# Patient Record
Sex: FEMALE | Race: WHITE | Employment: OTHER | URBAN - METROPOLITAN AREA
[De-identification: names, ages, dates, MRNs, and addresses within clinical notes are randomized per-mention and may not be internally consistent; named-entity substitution may affect disease eponyms.]

---

## 2017-01-23 ENCOUNTER — TELEPHONE (OUTPATIENT)
Dept: INTERNAL MEDICINE CLINIC | Age: 77
End: 2017-01-23

## 2017-01-23 NOTE — TELEPHONE ENCOUNTER
Patient would like to discuss her blood sugars with . For the past 3-4 weeks she has noticed that her fasting blood sugar has been ranging from 165-212. She is currently taking glipizide and metformin. She doesn't feel that they are working. She wants to know if the dosage needs to change or does she need to try a new medication. Also she has tendonitis in both of her wrists and had cortisone shots in both wrists. She isn't sure if this could have anything to do with her elevated blood sugar. Also she is taking cymbalta and noticed that one of the side effects of taking it was problems with blood sugar. She would like to stop the cymbalta.  She would like a call back at [phone]

## 2017-01-24 NOTE — TELEPHONE ENCOUNTER
Spoke to pt - she states she would like to speak with Dr. Isaías Aguilera regarding her recent concerns with her glucose, her cortisone injections, and stopping her Cymbalta. Advised pt that it would be best if she come in for an office visit to further address her concerns. Pt is scheduled for Jan 26th @ 11am. Requested that she bring her recent blood sugar readings with her. Ms. La Nena Ornelas acknowledged understanding and all questions were answered to patients satisfaction. No further questions or concerns at this time.

## 2017-01-26 ENCOUNTER — OFFICE VISIT (OUTPATIENT)
Dept: INTERNAL MEDICINE CLINIC | Age: 77
End: 2017-01-26

## 2017-01-26 ENCOUNTER — HOSPITAL ENCOUNTER (OUTPATIENT)
Dept: LAB | Age: 77
Discharge: HOME | End: 2017-01-26
Payer: MEDICARE

## 2017-01-26 VITALS
WEIGHT: 172.9 LBS | HEIGHT: 59 IN | SYSTOLIC BLOOD PRESSURE: 130 MMHG | HEART RATE: 86 BPM | OXYGEN SATURATION: 97 % | BODY MASS INDEX: 34.86 KG/M2 | TEMPERATURE: 99.3 F | DIASTOLIC BLOOD PRESSURE: 80 MMHG | RESPIRATION RATE: 18 BRPM

## 2017-01-26 DIAGNOSIS — M15.9 OSTEOARTHRITIS INVOLVING MULTIPLE JOINTS ON BOTH SIDES OF BODY: ICD-10-CM

## 2017-01-26 DIAGNOSIS — E11.9 CONTROLLED TYPE 2 DIABETES MELLITUS WITHOUT COMPLICATION, WITHOUT LONG-TERM CURRENT USE OF INSULIN (HCC): Primary | ICD-10-CM

## 2017-01-26 DIAGNOSIS — Z23 NEED FOR PNEUMOCOCCAL VACCINATION: ICD-10-CM

## 2017-01-26 LAB
ALBUMIN UR QL STRIP: 30 MG/L
CREATININE, URINE POC: 200 MG/DL
MICROALBUMIN/CREAT RATIO POC: <30 MG/G

## 2017-01-26 PROCEDURE — 36415 COLL VENOUS BLD VENIPUNCTURE: CPT

## 2017-01-26 PROCEDURE — 80053 COMPREHEN METABOLIC PANEL: CPT

## 2017-01-26 PROCEDURE — 83036 HEMOGLOBIN GLYCOSYLATED A1C: CPT

## 2017-01-26 PROCEDURE — 85027 COMPLETE CBC AUTOMATED: CPT

## 2017-01-26 RX ORDER — AMLODIPINE BESYLATE 2.5 MG/1
2.5 TABLET ORAL DAILY
COMMUNITY
End: 2018-09-12 | Stop reason: DRUGHIGH

## 2017-01-26 RX ORDER — GLIPIZIDE 5 MG/1
5 TABLET ORAL 2 TIMES DAILY
Qty: 180 TAB | Refills: 1 | Status: SHIPPED | OUTPATIENT
Start: 2017-01-26 | End: 2017-02-15 | Stop reason: SDUPTHER

## 2017-01-26 RX ORDER — METFORMIN HYDROCHLORIDE 500 MG/1
500 TABLET ORAL 2 TIMES DAILY WITH MEALS
Qty: 180 TAB | Refills: 1 | Status: SHIPPED | OUTPATIENT
Start: 2017-01-26 | End: 2017-07-28 | Stop reason: SDUPTHER

## 2017-01-26 NOTE — PROGRESS NOTES
HPI:  Narayan Gonzalez is a 64y.o. year old female who returns to clinic today for an acute visit to discuss the problem(s) below:    She is here to discuss her diabetes. Moved her appt up early in order due to elevated blood sugar readings. She is having a lot of pain in her forearms due to tendonitis and had cortisone injections bilaterally about 6 weeks ago. Otherwise she reports no change in diet. She does [employer] for exercise. AM fasting:  Past week 165-212. Has not had hypoglycemia. Last A1c was 6.9 % on 10/25    Can't tolerate a higher metformin dose due to GI side effects. Sotalol was stopped by her cardiologist, there has been some improvement in fatigue. On 11/16 I started her on Cymbalta due to chronic msk pain and anxiety (she denies feeling anxious but was on lorazpem by Dr. Kei Alexander). She reports no improvement in pain level and feels her mood is ok aside from being worried about her health. She would overall like to be on less medication and requests to stop this. Prior to Admission medications    Medication Sig Start Date End Date Taking? Authorizing Provider   amLODIPine (NORVASC) 2.5 mg tablet Take  by mouth daily. Pt unsure of dosage   Yes Historical Provider   DULoxetine (CYMBALTA) 20 mg capsule Take 1 Cap by mouth daily. Indications: CHRONIC MUSCULOSKELETAL PAIN 11/16/16  Yes Olga Lidia Faust MD   glucose blood VI test strips (ONETOUCH ULTRA TEST) strip TEST BLOOD SUGAR ONCE DAILY DX Code: E11.9 11/16/16  Yes Olga Lidia Faust MD   metFORMIN (GLUCOPHAGE) 500 mg tablet Take 1 Tab by mouth two (2) times daily (with meals). 7/20/16  Yes Olga Lidia Faust MD   glipiZIDE (GLUCOTROL) 5 mg tablet TAKE ONE-HALF TABLET BY MOUTH TWICE A DAY WITH FOOD 5/25/16  Yes Olga Lidia Faust MD   cyanocobalamin (VITAMIN B-12) 500 mcg tablet Take 500 mcg by mouth daily. Yes Historical Provider   finasteride (PROSCAR) 5 mg tablet Take 5 mg by mouth daily.    Yes Historical Provider   biotin 2,500 mcg tab Take 5,000 Units by mouth. Yes Historical Provider   Cholecalciferol, Vitamin D3, (VITAMIN D3) 1,000 unit cap Take  by mouth. Yes Historical Provider   MULTIVITAMIN PO Take  by mouth. Yes Historical Provider   NAPROXEN SODIUM (ALEVE PO) Take  by mouth daily. Yes Historical Provider   aspirin delayed-release 81 mg tablet Take  by mouth daily. Every other day   Yes Historical Provider   LORazepam (ATIVAN) 0.5 mg tablet Take 0.5 mg by mouth daily as needed. Yes Historical Provider   diclofenac (VOLTAREN) 1 % gel APPLY 2GM TO AFFECTED AREA FOUR TIMES A DAY 8/5/16   Historical Provider   traMADol (ULTRAM) 50 mg tablet TAKE ONE TABLET BY MOUTH EVERY 6 HOURS AS NEEDED FOR PAIN 10/5/16   Historical Provider          Allergies   Allergen Reactions    Januvia [Sitagliptin] Rash    B12 [Cyanocobalamin-Cobamamide] Hives     w/injection    Decongestant [Brompheniramine Maleate] Other (comments)     heart           Review of Systems   Constitutional: Positive for malaise/fatigue. Negative for weight loss. Eyes: Negative for blurred vision. Respiratory: Negative for shortness of breath. Cardiovascular: Negative for chest pain and palpitations. She has pain in her anterior neck with activity   Gastrointestinal: Negative for abdominal pain, constipation and diarrhea. Genitourinary: Negative for frequency. Neurological: Negative for tingling. Endo/Heme/Allergies: Negative for polydipsia. Physical Exam   Constitutional: She appears well-nourished. Obese   Neck: Carotid bruit is not present. Cardiovascular: Normal rate, regular rhythm and normal heart sounds. No murmur heard. Pulses:       Carotid pulses are 2+ on the right side, and 2+ on the left side. Pulmonary/Chest: Effort normal and breath sounds normal.   Abdominal: Soft. Bowel sounds are normal. There is no hepatosplenomegaly. There is no tenderness. Musculoskeletal: She exhibits no edema.          Visit Vitals  /80    Pulse 86    Temp 99.3 °F (37.4 °C) (Oral)    Resp 18    Ht 4' 11\" (1.499 m)    Wt 172 lb 14.4 oz (78.4 kg)    SpO2 97%    BMI 34.92 kg/m2         Assessment & Plan:  Lesly Escalante was seen today for medication evaluation and diabetes. Diagnoses and all orders for this visit:    Controlled type 2 diabetes mellitus without complication, without long-term current use of insulin ([address]  Most recent blood sugars elevated without clear etiology - likely some worsening due to her inflammatory condition in her wrists and the need for steroid injections. I'm not sure how well she will do with introduction of new medicines, so prefer to work with what she is on. Will continue Metformin at present dose (can't tolerate higher dose)  Start with increase in morning glipizide to 5 mg, keep evening glipizide at 2.5 mg for now  If am glucose remains > 180 after 5 days increase evening glipizide to 5  We discussed the potential for hypoglycemia with glipizide so this will need to be carefully monitored. Check diabetic labs today    -     metFORMIN (GLUCOPHAGE) 500 mg tablet; Take 1 Tab by mouth two (2) times daily (with meals). -     glipiZIDE (GLUCOTROL) 5 mg tablet; Take 1 Tab by mouth two (2) times a day. 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 Need for pneumococcal vaccination  -     ADMIN PNEUMOCOCCAL VACCINE  -     PNEUMOCOCCAL POLYSACCHARIDE VACCINE, 23-VALENT, ADULT OR IMMUNOSUPPRESSED PT DOSE,         Follow-up Disposition:  Return for Keep Feb appt. Advised her to call back or return to office if symptoms worsen/change/persist.  Discussed expected course/resolution/complications of diagnosis in detail with patient.     Medication risks/benefits/costs/interactions/alternatives discussed with patient. She was given an after visit summary which includes diagnoses, current medications, & vitals. She expressed understanding with the diagnosis and plan.

## 2017-01-26 NOTE — MR AVS SNAPSHOT
Visit Information Date & Time Provider Department Dept. Phone Encounter #  
 1/26/2017 11:00 AM MD [facility] [phone] [id number] Follow-up Instructions Return for Keep Feb appt. Your Appointments 2/15/2017  1:40 PM  
ROUTINE CARE with MD [facility] ([facility]) Appt Note: 3m diabetic fu  
 [address] , [place] [address] [facility] [address]  
[address] [facility] [address]  
  
    
 9/25/2017 11:40 AM  
Any with Sydnee Montoya MD  
[address] [place] [address] ([facility]) Appt Note: YRLY CPAP FOLLOW UP  
 [address] [facility] [address] [facility] [address]  
  
   
 [address] [facility] [address] [facility] [id number] Upcoming Health Maintenance Date Due  
 EYE EXAM RETINAL OR DILATED Q1 4/21/2016 MEDICARE YEARLY EXAM 6/5/2016 Pneumococcal 65+ Low/Medium Risk (2 of 2 - PPSV23) 10/31/2016 MICROALBUMIN Q1 1/28/2017 GLAUCOMA SCREENING Q2Y 4/21/2017 HEMOGLOBIN A1C Q6M 4/25/2017 LIPID PANEL Q1 7/21/2017 FOOT EXAM Q1 11/16/2017 DTaP/Tdap/Td series (2 - Td) 9/14/2025 Allergies as of 1/26/2017  Review Complete On: 1/26/2017 By: Iman Barnett, LPN Severity Noted Reaction Type Reactions Januvia [Sitagliptin] Medium 07/23/2014   Side Effect Rash  
 B12 [Cyanocobalamin-cobamamide]  12/19/2011    Hives  
 w/injection Decongestant [Brompheniramine Maleate]  12/19/2011    Other (comments)  
 heart Current Immunizations  Reviewed on 1/26/2017 Name Date Influenza High Dose Vaccine PF 10/15/2016, 10/31/2015 Influenza Vaccine 10/15/2014, 10/16/2013 Influenza Vaccine Whole 10/8/2010 Pneumococcal Conjugate (PCV-13) 10/31/2015 Pneumococcal Polysaccharide (PPSV-23)  Incomplete Tdap 9/14/2015 Zoster 12/1/2009 Reviewed by [id number], MD on 1/26/2017 at 12:15 PM  
 Reviewed by [id number], MD on 1/26/2017 at 12:15 PM  
 Reviewed by [id number], MD on 1/26/2017 at 12:16 PM  
You Were Diagnosed With   
  
 Codes Comments Controlled type 2 diabetes mellitus without complication, without long-term current use of insulin ([facility]    -  Primary ICD-10-CM: E11.9 ICD-9-CM: 250.00 Need for pneumococcal vaccination     ICD-10-CM: N40 ICD-9-CM: V03.82 Vitals BP Pulse Temp Resp Height(growth percentile) Weight(growth percentile) 130/80 86 99.3 °F (37.4 °C) (Oral) 18 4' 11\" (1.499 m) 172 lb 14.4 oz (78.4 kg) SpO2 BMI OB Status Smoking Status 97% 34.92 kg/m2 Postmenopausal Never Smoker Vitals History BMI and BSA Data Body Mass Index Body Surface Area 34.92 kg/m 2 1.81 m 2 Preferred Pharmacy Pharmacy Name Phone [facility] [address] [phone] Your Updated Medication List  
  
   
This list is accurate as of: 1/26/17 12:25 PM.  Always use your most recent med list.  
  
  
  
  
 Ottis Sharath Take  by mouth daily. amLODIPine 2.5 mg tablet Commonly known as:  Tad Kaiden Take  by mouth daily. Pt unsure of dosage  
  
 aspirin delayed-release 81 mg tablet Take  by mouth daily. Every other day  
  
 biotin 2,500 mcg Tab Take 5,000 Units by mouth. diclofenac 1 % Gel Commonly known as:  VOLTAREN  
APPLY 2GM TO AFFECTED AREA FOUR TIMES A DAY DULoxetine 20 mg capsule Commonly known as:  CYMBALTA Take 1 Cap by mouth daily. Indications: CHRONIC MUSCULOSKELETAL PAIN  
  
 finasteride 5 mg tablet Commonly known as:  PROSCAR Take 5 mg by mouth daily. glipiZIDE 5 mg tablet Commonly known as:  Riri Joanna Take 1 Tab by mouth two (2) times a day. glucose blood VI test strips strip Commonly known as:  ONETOUCH ULTRA TEST  
TEST BLOOD SUGAR ONCE DAILY DX Code: E11.9 LORazepam 0.5 mg tablet Commonly known as:  ATIVAN Take 0.5 mg by mouth daily as needed. metFORMIN 500 mg tablet Commonly known as:  GLUCOPHAGE Take 1 Tab by mouth two (2) times daily (with meals). MULTIVITAMIN PO Take  by mouth. traMADol 50 mg tablet Commonly known as:  ULTRAM  
TAKE ONE TABLET BY MOUTH EVERY 6 HOURS AS NEEDED FOR PAIN  
  
 VITAMIN B-12 500 mcg tablet Generic drug:  cyanocobalamin Take 500 mcg by mouth daily. VITAMIN D3 1,000 unit Cap Generic drug:  cholecalciferol Take  by mouth. Prescriptions Sent to Pharmacy Refills  
 metFORMIN (GLUCOPHAGE) 500 mg tablet 1 Sig: Take 1 Tab by mouth two (2) times daily (with meals). Class: Normal  
 Pharmacy: [facility] [address] [facility] Ph #: [phone] Route: Oral  
 glipiZIDE (GLUCOTROL) 5 mg tablet 1 Sig: Take 1 Tab by mouth two (2) times a day. Class: Normal  
 Pharmacy: [facility]. [address] [facility] Ph #: [phone] Route: Oral  
  
We Performed the Following ADMIN PNEUMOCOCCAL VACCINE [ HCPCS] AMB POC URINE, MICROALBUMIN, SEMIQUANT (3 RESULTS) [10377 CPT(R)] CBC W/O DIFF [17805 CPT(R)] HEMOGLOBIN A1C WITH EAG [94521 CPT(R)] METABOLIC PANEL, COMPREHENSIVE [91103 CPT(R)] PNEUMOCOCCAL POLYSACCHARIDE VACCINE, 23-VALENT, ADULT OR IMMUNOSUPPRESSED PT DOSE, [79113 CPT(R)] Follow-up Instructions Return for Keep Feb appt. Patient Instructions Pneumococcal Polysaccharide Vaccine: What You Need to Know Pneumococcal disease Pneumococcal disease is caused by Streptococcus pneumoniae bacteria. It is a leading cause of vaccine-preventable illness and death in the [place]. Anyone can get pneumococcal disease, but some people are at greater risk than others: 
· People 65 years and older · The very young · People with certain health problems · People with a weakened immune system · Smokers Pneumococcal disease can lead to serious infections of the: 
· Lungs (pneumonia). · Blood (bacteremia). · Covering of the brain (meningitis). Pneumococcal pneumonia kills about 1 out of 20 people who get it. Bacteremia kills about 1 person in 5, and meningitis about 3 people in 10. People with the health problems described in Section 3 of this statement may be more likely to die from the disease. Pneumococcal polysaccharide vaccine (PPSV) Treatment of pneumococcal infections with penicillin and other drugs used to be more effective. But some strains of the disease have become resistant to these drugs. This makes prevention of the disease, through vaccination, even more important. Pneumococcal polysaccharide vaccine (PPSV) protects against 23 types of pneumococcal bacteria, including those most likely to cause serious disease. Most healthy adults who get the vaccine develop protection to most or all of these types within 2 to 3 weeks of getting the shot. Very old people, children under 3years of age, and people with some long-term illnesses might not respond as well, or at all. Another type of pneumococcal vaccine (pneumococcal conjugate vaccine, or PCV) is routinely recommended for children younger than 11years of age. PCV is described in a separate Vaccine Information Statement. Who should get PPSV? · All adults 72years of age and older. · Anyone 2 through 59years of age who has a long-term health problem such as: 
¨ Heart disease. ¨ Lung disease. ¨ Sickle cell disease. ¨ Diabetes. ¨ Alcoholism. ¨ Cirrhosis. ¨ Leaks of cerebrospinal fluid or cochlear implant. · Anyone 2 through 59years of age who has a disease or condition that lowers the body's resistance to infection, such as: 
¨ Hodgkin's disease. ¨ Lymphoma or leukemia. ¨ Kidney failure. ¨ Multiple myeloma. ¨ Nephrotic syndrome. ¨ HIV infection or AIDS. ¨ Damaged spleen, or no spleen. ¨ Organ transplant. · Anyone 2 through 59years of age who is taking a drug or treatment that lowers the body's resistance to infection, such as: 
¨ Long-term steroids. ¨ Certain cancer drugs. ¨ Radiation therapy. · Any adult 23 through 59years of age who: ¨ Is a smoker. ¨ Has asthma. PPSV may be less effective for some people, especially those with lower resistance to infection. But these people should still be vaccinated, because they are more likely to have serious complications if they get pneumococcal disease. Children who often get ear infections, sinus infections, or other upper respiratory diseases, but who are otherwise healthy, do not need to get PPSV because it is not effective against those conditions. How many doses of PPSV are needed, and when? Usually only one dose of PPSV is needed, but under some circumstances a second dose may be given. · A second dose is recommended for people 65 years and older who got their first dose when they were younger than 72 and it has been 5 or more years since the first dose. · A second dose is recommended for people 2 through 59years of age who: 
¨ Have a damaged spleen or no spleen. ¨ Have sickle-cell disease. ¨ Have HIV infection or AIDS. ¨ Have cancer, leukemia, lymphoma, multiple myeloma. ¨ Have nephrotic syndrome. ¨ Have had an organ or bone marrow transplant. ¨ Are taking medication that lowers immunity (such as chemotherapy or long-term steroids). When a second dose is given, it should be given 5 years after the first dose. Some people should not get PPSV or should wait · Anyone who has had a life-threatening allergic reaction to PPSV should not get another dose. · Anyone who has a severe allergy to any component of a vaccine should not get that vaccine. Tell your doctor if you have any severe allergies.  
· Anyone who is moderately or severely ill when the shot is scheduled may be asked to wait until they recover before getting the vaccine. Someone with a mild illness can usually be vaccinated. · While there is no evidence that PPSV is harmful to either a pregnant woman or to her fetus, as a precaution, women with conditions that put them at risk for pneumococcal disease should be vaccinated before becoming pregnant, if possible. What are the risks from PPSV? About half of people who get PPSV have mild side effects, such as redness or pain where the shot is given. Less than 1% develop a fever, muscle aches, or more severe local reactions. A vaccine, like any medicine, could cause a serious reaction. But the risk of a vaccine causing serious harm, or death, is extremely small. What if there is a serious reaction? What should I look for? · Look for anything that concerns you, such as signs of a severe allergic reaction, very high fever, or behavior changes. Signs of a severe allergic reaction can include hives, swelling of the face and throat, difficulty breathing, a fast heartbeat, dizziness, and weakness. These would start a few minutes to a few hours after the vaccination. What should I do? · If you think it is a severe allergic reaction or other emergency that can't wait, call 9-1-1 or get the person to the nearest hospital. Otherwise, call your doctor. · Afterward, the reaction should be reported to the Vaccine Adverse Event Reporting System (VAERS). Your doctor might file this report, or you can do it yourself through the VAERS web site at www.vaers. hhs.gov, or by calling 1-969.251.4608. VAERS is only for reporting reactions. They do not give medical advice. How can I learn more? · Ask your doctor. · Call your local or state health department. · Contact the Centers for Disease Control and Prevention (CDC): 
¨ Call 9-681.772.4170 (5-402-EFX-INFO) or ¨ Visit CDC's website at www.cdc.gov/vaccines Vaccine Information Statement PPSV Vaccine 
(10/06/2009) Department of Health and [employer] Centers for Disease Control and Prevention Many Vaccine Information Statements are available in [place] and other languages. See www.immunize.org/vis. Muchas hojas de información sobre vacunas están disponibles en español y en otros idiomas. Visite www.immunize.org/vis. Care instructions adapted under license by your healthcare professional. If you have questions about a medical condition or this instruction, always ask your healthcare professional. [address] any warranty or liability for your use of this information. Introducing [facility]! Dear Lacey De Leon: Thank you for requesting a [employer] account. Our records indicate that you already have an active [employer] account. You can access your account anytime at [email or web address] Did you know that you can access your hospital and ER discharge instructions at any time in [employer]? You can also review all of your test results from your hospital stay or ER visit. Additional Information If you have questions, please visit the Frequently Asked Questions section of the [employer] website at [email or web address] Remember, [employer] is NOT to be used for urgent needs. For medical emergencies, dial 911. Now available from your iPhone and Android! Please provide this summary of care documentation to your next provider. Your primary care clinician is listed as [address] If you have any questions after today's visit, please call [phone].

## 2017-01-26 NOTE — PATIENT INSTRUCTIONS
Pneumococcal Polysaccharide Vaccine: What You Need to Know  Pneumococcal disease  Pneumococcal disease is caused by Streptococcus pneumoniae bacteria. It is a leading cause of vaccine-preventable illness and death in the [place]. Anyone can get pneumococcal disease, but some people are at greater risk than others:  · People 65 years and older  · The very young  · People with certain health problems  · People with a weakened immune system  · Smokers  Pneumococcal disease can lead to serious infections of the:  · Lungs (pneumonia). · Blood (bacteremia). · Covering of the brain (meningitis). Pneumococcal pneumonia kills about 1 out of 20 people who get it. Bacteremia kills about 1 person in 5, and meningitis about 3 people in 10. People with the health problems described in Section 3 of this statement may be more likely to die from the disease. Pneumococcal polysaccharide vaccine (PPSV)  Treatment of pneumococcal infections with penicillin and other drugs used to be more effective. But some strains of the disease have become resistant to these drugs. This makes prevention of the disease, through vaccination, even more important. Pneumococcal polysaccharide vaccine (PPSV) protects against 23 types of pneumococcal bacteria, including those most likely to cause serious disease. Most healthy adults who get the vaccine develop protection to most or all of these types within 2 to 3 weeks of getting the shot. Very old people, children under 3years of age, and people with some long-term illnesses might not respond as well, or at all. Another type of pneumococcal vaccine (pneumococcal conjugate vaccine, or PCV) is routinely recommended for children younger than 11years of age. PCV is described in a separate Vaccine Information Statement. Who should get PPSV? · All adults 72years of age and older. · Anyone 2 through 59years of age who has a long-term health problem such as:  ¨ Heart disease.   ¨ Lung disease. ¨ Sickle cell disease. ¨ Diabetes. ¨ Alcoholism. ¨ Cirrhosis. ¨ Leaks of cerebrospinal fluid or cochlear implant. · Anyone 2 through 59years of age who has a disease or condition that lowers the body's resistance to infection, such as:  ¨ Hodgkin's disease. ¨ Lymphoma or leukemia. ¨ Kidney failure. ¨ Multiple myeloma. ¨ Nephrotic syndrome. ¨ HIV infection or AIDS. ¨ Damaged spleen, or no spleen. ¨ Organ transplant. · Anyone 2 through 59years of age who is taking a drug or treatment that lowers the body's resistance to infection, such as:  ¨ Long-term steroids. ¨ Certain cancer drugs. ¨ Radiation therapy. · Any adult 23 through 59years of age who:  ¨ Is a smoker. ¨ Has asthma. PPSV may be less effective for some people, especially those with lower resistance to infection. But these people should still be vaccinated, because they are more likely to have serious complications if they get pneumococcal disease. Children who often get ear infections, sinus infections, or other upper respiratory diseases, but who are otherwise healthy, do not need to get PPSV because it is not effective against those conditions. How many doses of PPSV are needed, and when? Usually only one dose of PPSV is needed, but under some circumstances a second dose may be given. · A second dose is recommended for people 65 years and older who got their first dose when they were younger than 72 and it has been 5 or more years since the first dose. · A second dose is recommended for people 2 through 59years of age who:  ¨ Have a damaged spleen or no spleen. ¨ Have sickle-cell disease. ¨ Have HIV infection or AIDS. ¨ Have cancer, leukemia, lymphoma, multiple myeloma. ¨ Have nephrotic syndrome. ¨ Have had an organ or bone marrow transplant. ¨ Are taking medication that lowers immunity (such as chemotherapy or long-term steroids).   When a second dose is given, it should be given 5 years after the first dose.  Some people should not get PPSV or should wait  · Anyone who has had a life-threatening allergic reaction to PPSV should not get another dose. · Anyone who has a severe allergy to any component of a vaccine should not get that vaccine. Tell your doctor if you have any severe allergies. · Anyone who is moderately or severely ill when the shot is scheduled may be asked to wait until they recover before getting the vaccine. Someone with a mild illness can usually be vaccinated. · While there is no evidence that PPSV is harmful to either a pregnant woman or to her fetus, as a precaution, women with conditions that put them at risk for pneumococcal disease should be vaccinated before becoming pregnant, if possible. What are the risks from PPSV? About half of people who get PPSV have mild side effects, such as redness or pain where the shot is given. Less than 1% develop a fever, muscle aches, or more severe local reactions. A vaccine, like any medicine, could cause a serious reaction. But the risk of a vaccine causing serious harm, or death, is extremely small. What if there is a serious reaction? What should I look for? · Look for anything that concerns you, such as signs of a severe allergic reaction, very high fever, or behavior changes. Signs of a severe allergic reaction can include hives, swelling of the face and throat, difficulty breathing, a fast heartbeat, dizziness, and weakness. These would start a few minutes to a few hours after the vaccination. What should I do? · If you think it is a severe allergic reaction or other emergency that can't wait, call 9-1-1 or get the person to the nearest hospital. Otherwise, call your doctor. · Afterward, the reaction should be reported to the Vaccine Adverse Event Reporting System (VAERS). Your doctor might file this report, or you can do it yourself through the VAERS web site at www.vaers. hhs.gov, or by calling 1-202.206.3150.   [employer] is only for reporting reactions. They do not give medical advice. How can I learn more? · Ask your doctor. · Call your local or state health department. · Contact the Centers for Disease Control and Prevention (CDC):  ¨ Call 7-974.944.9851 (1-800-CDC-INFO) or  ¨ Visit CDC's website at www.cdc.gov/vaccines  Vaccine Information Statement  PPSV Vaccine  (10/06/2009)  Department of Health and Human Services  Centers for Disease Control and Prevention  Many Vaccine Information Statements are available in [place] and other languages. See www.immunize.org/vis. Muchas hojas de información sobre vacunas están disponibles en español y en otros idiomas. Visite www.immunize.org/vis. Care instructions adapted under license by your healthcare professional. If you have questions about a medical condition or this instruction, always ask your healthcare professional. [address] any warranty or liability for your use of this information.

## 2017-01-26 NOTE — PROGRESS NOTES
Tamara Mcintyre is a 64 y.o. female  Chief Complaint   Patient presents with    Medication Evaluation    Diabetes     1. Have you been to the ER, urgent care clinic since your last visit? Hospitalized since your last visit? No    2. Have you seen or consulted any other health care providers outside of the [address] [place] [address] since your last visit? Include any pap smears or colon screening.   No

## 2017-01-27 LAB
ALBUMIN SERPL-MCNC: 4.3 G/DL (ref 3.5–4.8)
ALBUMIN/GLOB SERPL: 1.9 {RATIO} (ref 1.1–2.5)
ALP SERPL-CCNC: 87 IU/L (ref 39–117)
ALT SERPL-CCNC: 29 IU/L (ref 0–32)
AST SERPL-CCNC: 30 IU/L (ref 0–40)
BILIRUB SERPL-MCNC: 0.3 MG/DL (ref 0–1.2)
BUN SERPL-MCNC: 12 MG/DL (ref 8–27)
BUN/CREAT SERPL: 24 (ref 11–26)
CALCIUM SERPL-MCNC: 9.5 MG/DL (ref 8.7–10.3)
CHLORIDE SERPL-SCNC: 100 MMOL/L (ref 96–106)
CO2 SERPL-SCNC: 23 MMOL/L (ref 18–29)
CREAT SERPL-MCNC: 0.5 MG/DL (ref 0.57–1)
ERYTHROCYTE [DISTWIDTH] IN BLOOD BY AUTOMATED COUNT: 14.7 % (ref 12.3–15.4)
EST. AVERAGE GLUCOSE BLD GHB EST-MCNC: 163 MG/DL
GLOBULIN SER CALC-MCNC: 2.3 G/DL (ref 1.5–4.5)
GLUCOSE SERPL-MCNC: 171 MG/DL (ref 65–99)
HBA1C MFR BLD: 7.3 % (ref 4.8–5.6)
HCT VFR BLD AUTO: 37.1 % (ref 34–46.6)
HGB BLD-MCNC: 12.5 G/DL (ref 11.1–15.9)
MCH RBC QN AUTO: 29.8 PG (ref 26.6–33)
MCHC RBC AUTO-ENTMCNC: 33.7 G/DL (ref 31.5–35.7)
MCV RBC AUTO: 88 FL (ref 79–97)
PLATELET # BLD AUTO: 209 X10E3/UL (ref 150–379)
POTASSIUM SERPL-SCNC: 4.1 MMOL/L (ref 3.5–5.2)
PROT SERPL-MCNC: 6.6 G/DL (ref 6–8.5)
RBC # BLD AUTO: 4.2 X10E6/UL (ref 3.77–5.28)
SODIUM SERPL-SCNC: 141 MMOL/L (ref 134–144)
WBC # BLD AUTO: 8.1 X10E3/UL (ref 3.4–10.8)

## 2017-01-30 NOTE — PROGRESS NOTES
The following [employer] message was sent to patient:    As suspected your HgA1c has risen. Let's see how you do with the higher dose of glipizide before considering a third medicine. All of your other labs look good. I'll see you in April.

## 2017-02-15 ENCOUNTER — OFFICE VISIT (OUTPATIENT)
Dept: INTERNAL MEDICINE CLINIC | Age: 77
End: 2017-02-15

## 2017-02-15 VITALS
SYSTOLIC BLOOD PRESSURE: 130 MMHG | OXYGEN SATURATION: 99 % | BODY MASS INDEX: 34.47 KG/M2 | HEIGHT: 59 IN | TEMPERATURE: 98 F | WEIGHT: 171 LBS | DIASTOLIC BLOOD PRESSURE: 70 MMHG | HEART RATE: 65 BPM | RESPIRATION RATE: 16 BRPM

## 2017-02-15 DIAGNOSIS — M25.473 ANKLE SWELLING, UNSPECIFIED LATERALITY: ICD-10-CM

## 2017-02-15 RX ORDER — GLIPIZIDE 5 MG/1
TABLET ORAL
Qty: 225 TAB | Refills: 1 | Status: SHIPPED | OUTPATIENT
Start: 2017-02-15 | End: 2017-12-15 | Stop reason: SDUPTHER

## 2017-02-15 NOTE — PROGRESS NOTES
HPI:  Mery Benitez is a 64y.o. year old female who returns to clinic today for routine follow up appointment to discuss the issues below:    She is here early for diabetic follow up. Last visit was on  with A1c on  7.3 %. Since last visit glipizide increased to 5 mg bid, adherent also to Metformin  Blood glucose log reviewed:  Fastin-214 fasting  Evenin and 290 on recent checks. The higher blood sugar levels come after years of stability, no weight gain and no dietary changes. She does [employer] for exercise. She notes increase in ankle swelling recently. Dr. Khanh Cunningham started her on amlodipine in Dec.        Prior to Admission medications    Medication Sig Start Date End Date Taking? Authorizing Provider   amLODIPine (NORVASC) 2.5 mg tablet Take  by mouth daily. Pt unsure of dosage   Yes Historical Provider   metFORMIN (GLUCOPHAGE) 500 mg tablet Take 1 Tab by mouth two (2) times daily (with meals). 17  Yes [address], [place]   glipiZIDE (GLUCOTROL) 5 mg tablet Take 1 Tab by mouth two (2) times a day. 17  Yes [address], [place]   diclofenac (VOLTAREN) 1 % gel APPLY 2GM TO AFFECTED AREA FOUR TIMES A DAY 16  Yes Historical Provider   cyanocobalamin (VITAMIN B-12) 500 mcg tablet Take 500 mcg by mouth daily. Yes Historical Provider   finasteride (PROSCAR) 5 mg tablet Take 5 mg by mouth daily. Yes Historical Provider   biotin 2,500 mcg tab Take 5,000 Units by mouth. Yes Historical Provider   Cholecalciferol, Vitamin D3, (VITAMIN D3) 1,000 unit cap Take  by mouth. Yes Historical Provider   MULTIVITAMIN PO Take  by mouth. Yes Historical Provider   NAPROXEN SODIUM (ALEVE PO) Take  by mouth daily. Yes Historical Provider   aspirin delayed-release 81 mg tablet Take  by mouth daily. Every other day   Yes Historical Provider   LORazepam (ATIVAN) 0.5 mg tablet Take 0.5 mg by mouth daily as needed.    Yes Historical Provider   glucose blood VI test strips (ONETOUCH ULTRA TEST) strip TEST BLOOD SUGAR ONCE DAILY DX Code: E11.9 11/16/16   Daphne Trujillo MD          Allergies   Allergen Reactions    Januvia [Sitagliptin] Rash    B12 [Cyanocobalamin-Cobamamide] Hives     w/injection    Decongestant [Brompheniramine Maleate] Other (comments)     heart           Review of Systems   Constitutional: Negative for malaise/fatigue and weight loss. Eyes: Negative for blurred vision. Respiratory: Negative for shortness of breath. Cardiovascular: Negative for chest pain and palpitations. Gastrointestinal: Negative for abdominal pain, constipation and diarrhea. Genitourinary: Negative for frequency. Neurological: Negative for tingling. Endo/Heme/Allergies: Negative for polydipsia. Physical Exam   Constitutional: She appears well-nourished. Obese   Neck: Carotid bruit is not present. Cardiovascular: Normal rate, regular rhythm and normal heart sounds. No murmur heard. Pulses:       Carotid pulses are 2+ on the right side, and 2+ on the left side. Pulmonary/Chest: Effort normal and breath sounds normal.   Abdominal: Soft. Bowel sounds are normal. There is no hepatosplenomegaly. There is no tenderness. Musculoskeletal: She exhibits edema (trace non pitting ankle edema). Visit Vitals    /70 (BP 1 Location: Left arm, BP Patient Position: Sitting)    Pulse 65    Temp 98 °F (36.7 °C) (Oral)    Resp 16    Ht 4' 11\" (1.499 m)    Wt 171 lb (77.6 kg)    SpO2 99%    BMI 34.54 kg/m2         Assessment & Plan:  Nasrin Claros was seen today for diabetes. Diagnoses and all orders for this visit:    Uncontrolled type 2 diabetes mellitus without complication, without long-term current use of insulin (HCC)  Poor glucose control. She was intolerant to a higher dose of Metformin, a DPP4 inhibitor and had a poor response to an SGLT2 inhibitor (with high cost)  Increase glipizide to 7.5 mg in the morning and continue 5 mg with dinner.     Continue Metformin at present dose  Discussed potential for hypoglycemia with a glipizide so I recommend twice daily blood glucose monitoring and prn for symptoms  F/u in April as previously scheduled   -     glipiZIDE (GLUCOTROL) 5 mg tablet; Take 1 and half tabs with breakfast and 1 tab with dinner  -     glucose blood VI test strips (ONETOUCH ULTRA TEST) strip; TEST BLOOD SUGAR Twice DAILY DX Code: E11.9    Ankle swelling, unspecified laterality  Possibly worsened by amlodipine initiation. She is on a low dose and I advised her to discuss this at her f/u with Dr. Mitra Smith who started it for concern of small vessel angina. Follow-up Disposition:  Return in about 4 months (around 6/15/2017) for due for visit in April - ok to see Corey De La Cruz. Advised her to call back or return to office if symptoms worsen/change/persist.  Discussed expected course/resolution/complications of diagnosis in detail with patient. Medication risks/benefits/costs/interactions/alternatives discussed with patient. She was given an after visit summary which includes diagnoses, current medications, & vitals. She expressed understanding with the diagnosis and plan.

## 2017-02-15 NOTE — MR AVS SNAPSHOT
Visit Information Date & Time Provider Department Dept. Phone Encounter #  
 2/15/2017  1:40 PM Luis Enrique Marrero MD [place] [id number] [phone] Follow-up Instructions Return in about 4 months (around 6/15/2017) for due for visit in April - ok to see Amelia Frost. Your Appointments 9/25/2017 11:40 AM  
Any with Prakash Josue MD  
[address] [facility] ([facility]) Appt Note: YRLY CPAP FOLLOW UP  
 [address] [facility] [address] [place] [address]  
  
   
 [address] [facility] [address] [facility] [address] Upcoming Health Maintenance Date Due  
 EYE EXAM RETINAL OR DILATED Q1 4/21/2016 MEDICARE YEARLY EXAM 6/5/2016 GLAUCOMA SCREENING Q2Y 4/21/2017 LIPID PANEL Q1 7/21/2017 HEMOGLOBIN A1C Q6M 7/26/2017 FOOT EXAM Q1 11/16/2017 MICROALBUMIN Q1 1/26/2018 DTaP/Tdap/Td series (2 - Td) 9/14/2025 Allergies as of 2/15/2017  Review Complete On: 2/15/2017 By: Laura Wang Severity Noted Reaction Type Reactions Januvia [Sitagliptin] Medium 07/23/2014   Side Effect Rash  
 B12 [Cyanocobalamin-cobamamide]  12/19/2011    Hives  
 w/injection Decongestant [Brompheniramine Maleate]  12/19/2011    Other (comments)  
 heart Current Immunizations  Reviewed on 1/26/2017 Name Date Influenza High Dose Vaccine PF 10/15/2016, 10/31/2015 Influenza Vaccine 10/15/2014, 10/16/2013 Influenza Vaccine Whole 10/8/2010 Pneumococcal Conjugate (PCV-13) 10/31/2015 Pneumococcal Polysaccharide (PPSV-23) 1/26/2017 Tdap 9/14/2015 Zoster 12/1/2009 Not reviewed this visit You Were Diagnosed With   
  
 Codes Comments Uncontrolled type 2 diabetes mellitus without complication, without long-term current use of insulin ([facility]    -  Primary ICD-10-CM: E11.65 ICD-9-CM: 250.02 Vitals BP Pulse Temp Resp Height(growth percentile) Weight(growth percentile) 130/70 (BP 1 Location: Left arm, BP Patient Position: Sitting) 65 98 °F (36.7 °C) (Oral) 16 4' 11\" (1.499 m) 171 lb (77.6 kg) SpO2 BMI OB Status Smoking Status 99% 34.54 kg/m2 Postmenopausal Never Smoker Vitals History BMI and BSA Data Body Mass Index Body Surface Area 34.54 kg/m 2 1.8 m 2 Preferred Pharmacy Pharmacy Name Phone [facility] [address] [place] [address] [phone] Your Updated Medication List  
  
   
This list is accurate as of: 2/15/17  2:38 PM.  Always use your most recent med list.  
  
  
  
  
 Karimi Leavens Take  by mouth daily. amLODIPine 2.5 mg tablet Commonly known as:  Chris Colón Take  by mouth daily. Pt unsure of dosage  
  
 aspirin delayed-release 81 mg tablet Take  by mouth daily. Every other day  
  
 biotin 2,500 mcg Tab Take 5,000 Units by mouth. diclofenac 1 % Gel Commonly known as:  VOLTAREN  
APPLY 2GM TO AFFECTED AREA FOUR TIMES A DAY  
  
 finasteride 5 mg tablet Commonly known as:  PROSCAR Take 5 mg by mouth daily. glipiZIDE 5 mg tablet Commonly known as:  Sharonla Nordmann Take 1 and half tabs with breakfast and 1 tab with dinner  
  
 glucose blood VI test strips strip Commonly known as:  ONETOUCH ULTRA TEST  
TEST BLOOD SUGAR ONCE DAILY DX Code: E11.9 LORazepam 0.5 mg tablet Commonly known as:  ATIVAN Take 0.5 mg by mouth daily as needed. metFORMIN 500 mg tablet Commonly known as:  GLUCOPHAGE Take 1 Tab by mouth two (2) times daily (with meals). MULTIVITAMIN PO Take  by mouth. VITAMIN B-12 500 mcg tablet Generic drug:  cyanocobalamin Take 500 mcg by mouth daily. VITAMIN D3 1,000 unit Cap Generic drug:  cholecalciferol Take  by mouth. Prescriptions Sent to Pharmacy Refills  
 glipiZIDE (GLUCOTROL) 5 mg tablet 1 Sig: Take 1 and half tabs with breakfast and 1 tab with dinner  Class: Normal  
 Pharmacy: [facility] [place], [facility]  #: [phone] Follow-up Instructions Return in about 4 months (around 6/15/2017) for due for visit in April - ok to see Claritza Baltazar. [facility] SERVICES! Dear Sal Gerardo: Thank you for requesting a [employer] account. Our records indicate that you already have an active [employer] account. You can access your account anytime at [email or web address] Did you know that you can access your hospital and ER discharge instructions at any time in [employer]? You can also review all of your test results from your hospital stay or ER visit. Additional Information If you have questions, please visit the Frequently Asked Questions section of the [employer] website at [email or web address] Remember, [employer] is NOT to be used for urgent needs. For medical emergencies, dial 911. Now available from your iPhone and Android! Please provide this summary of care documentation to your next provider. Your primary care clinician is listed as Carrol Heck. If you have any questions after today's visit, please call [phone].

## 2017-02-15 NOTE — PROGRESS NOTES
1. Have you been to the ER, urgent care clinic since your last visit? Hospitalized since your last visit? No    2. Have you seen or consulted any other health care providers outside of the [address] [place] [address] since your last visit? Include any pap smears or colon screening.  No  Chief Complaint   Patient presents with    Diabetes

## 2017-02-16 DIAGNOSIS — E11.9 CONTROLLED TYPE 2 DIABETES MELLITUS WITHOUT COMPLICATION, WITHOUT LONG-TERM CURRENT USE OF INSULIN (HCC): ICD-10-CM

## 2017-02-23 ENCOUNTER — OFFICE VISIT (OUTPATIENT)
Dept: INTERNAL MEDICINE CLINIC | Age: 77
End: 2017-02-23

## 2017-02-23 VITALS
DIASTOLIC BLOOD PRESSURE: 82 MMHG | RESPIRATION RATE: 16 BRPM | HEIGHT: 59 IN | WEIGHT: 167.9 LBS | SYSTOLIC BLOOD PRESSURE: 148 MMHG | OXYGEN SATURATION: 97 % | HEART RATE: 117 BPM | TEMPERATURE: 102.9 F | BODY MASS INDEX: 33.85 KG/M2

## 2017-02-23 DIAGNOSIS — R05.9 COUGH: ICD-10-CM

## 2017-02-23 DIAGNOSIS — R50.9 FEVER, UNSPECIFIED FEVER CAUSE: ICD-10-CM

## 2017-02-23 DIAGNOSIS — J11.1 INFLUENZA: Primary | ICD-10-CM

## 2017-02-23 LAB
QUICKVUE INFLUENZA TEST: POSITIVE
VALID INTERNAL CONTROL?: YES

## 2017-02-23 RX ORDER — FLUTICASONE PROPIONATE 50 MCG
2 SPRAY, SUSPENSION (ML) NASAL DAILY
Qty: 1 BOTTLE | Refills: 0 | Status: SHIPPED | OUTPATIENT
Start: 2017-02-23 | End: 2020-10-14

## 2017-02-23 NOTE — PROGRESS NOTES
HISTORY OF PRESENT ILLNESS  Miguelito Elizabeth is a 64 y.o. female. Visit Vitals    /82 (BP 1 Location: Right arm, BP Patient Position: Sitting)    Pulse (!) 117    Temp (!) 102.9 °F (39.4 °C) (Oral)    Resp 16    Ht 4' 11\" (1.499 m)    Wt 167 lb 14.4 oz (76.2 kg)    SpO2 97%    BMI 33.91 kg/m2       Fever    The history is provided by the patient. This is a new problem. The current episode started 2 days ago. The problem occurs constantly. The problem has been gradually worsening. The maximum temperature noted was 102 - 102.9 F. Associated symptoms include congestion, sore throat and cough (dry). She has tried ibuprofen for the symptoms. The treatment provided mild relief. Cough     Nasal Congestion    Associated symptoms include chills, congestion, sore throat and cough (dry). Review of Systems   Constitutional: Positive for chills, fever and malaise/fatigue. HENT: Positive for congestion and sore throat. Respiratory: Positive for cough (dry). Musculoskeletal: Positive for myalgias. Physical Exam   Constitutional: She is oriented to person, place, and time. She appears well-developed and well-nourished. HENT:   Head: Normocephalic. Right Ear: Hearing, tympanic membrane, external ear and ear canal normal.   Left Ear: Hearing, tympanic membrane, external ear and ear canal normal.   Nose: Mucosal edema and rhinorrhea present. Mouth/Throat: Uvula is midline and mucous membranes are normal. Posterior oropharyngeal erythema present. Neck: Normal range of motion. Neck supple. Cardiovascular: Normal rate, regular rhythm and normal heart sounds. Pulmonary/Chest: Effort normal and breath sounds normal.   Lymphadenopathy:     She has no cervical adenopathy. Neurological: She is alert and oriented to person, place, and time. Skin: Skin is warm and dry. Psychiatric: She has a normal mood and affect.      Results for orders placed or performed in visit on 02/23/17   AMB POC RAPID INFLUENZA TEST   Result Value Ref Range    VALID INTERNAL CONTROL POC Yes     QuickVue Influenza test Positive Negative       ASSESSMENT and PLAN    ICD-10-CM ICD-9-CM    1. Influenza J11.1 487.1    2. Fever, unspecified fever cause R50.9 780.60 AMB POC RAPID INFLUENZA TEST   3.  Cough R05 786.2 AMB POC RAPID INFLUENZA TEST     Orders Placed This Encounter    AMB POC RAPID INFLUENZA TEST    fluticasone (FLONASE) 50 mcg/actuation nasal spray   ibuprofen for fever and body aches per otc directions  Follow-up if symptoms do not improve or worsen in the next week to ten days  Talked about tamiflu-patient declined as she has approached 48 hours of illness

## 2017-02-23 NOTE — PATIENT INSTRUCTIONS
Influenza (Flu): Care Instructions  Your Care Instructions  Influenza (flu) is an infection in the lungs and breathing passages. It is caused by the influenza virus. There are different strains, or types, of the flu virus from year to year. Unlike the common cold, the flu comes on suddenly and the symptoms, such as a cough, congestion, fever, chills, fatigue, aches, and pains, are more severe. These symptoms may last up to 10 days. Although the flu can make you feel very sick, it usually doesn't cause serious health problems. Home treatment is usually all you need for flu symptoms. But your doctor may prescribe antiviral medicine to prevent other health problems, such as pneumonia, from developing. Older people and those who have a long-term health condition, such as lung disease, are most at risk for having pneumonia or other health problems. Follow-up care is a key part of your treatment and safety. Be sure to make and go to all appointments, and call your doctor if you are having problems. Its also a good idea to know your test results and keep a list of the medicines you take. How can you care for yourself at home? · Get plenty of rest.  · Drink plenty of fluids, enough so that your urine is light yellow or clear like water. If you have kidney, heart, or liver disease and have to limit fluids, talk with your doctor before you increase the amount of fluids you drink. · Take an over-the-counter pain medicine if needed, such as acetaminophen (Tylenol), ibuprofen (Advil, Motrin), or naproxen (Aleve), to relieve fever, headache, and muscle aches. Read and follow all instructions on the label. No one younger than 20 should take aspirin. It has been linked to Reye syndrome, a serious illness. · Do not smoke. Smoking can make the flu worse. If you need help quitting, talk to your doctor about stop-smoking programs and medicines. These can increase your chances of quitting for good.   · Breathe moist air from a hot shower or from a sink filled with hot water to help clear a stuffy nose. · Before you use cough and cold medicines, check the label. These medicines may not be safe for young children or for people with certain health problems. · If the skin around your nose and lips becomes sore, put some petroleum jelly on the area. · To ease coughing:  ¨ Drink fluids to soothe a scratchy throat. ¨ Suck on cough drops or plain hard candy. ¨ Take an over-the-counter cough medicine that contains dextromethorphan to help you get some sleep. Read and follow all instructions on the label. ¨ Raise your head at night with an extra pillow. This may help you rest if coughing keeps you awake. · Take any prescribed medicine exactly as directed. Call your doctor if you think you are having a problem with your medicine. To avoid spreading the flu  · Wash your hands regularly, and keep your hands away from your face. · Stay home from school, work, and other public places until you are feeling better and your fever has been gone for at least 24 hours. The fever needs to have gone away on its own without the help of medicine. · Ask people living with you to talk to their doctors about preventing the flu. They may get antiviral medicine to keep from getting the flu from you. · To prevent the flu in the future, get a flu vaccine every fall. Encourage people living with you to get the vaccine. · Cover your mouth when you cough or sneeze. When should you call for help? Call 911 anytime you think you may need emergency care. For example, call if:  · You have severe trouble breathing. Call your doctor now or seek immediate medical care if:  · You have new or worse trouble breathing. · You seem to be getting much sicker. · You feel very sleepy or confused. · You have a new or higher fever. · You get a new rash.   Watch closely for changes in your health, and be sure to contact your doctor if:  · You begin to get better and then get worse. · You are not getting better after 1 week. Where can you learn more? Go to http://idania-skip.info/. Enter C642 in the search box to learn more about \"Influenza (Flu): Care Instructions. \"  Current as of: May 23, 2016  Content Version: 11.1  © 8275-8103 [employer]. Care instructions adapted under license by [employer] (which disclaims liability or warranty for this information). If you have questions about a medical condition or this instruction, always ask your healthcare professional. [address] any warranty or liability for your use of this information.

## 2017-02-23 NOTE — MR AVS SNAPSHOT
Visit Information Date & Time Provider Department Dept. Phone Encounter #  
 2/23/2017  9:45 AM [facility] [phone] Your Appointments 4/26/2017  2:00 PM  
ROUTINE CARE with SH[facility]L Melton [facility] ([facility]) Appt Note: DM follow up  
 [address], [address] [facility] [address]  
[address], [address] [facility]  
  
    
 9/25/2017 11:40 AM  
Any with Travis Velásquez MD  
[address] [place] [address] ([facility]) Appt Note: YRLY CPAP FOLLOW UP  
 [address] [facility] [address]  
  
   
 [address] [facility] [address] Upcoming Health Maintenance Date Due  
 EYE EXAM RETINAL OR DILATED Q1 4/21/2016 MEDICARE YEARLY EXAM 6/5/2016 GLAUCOMA SCREENING Q2Y 4/21/2017 LIPID PANEL Q1 7/21/2017 HEMOGLOBIN A1C Q6M 7/26/2017 FOOT EXAM Q1 11/16/2017 MICROALBUMIN Q1 1/26/2018 DTaP/Tdap/Td series (2 - Td) 9/14/2025 Allergies as of 2/23/2017  Review Complete On: 2/23/2017 By: Juventino Landaverde NP Severity Noted Reaction Type Reactions Januvia [Sitagliptin] Medium 07/23/2014   Side Effect Rash  
 B12 [Cyanocobalamin-cobamamide]  12/19/2011    Hives  
 w/injection Decongestant [Brompheniramine Maleate]  12/19/2011    Other (comments)  
 heart Current Immunizations  Reviewed on 1/26/2017 Name Date Influenza High Dose Vaccine PF 10/15/2016, 10/31/2015 Influenza Vaccine 10/15/2014, 10/16/2013 Influenza Vaccine Whole 10/8/2010 Pneumococcal Conjugate (PCV-13) 10/31/2015 Pneumococcal Polysaccharide (PPSV-23) 1/26/2017 Tdap 9/14/2015 Zoster 12/1/2009 Not reviewed this visit You Were Diagnosed With   
  
 Codes Comments Influenza    -  Primary ICD-10-CM: J11.1 ICD-9-CM: 352.4 Fever, unspecified fever cause     ICD-10-CM: R50.9 ICD-9-CM: 780.60 Cough     ICD-10-CM: R05 ICD-9-CM: 829. 2 Vitals BP  
  
  
  
  
  
 148/82 (BP 1 Location: Right arm, BP Patient Position: Sitting) BMI and BSA Data Body Mass Index Body Surface Area  
 33.91 kg/m 2 1.78 m 2 [facility], [address] [place] [address] [phone] Your Updated Medication List  
  
   
This list is accurate as of: 2/23/17 10:20 AM.  Always use your most recent med list.  
  
  
  
  
 Talisha Bibles Take  by mouth daily. amLODIPine 2.5 mg tablet Commonly known as:  Zaid Alstrom Take  by mouth daily. Pt unsure of dosage  
  
 aspirin delayed-release 81 mg tablet Take  by mouth daily. Every other day  
  
 biotin 2,500 mcg Tab Take 5,000 Units by mouth. diclofenac 1 % Gel Commonly known as:  VOLTAREN  
APPLY 2GM TO AFFECTED AREA FOUR TIMES A DAY  
  
 finasteride 5 mg tablet Commonly known as:  PROSCAR Take 5 mg by mouth daily. fluticasone 50 mcg/actuation nasal spray Commonly known as:  Lindalee Pal[place] 2 Sprays by Both Nostrils route daily. glipiZIDE 5 mg tablet Commonly known as:  Seldon Jackie Take 1 and half tabs with breakfast and 1 tab with dinner  
  
 glucose blood VI test strips strip Commonly known as:  ONETOUCH ULTRA TEST  
TEST BLOOD SUGAR Twice DAILY DX Code: E11.9 LORazepam 0.5 mg tablet Commonly known as:  ATIVAN Take 0.5 mg by mouth daily as needed. metFORMIN 500 mg tablet Commonly known as:  GLUCOPHAGE Take 1 Tab by mouth two (2) times daily (with meals). MULTIVITAMIN PO Take  by mouth. VITAMIN B-12 500 mcg tablet Generic drug:  cyanocobalamin Take 500 mcg by mouth daily. VITAMIN D3 1,000 unit Cap Generic drug:  cholecalciferol Take  by mouth. Prescriptions Sent to Pharmacy Refills fluticasone (FLONASE) 50 mcg/actuation nasal spray 0 Si Sprays by Both Nostrils route daily. Class: Normal  
 Pharmacy: [facility] [place], [address] [facility] [address]. [address]  #: [phone] Route: Both Nostrils We Performed the Following AMB POC RAPID INFLUENZA TEST [69609 CPT(R)] Patient Instructions Influenza (Flu): Care Instructions Your Care Instructions Influenza (flu) is an infection in the lungs and breathing passages. It is caused by the influenza virus. There are different strains, or types, of the flu virus from year to year. Unlike the common cold, the flu comes on suddenly and the symptoms, such as a cough, congestion, fever, chills, fatigue, aches, and pains, are more severe. These symptoms may last up to 10 days. Although the flu can make you feel very sick, it usually doesn't cause serious health problems. Home treatment is usually all you need for flu symptoms. But your doctor may prescribe antiviral medicine to prevent other health problems, such as pneumonia, from developing. Older people and those who have a long-term health condition, such as lung disease, are most at risk for having pneumonia or other health problems. Follow-up care is a key part of your treatment and safety. Be sure to make and go to all appointments, and call your doctor if you are having problems. Its also a good idea to know your test results and keep a list of the medicines you take. How can you care for yourself at home? · Get plenty of rest. 
· Drink plenty of fluids, enough so that your urine is light yellow or clear like water. If you have kidney, heart, or liver disease and have to limit fluids, talk with your doctor before you increase the amount of fluids you drink.  
· Take an over-the-counter pain medicine if needed, such as acetaminophen (Tylenol), ibuprofen (Advil, Motrin), or naproxen (Aleve), to relieve fever, headache, and muscle aches. Read and follow all instructions on the label. No one younger than 20 should take aspirin. It has been linked to Reye syndrome, a serious illness. · Do not smoke. Smoking can make the flu worse. If you need help quitting, talk to your doctor about stop-smoking programs and medicines. These can increase your chances of quitting for good. · Breathe moist air from a hot shower or from a sink filled with hot water to help clear a stuffy nose. · Before you use cough and cold medicines, check the label. These medicines may not be safe for young children or for people with certain health problems. · If the skin around your nose and lips becomes sore, put some petroleum jelly on the area. · To ease coughing: ¨ Drink fluids to soothe a scratchy throat. ¨ Suck on cough drops or plain hard candy. ¨ Take an over-the-counter cough medicine that contains dextromethorphan to help you get some sleep. Read and follow all instructions on the label. ¨ Raise your head at night with an extra pillow. This may help you rest if coughing keeps you awake. · Take any prescribed medicine exactly as directed. Call your doctor if you think you are having a problem with your medicine. To avoid spreading the flu · Wash your hands regularly, and keep your hands away from your face. · Stay home from school, work, and other public places until you are feeling better and your fever has been gone for at least 24 hours. The fever needs to have gone away on its own without the help of medicine. · Ask people living with you to talk to their doctors about preventing the flu. They may get antiviral medicine to keep from getting the flu from you. · To prevent the flu in the future, get a flu vaccine every fall. Encourage people living with you to get the vaccine. · Cover your mouth when you cough or sneeze. When should you call for help? Call 911 anytime you think you may need emergency care. For example, call if: 
· You have severe trouble breathing. Call your doctor now or seek immediate medical care if: 
· You have new or worse trouble breathing. · You seem to be getting much sicker. · You feel very sleepy or confused. · You have a new or higher fever. · You get a new rash. Watch closely for changes in your health, and be sure to contact your doctor if: 
· You begin to get better and then get worse. · You are not getting better after 1 week. Where can you learn more? Go to http://idania-skip.info/. Enter I094 in the search box to learn more about \"Influenza (Flu): Care Instructions. \" Current as of: May 23, 2016 Content Version: 11.1 © 3956-6336 [employer]. Care instructions adapted under license by [employer] (which disclaims liability or warranty for this information). If you have questions about a medical condition or this instruction, always ask your healthcare professional. [place] [address] any warranty or liability for your use of this information. Introducing [facility] & HEALTH SERVICES! Dear Graham Koyanagi: Thank you for requesting a ScaleIO account. Our records indicate that you already have an active ScaleIO account. You can access your account anytime at [email or web address] Did you know that you can access your hospital and ER discharge instructions at any time in ScaleIO? You can also review all of your test results from your hospital stay or ER visit. Additional Information If you have questions, please visit the Frequently Asked Questions section of the ScaleIO website at [email or web address] Remember, ScaleIO is NOT to be used for urgent needs. For medical emergencies, dial 911. Now available from your iPhone and Android! Please provide this summary of care documentation to your next provider. Your primary care clinician is listed as Ana Lin. If you have any questions after today's visit, please call [phone].

## 2017-04-07 ENCOUNTER — TELEPHONE (OUTPATIENT)
Dept: INTERNAL MEDICINE CLINIC | Age: 77
End: 2017-04-07

## 2017-04-26 ENCOUNTER — OFFICE VISIT (OUTPATIENT)
Dept: INTERNAL MEDICINE CLINIC | Age: 77
End: 2017-04-26

## 2017-04-26 VITALS
DIASTOLIC BLOOD PRESSURE: 70 MMHG | HEIGHT: 59 IN | TEMPERATURE: 98 F | OXYGEN SATURATION: 98 % | BODY MASS INDEX: 33.91 KG/M2 | HEART RATE: 81 BPM | SYSTOLIC BLOOD PRESSURE: 130 MMHG | WEIGHT: 168.2 LBS | RESPIRATION RATE: 16 BRPM

## 2017-04-26 DIAGNOSIS — E11.9 CONTROLLED TYPE 2 DIABETES MELLITUS WITHOUT COMPLICATION, WITHOUT LONG-TERM CURRENT USE OF INSULIN (HCC): Primary | ICD-10-CM

## 2017-04-26 LAB — HBA1C MFR BLD HPLC: 7.2 %

## 2017-04-26 RX ORDER — MOMETASONE FUROATE 1 MG/G
CREAM TOPICAL
Refills: 3 | COMMUNITY
Start: 2017-03-20 | End: 2017-08-15

## 2017-04-26 NOTE — PROGRESS NOTES
63 yo female in early for her 4 mo DM f/u. She is still not finding good glucose checks. She went to a diabetic nutrition session, and she feels like her eating schedule rather than her food choices are where her problem is. Her family is interested in her seeing an Endocrinologist.    She eats breakfast around 10:30 am, lunch is light around 3 pm, then dinner 8-8:30 pm.  Bedtime is around midnight. Bk is fruit, cereal w/1/2 c milk, decaf milk, and 6 oz light OJ. (sometimes pancakes w/ sugar-free syrup, sometimes light yogurt and toast, sometimes chipped beef on toast). Lunch typically is cup of soup and grilled cheese or peanut butter on crackers or she goes out ([employer] sandwich or [employer] nuggets). Dinner can be stuffed peppers, mashed potatoes or pork chop w/ w canned vegies - jello salad; on choir night - a bowl of soup and crackers w/ cheese spread). She drinks a glass of wine about every 3 weeks. Ice cream 1/2 cup for dessert is not often. 1 cookie after dinner most days. She may eat a handful of almonds. She admittedly does not exercise. PE: WNWD WF   Weight - stable   BP - 130/70   Glucose record is reviewed. She is checking her fasting sugars, but the PM ones sound like they are rather random and not 2-2 & 1/2 hrs pp)   A1cs have been steadily increasing since Jan 2016   Amb POC A1c =  7.2 (was 7.3 in Jan)    Imp: DM - Type 2 - inadequately controlled    Plan: Refer to Endocrinology   Will not change meds for now   4 mo f/u w/ PCP is advised - she is considering moving to a practice where she can have a female MD as PCP  ________________________  Expected course of current diagnosed problem(s) as well as expected progression and possible complications, and desired follow up with provider are discussed with patient. Patient is encouraged to be back in touch with any questions or concerns. Patient expresses understanding of plan of care.     Patient is given AVS which includes diagnoses, current medications, vitals.

## 2017-04-26 NOTE — MR AVS SNAPSHOT
Visit Information Date & Time Provider Department Dept. Phone Encounter #  
 4/26/2017  2:00 PM [place] [address] [facility] [phone] [id number] Your Appointments 9/25/2017 11:40 AM  
Any with Ash Walsh MD  
[address] [facility] ([address] [facility]) Appt Note: YRLY CPAP FOLLOW UP  
 [address] [facility] [address] [facility] [address]  
  
   
 [address] [facility] [address] [place] [id number] Upcoming Health Maintenance Date Due  
 EYE EXAM RETINAL OR DILATED Q1 4/21/2016 MEDICARE YEARLY EXAM 6/5/2016 GLAUCOMA SCREENING Q2Y 4/21/2017 LIPID PANEL Q1 7/21/2017 HEMOGLOBIN A1C Q6M 7/26/2017 FOOT EXAM Q1 11/16/2017 MICROALBUMIN Q1 1/26/2018 DTaP/Tdap/Td series (2 - Td) 9/14/2025 Allergies as of 4/26/2017  Review Complete On: 4/26/2017 By: Daja Sanchez NP Severity Noted Reaction Type Reactions Januvia [Sitagliptin] Medium 07/23/2014   Side Effect Rash  
 B12 [Cyanocobalamin-cobamamide]  12/19/2011    Hives  
 w/injection Decongestant [Brompheniramine Maleate]  12/19/2011    Other (comments)  
 heart Current Immunizations  Reviewed on 1/26/2017 Name Date Influenza High Dose Vaccine PF 10/15/2016, 10/31/2015 Influenza Vaccine 10/15/2014, 10/16/2013 Influenza Vaccine Whole 10/8/2010 Pneumococcal Conjugate (PCV-13) 10/31/2015 Pneumococcal Polysaccharide (PPSV-23) 1/26/2017 Tdap 9/14/2015 Zoster 12/1/2009 Not reviewed this visit You Were Diagnosed With   
  
 Codes Comments Controlled type 2 diabetes mellitus without complication, without long-term current use of insulin ([facility]    -  Primary ICD-10-CM: E11.9 ICD-9-CM: 250.00 Vitals BP Pulse Temp Resp Height(growth percentile) Weight(growth percentile) 130/70 (BP 1 Location: Left arm, BP Patient Position: Sitting) 81 98 °F (36.7 °C) (Oral) 16 4' 10.66\" (1.49 m) 168 lb 3.2 oz (76.3 kg) SpO2 BMI OB Status Smoking Status 98% 34.37 kg/m2 Postmenopausal Never Smoker BMI and BSA Data Body Mass Index Body Surface Area  
 34.37 kg/m 2 1.78 m 2 Preferred Pharmacy Pharmacy Name Phone [facility] [address] [phone] Your Updated Medication List  
  
   
This list is accurate as of: 4/26/17  3:04 PM.  Always use your most recent med list.  
  
  
  
  
 Anibal Lucio Take  by mouth daily. amLODIPine 2.5 mg tablet Commonly known as:  Claudell Hesselbach Take  by mouth daily. Pt unsure of dosage  
  
 aspirin delayed-release 81 mg tablet Take  by mouth daily. Every other day  
  
 biotin 2,500 mcg Tab Take 5,000 Units by mouth. diclofenac 1 % Gel Commonly known as:  VOLTAREN  
APPLY 2GM TO AFFECTED AREA FOUR TIMES A DAY  
  
 finasteride 5 mg tablet Commonly known as:  PROSCAR Take 5 mg by mouth daily. fluticasone 50 mcg/actuation nasal spray Commonly known as:  Namrata Alexis 2 Sprays by Both Nostrils route daily. glipiZIDE 5 mg tablet Commonly known as:  Jagdish Sauger Take 1 and half tabs with breakfast and 1 tab with dinner  
  
 glucose blood VI test strips strip Commonly known as:  ONETOUCH ULTRA TEST  
TEST BLOOD SUGAR Twice DAILY DX Code: E11.9 LORazepam 0.5 mg tablet Commonly known as:  ATIVAN Take 0.5 mg by mouth daily as needed. metFORMIN 500 mg tablet Commonly known as:  GLUCOPHAGE Take 1 Tab by mouth two (2) times daily (with meals). mometasone 0.1 % topical cream  
Commonly known as:  ELOCON  
USE ON RASH (EXCEPT FACE) TWICE DAILY AS NEEDED MULTIVITAMIN PO Take  by mouth. VITAMIN B-12 500 mcg tablet Generic drug:  cyanocobalamin Take 500 mcg by mouth daily. VITAMIN D3 1,000 unit Cap Generic drug:  cholecalciferol Take  by mouth. We Performed the Following AMB POC HEMOGLOBIN A1C [87522 CPT(R)] REFERRAL TO ENDOCRINOLOGY [HWI37 Custom] Comments:  
 Please evaluate patient for Diabetes management. Referral Information Referral ID Referred By Referred To  
  
 [id number], [address] [place] [address] Drive MD [place] [address] [address] Phone: [phone] Fax: [phone] Visits Status Start Date End Date 1 New Request 4/26/17 4/26/18 If your referral has a status of pending review or denied, additional information will be sent to support the outcome of this decision. Introducing [facility] & HEALTH SERVICES! Dear Talat Villeda: Thank you for requesting a zerobound account. Our records indicate that you already have an active zerobound account. You can access your account anytime at [email or web address] Did you know that you can access your hospital and ER discharge instructions at any time in zerobound? You can also review all of your test results from your hospital stay or ER visit. Additional Information If you have questions, please visit the Frequently Asked Questions section of the zerobound website at [email or web address] Remember, zerobound is NOT to be used for urgent needs. For medical emergencies, dial 911. Now available from your iPhone and Android! Please provide this summary of care documentation to your next provider. Your primary care clinician is listed as [address]. If you have any questions after today's visit, please call [phone].

## 2017-06-05 ENCOUNTER — OFFICE VISIT (OUTPATIENT)
Dept: ENDOCRINOLOGY | Age: 77
End: 2017-06-05

## 2017-06-05 VITALS
HEART RATE: 102 BPM | DIASTOLIC BLOOD PRESSURE: 79 MMHG | HEIGHT: 59 IN | BODY MASS INDEX: 32.46 KG/M2 | SYSTOLIC BLOOD PRESSURE: 129 MMHG | WEIGHT: 161 LBS

## 2017-06-05 DIAGNOSIS — E11.9 CONTROLLED TYPE 2 DIABETES MELLITUS WITHOUT COMPLICATION, WITHOUT LONG-TERM CURRENT USE OF INSULIN (HCC): Primary | ICD-10-CM

## 2017-06-05 NOTE — MR AVS SNAPSHOT
Visit Information Date & Time Provider Department Dept. Phone Encounter #  
 6/5/2017  1:30 PM Geneva Mckenzie, [facility] [phone] [id number] Follow-up Instructions Return in about 6 months (around 12/5/2017). Your Appointments 8/15/2017  2:00 PM  
New Patient with Eric Grove MD  
[address] [facility]) Appt Note: est new patient [address] [facility] [address]  
[address] [address] [address] [facility]  
  
    
 9/25/2017 11:40 AM  
Any with Osiris Bedolla MD  
[address] [facility] ([facility]) Appt Note: YRLY CPAP FOLLOW UP  
 [address] [facility] [address]  
  
   
 [address] [address] Upcoming Health Maintenance Date Due  
 EYE EXAM RETINAL OR DILATED Q1 4/21/2016 MEDICARE YEARLY EXAM 6/5/2016 GLAUCOMA SCREENING Q2Y 4/21/2017 LIPID PANEL Q1 7/21/2017 INFLUENZA AGE 9 TO ADULT 8/1/2017 HEMOGLOBIN A1C Q6M 10/26/2017 FOOT EXAM Q1 11/16/2017 MICROALBUMIN Q1 1/26/2018 DTaP/Tdap/Td series (2 - Td) 9/14/2025 Allergies as of 6/5/2017  Review Complete On: 6/5/2017 By: Geneva Mckenzie MD  
  
 Severity Noted Reaction Type Reactions Januvia [Sitagliptin] Medium 07/23/2014   Side Effect Rash  
 B12 [Cyanocobalamin-cobamamide]  12/19/2011    Hives  
 w/injection Decongestant [Brompheniramine Maleate]  12/19/2011    Other (comments)  
 heart Current Immunizations  Reviewed on 1/26/2017 Name Date Influenza High Dose Vaccine PF 10/15/2016, 10/31/2015 Influenza Vaccine 10/15/2014, 10/16/2013 Influenza Vaccine Whole 10/8/2010 Pneumococcal Conjugate (PCV-13) 10/31/2015 Pneumococcal Polysaccharide (PPSV-23) 1/26/2017 Tdap 9/14/2015 Zoster 12/1/2009 Not reviewed this visit You Were Diagnosed With   
  
 Codes Comments Controlled type 2 diabetes mellitus without complication, without long-term current use of insulin ([facility]    -  Primary ICD-10-CM: E11.9 ICD-9-CM: 250.00 Vitals BP Pulse Height(growth percentile) Weight(growth percentile) BMI OB Status 129/79 (!) 102 4' 10.66\" (1.49 m) 161 lb (73 kg) 32.9 kg/m2 Postmenopausal  
 Smoking Status Never Smoker Vitals History BMI and BSA Data Body Mass Index Body Surface Area 32.9 kg/m 2 1.74 m 2 Preferred Pharmacy Pharmacy Name Phone [facility] [address], [address] [place] [address] [phone] Your Updated Medication List  
  
   
This list is accurate as of: 6/5/17  2:22 PM.  Always use your most recent med list.  
  
  
  
  
 Giovanni Shine Take  by mouth daily. amLODIPine 2.5 mg tablet Commonly known as:  Karyle Rohrer Take  by mouth daily. Pt unsure of dosage  
  
 aspirin delayed-release 81 mg tablet Take  by mouth daily. Every other day  
  
 biotin 2,500 mcg Tab Take 5,000 Units by mouth. diclofenac 1 % Gel Commonly known as:  VOLTAREN  
APPLY 2GM TO AFFECTED AREA FOUR TIMES A DAY  
  
 finasteride 5 mg tablet Commonly known as:  PROSCAR Take 5 mg by mouth daily. fluticasone 50 mcg/actuation nasal spray Commonly known as:  Skipper Or 2 Sprays by Both Nostrils route daily. glipiZIDE 5 mg tablet Commonly known as:  Dimple Coral Take 1 and half tabs with breakfast and 1 tab with dinner  
  
 glucose blood VI test strips strip Commonly known as:  ONETOUCH ULTRA TEST  
TEST BLOOD SUGAR Twice DAILY DX Code: E11.9 LORazepam 0.5 mg tablet Commonly known as:  ATIVAN Take 0.5 mg by mouth daily as needed. metFORMIN 500 mg tablet Commonly known as:  GLUCOPHAGE Take 1 Tab by mouth two (2) times daily (with meals).   
  
 mometasone 0.1 % topical cream  
Commonly known as:  ELOCON  
USE ON RASH (EXCEPT FACE) TWICE DAILY AS NEEDED  
  
 MULTIVITAMIN PO Take  by mouth. VITAMIN B-12 500 mcg tablet Generic drug:  cyanocobalamin Take 500 mcg by mouth daily. VITAMIN D3 1,000 unit Cap Generic drug:  cholecalciferol Take  by mouth. We Performed the Following HEMOGLOBIN A1C WITH EAG [33460 CPT(R)] MICROALBUMIN, UR, RAND W/ MICROALBUMIN/CREA RATIO [id number] CPT(R)] TSH AND FREE T4 [09858 CPT(R)] Follow-up Instructions Return in about 6 months (around 12/5/2017). Patient Instructions Diabetes Instructions: 
- continue taking glipizide 7.5mg every morning before breakfast; 5mg every evening before dinner (aim for 30 minutes before meals) 
- continue metformin 500mg twice a day Check blood sugar at least TWICE a day: every morning when you wake up and at bedtime. Keep a record of your values and bring this or your glucometer with you to your next visit. Goal blood sugar: . Notify me for blood sugars less than 70. Start exercising: aim for at least 20 minutes/day 5 days/week Diet instructions: 
Reduce the amount of carbohydrates in your diet. This means not just avoiding sweets, sodas, or desserts but also reducing the amount of bread, pasta, rice, potatoes, corn, and crackers that you eat. Do not have more than one serving of carbs per meal (for example: do not eat a sandwich and potato chips in the same meal). Focus on eating mostly protein (meat, poultry, fish, shellfish, eggs), healthy fats (avocados, nuts, cheese, olive or coconut oil) and non-starchy vegetables (greens, carrots, tomatoes, bell peppers, broccoli, brussels sprouts, green beans, etc). If you fill yourself up with these foods, you won't even want the carbs. Introducing [facility]! Dear Johnny Godwin: Thank you for requesting a [employer] account. Our records indicate that you already have an active [employer] account. You can access your account anytime at [email or web address] Did you know that you can access your hospital and ER discharge instructions at any time in [employer]? You can also review all of your test results from your hospital stay or ER visit. Additional Information If you have questions, please visit the Frequently Asked Questions section of the [employer] website at [email or web address] Remember, [employer] is NOT to be used for urgent needs. For medical emergencies, dial 911. Now available from your iPhone and Android! Please provide this summary of care documentation to your next provider. Your primary care clinician is listed as Mira Morgan. If you have any questions after today's visit, please call [phone].

## 2017-06-05 NOTE — PROGRESS NOTES
Endocrinology New Patient Visit    Chief Complaint: Type 2 diabetes    Referring provider: Geovany Bedoya MD    History of Present Illness: Juliana James is a 64 y.o. female who was referred for evaluation of controlled type 2 diabetes mellitus with last A1c 7.3% in January. This had increased from her previous values of 6.6 - 6.7% without obvious precipitant so she requested to see an endocrinologist. PMH is also notable for FELIX - wears a CPAP nightly. She was diagnosed with diabetes in December 2000 at a health fair screening. She was initially treated with metformin. She has had GI side effects when on the max dose of metformin. When she took the XR form, she noticed pills were coming out un-dissolved in her stool. Current glycemic medication regimen is metformin 500mg BID and glipizide 7.5mg Qam before breakfast and 5mg with dinner. Home blood glucose monitoring frequency: twice a day  Glucose range at home: ; 47d avg 135; appears to trend up overnight  The patient has not had hypoglycemia recently. She does not have symptoms unless blood sugar is less than ~60. She has no known complications from diabetes. Has cataracts but no diabetic retinopathy. Weight is decreased about 14 lbs since November. BMI is 32.9. She gained about 10 lbs from March to Nov last year, then lost weight after the holidays. She has had to do more for her  due to his recent MI and feels that increased physical activity has helped her lose weight. She does not participate in dedicated exercise but plans to start going with her  to water aerobics and cardiac rehab. She does try to restrict dietary carbohydrate intake somewhat; also watching her sodium intake because her  has been advised to follow a low-sodium diet. Eats 3 meals/day and does snack.  Typical meals are as follows:  Breakfast: cold cereal, yogurt, instant oatmeal, wheat toast, juice - Trop50 (4-6 oz) or V8  Lunch: salads, fruit, sandwich  Dinner: chicken, fish, pork, vegetables    Review of Systems   General ROS: negative  Ophthalmic ROS: negative  ENT ROS: negative  Endocrine ROS: negative  Respiratory ROS: no cough, shortness of breath, or wheezing  Cardiovascular ROS: no chest pain or dyspnea on exertion  Gastrointestinal ROS: no abdominal pain, change in bowel habits, or black or bloody stools  Genito-Urinary ROS: no dysuria, trouble voiding, or hematuria  Musculoskeletal ROS: negative  Neurological ROS: negative for - numbness/tingling  Dermatological ROS: negative    Problem List:  Patient Active Problem List   Diagnosis Code    Diabetes mellitus type 2, controlled ([facility] E11.9    Urge incontinence of urine N39.41    Atrial premature depolarization I49.1    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 9) E66.9    Chronic prescription benzodiazepine use Z79.899    Mitral valve prolapse, nonrheumatic I34.1    Osteoarthritis involving multiple joints on both sides of body M15.9       Past Medical History:    Past Medical History:   Diagnosis Date    Diabetes ([facility]     adult-onset    IBS (irritable bowel syndrome)     Irregular heart beat     (PVCs)    MVP (mitral valve prolapse)     h/o (neg ECHO-3/06)       Past Surgical History:  Past Surgical History:   Procedure Laterality Date    HX HEENT      tonsilectomy    HX HYSTERECTOMY      partial Hysterectomy    HX ORTHOPAEDIC      left knee-arthroscopic    HX ORTHOPAEDIC      carpal tunnel       Social History:  Social History     Social History    Marital status:      Spouse name: N/A    Number of children: N/A    Years of education: N/A     Occupational History    Not on file.      Social History Main Topics    Smoking status: Never Smoker    Smokeless tobacco: Never Used  Alcohol use 0.0 oz/week     0 Standard drinks or equivalent per week      Comment: occassionally    Drug use: No    Sexual activity: No     Other Topics Concern    Not on file     Social History Narrative       Family History:  Family History   Problem Relation Age of Onset    Heart Failure Father      CHF    Emphysema Father      (smoker)    COPD Father     Arthritis-osteo Mother     Arrhythmia Mother     Heart Attack Mother     Heart Attack Maternal Grandmother        Medications:     Current Outpatient Prescriptions:     mometasone (ELOCON) 0.1 % topical cream, USE ON RASH (EXCEPT FACE) TWICE DAILY AS NEEDED, Disp: , Rfl: 3    glipiZIDE (GLUCOTROL) 5 mg tablet, Take 1 and half tabs with breakfast and 1 tab with dinner, Disp: 225 Tab, Rfl: 1    glucose blood VI test strips (ONETOUCH ULTRA TEST) strip, TEST BLOOD SUGAR Twice DAILY DX Code: E11.9, Disp: 200 Strip, Rfl: 3    amLODIPine (NORVASC) 2.5 mg tablet, Take  by mouth daily. Pt unsure of dosage, Disp: , Rfl:     metFORMIN (GLUCOPHAGE) 500 mg tablet, Take 1 Tab by mouth two (2) times daily (with meals). , Disp: 180 Tab, Rfl: 1    cyanocobalamin (VITAMIN B-12) 500 mcg tablet, Take 500 mcg by mouth daily. , Disp: , Rfl:     finasteride (PROSCAR) 5 mg tablet, Take 5 mg by mouth daily. , Disp: , Rfl:     biotin 2,500 mcg tab, Take 5,000 Units by mouth., Disp: , Rfl:     Cholecalciferol, Vitamin D3, (VITAMIN D3) 1,000 unit cap, Take  by mouth., Disp: , Rfl:     MULTIVITAMIN PO, Take  by mouth., Disp: , Rfl:     NAPROXEN SODIUM (ALEVE PO), Take  by mouth daily. , Disp: , Rfl:     aspirin delayed-release 81 mg tablet, Take  by mouth daily. Every other day, Disp: , Rfl:     LORazepam (ATIVAN) 0.5 mg tablet, Take 0.5 mg by mouth daily as needed. , Disp: , Rfl:     fluticasone (FLONASE) 50 mcg/actuation nasal spray, 2 Sprays by Both Nostrils route daily. , Disp: 1 Bottle, Rfl: 0    diclofenac (VOLTAREN) 1 % gel, APPLY 2GM TO AFFECTED AREA FOUR TIMES A DAY, Disp: , Rfl: 2    Allergies: Allergies   Allergen Reactions    Januvia [Sitagliptin] Rash    B12 [Cyanocobalamin-Cobamamide] Hives     w/injection    Decongestant [Brompheniramine Maleate] Other (comments)     heart       Physical Examination:  Blood pressure 129/79, pulse (!) 102, height 4' 10.66\" (1.49 m), weight 161 lb (73 kg). Gen: no acute distress  HEENT: mucous membranes moist  Thyroid: no enlargement or nodules noted  CAD: normal rate, regular rhythm. No murmur rubs or gallops  PULM: clear to ausculation, no wheezes, rhonchis or rales.   EXT: no clubbing, cyanosis or edema; PT pulses 2+  Neuro: grossly non focal, normal DTRs  Psych: pleasant, good insight into medical hx  Skin: warm, dry      Clinical Data Review:  Lab Results   Component Value Date/Time    Hemoglobin A1c 7.3 01/26/2017 12:52 PM    Hemoglobin A1c (POC) 7.2 04/26/2017 02:57 PM     Lab Results   Component Value Date/Time    Sodium 141 01/26/2017 12:52 PM    Potassium 4.1 01/26/2017 12:52 PM    Chloride 100 01/26/2017 12:52 PM    CO2 23 01/26/2017 12:52 PM    Anion gap 11 02/02/2010 09:45 AM    Glucose 171 01/26/2017 12:52 PM    BUN 12 01/26/2017 12:52 PM    Creatinine 0.50 01/26/2017 12:52 PM    BUN/Creatinine ratio 24 01/26/2017 12:52 PM    GFR est  01/26/2017 12:52 PM    GFR est non-AA 93 01/26/2017 12:52 PM    Calcium 9.5 01/26/2017 12:52 PM     Lab Results   Component Value Date/Time    Microalb/Creat ratio (ug/mg creat.) 13.1 03/16/2015 10:09 AM    Microalbumin, urine 11.5 03/16/2015 10:09 AM     Lab Results   Component Value Date/Time    Cholesterol, total 169 07/21/2016 10:10 AM    HDL Cholesterol 55 07/21/2016 10:10 AM    LDL, calculated 89 07/21/2016 10:10 AM    VLDL, calculated 25 07/21/2016 10:10 AM    Triglyceride 124 07/21/2016 10:10 AM    CHOL/HDL Ratio 2.9 02/02/2010 09:45 AM       Assessment and Plan:  Patient is a 64 y.o. female here for type 2 diabetes, control of which appears to be at goal based on fingerstick glucose data (avg of 135 corresponds to an A1c <7%). We discussed that goal A1c for her is less than ~7.5%. I also pointed out that her blood sugars worsened when she gained weight last year and have improved since she lost 14 lbs. We discussed strategies for keeping off that weight and I encouraged her to start an exercise program. Continue current medication regimen for now and advised her to start taking her evening glipizide 30 minutes before dinner. If this fails to improve her fasting morning blood sugars, may have her increase her evening metformin dose to 1000mg. Glycemic Medication Changes:  - continue metformin 500mg BID  - continue glipizide 7.5mg Qam, 5mg Qpm (30 min AC meals)  - notify me for BG<70    DM Health Maintenance: pertinent items updated in HM tab  A1c: update today  Cv/Lipids: not on statin, discussed recommendation for this and will revisit at RTC  BP/Renal: BP at goal, not on ACEi, microalbumin: update today to see if initiation of low-dose lisinopril is warranted  Vaccines: per PCP  Podiatry: no active issues, encouraged well-fitting footwear and daily inspection  Neuro: no evidence of neuropathy  Ophtho: yearly eye exam recommended  Diet and exercise: discussed healthy eating and exercise recommendations, particularly reduction in dietary carbohydrates    We have discussed the importance of the need for good blood pressure and glycemic control to further reduce the risks of microvascular and macrovascular complications. We have discussed how to recognize and treat hypoglycemia. She will notify me in between appointments for hypoglycemia or persistent hyperglycemia and has my contact information. I spent 45 minutes with the patient today and > 50% of the time was spent counseling the patient about diabetes management including medication options and dietary modification. Patient verbalized an understanding and will return to clinic in 6 months.  Thank you for the opportunity to participate in this patient's care.     Magaly Vergara MD  [facility]  [address] [place] [address]

## 2017-06-05 NOTE — PATIENT INSTRUCTIONS
Diabetes Instructions:  - continue taking glipizide 7.5mg every morning before breakfast; 5mg every evening before dinner (aim for 30 minutes before meals)  - continue metformin 500mg twice a day    Check blood sugar at least TWICE a day: every morning when you wake up and at bedtime. Keep a record of your values and bring this or your glucometer with you to your next visit. Goal blood sugar: . Notify me for blood sugars less than 70. Start exercising: aim for at least 20 minutes/day 5 days/week      Diet instructions:  Reduce the amount of carbohydrates in your diet. This means not just avoiding sweets, sodas, or desserts but also reducing the amount of bread, pasta, rice, potatoes, corn, and crackers that you eat. Do not have more than one serving of carbs per meal (for example: do not eat a sandwich and potato chips in the same meal). Focus on eating mostly protein (meat, poultry, fish, shellfish, eggs), healthy fats (avocados, nuts, cheese, olive or coconut oil) and non-starchy vegetables (greens, carrots, tomatoes, bell peppers, broccoli, brussels sprouts, green beans, etc). If you fill yourself up with these foods, you won't even want the carbs.     Look up the [address] Diet - proven to be heart healthy and to help maintain a healthy weight

## 2017-06-06 LAB
ALBUMIN/CREAT UR: 11.2 MG/G CREAT (ref 0–30)
CREAT UR-MCNC: 66.7 MG/DL
EST. AVERAGE GLUCOSE BLD GHB EST-MCNC: 140 MG/DL
HBA1C MFR BLD: 6.5 % (ref 4.8–5.6)
MICROALBUMIN UR-MCNC: 7.5 UG/ML
T4 FREE SERPL-MCNC: 1.27 NG/DL (ref 0.82–1.77)
TSH SERPL DL<=0.005 MIU/L-ACNC: 1.87 UIU/ML (ref 0.45–4.5)

## 2017-07-07 NOTE — TELEPHONE ENCOUNTER
7/7/2017  2:28 PM      Ms. Hanley  would like a new prescription for test strips testing 2x a day need 2 boxes with 100 strips in each box    -glucose blood VI test strips (ONETOUCH ULTRA TEST) strip        [facility] [address]      Thanks

## 2017-07-07 NOTE — TELEPHONE ENCOUNTER
Requested Prescriptions     Pending Prescriptions Disp Refills    glucose blood VI test strips (ONETOUCH ULTRA TEST) strip 200 Strip 3     Sig: TEST BLOOD SUGAR Twice DAILY DX Code: E11.9     Patient has an appointment with  in august.  Last visit with us was 04/26/2017  Please send script to chris on file

## 2017-07-07 NOTE — TELEPHONE ENCOUNTER
Due to pt's refill request is pending in pt's [facility] pcp's box, we are unable to honor therefill request. Pt was made aware via voicemail.

## 2017-07-10 NOTE — TELEPHONE ENCOUNTER
Pt stated that she is scheduled to see Dr Jacobo Samson for her initial visit in August and was told that since she has not seen her yet to have Dr Luis Felipe Quinn to renew this request.

## 2017-07-27 DIAGNOSIS — E11.9 CONTROLLED TYPE 2 DIABETES MELLITUS WITHOUT COMPLICATION, WITHOUT LONG-TERM CURRENT USE OF INSULIN (HCC): ICD-10-CM

## 2017-07-27 NOTE — TELEPHONE ENCOUNTER
7/27/2017  3:51 PM      Ms. Rosie Nelson would like a refill for    -metFORMIN (GLUCOPHAGE) 500 mg tablet         Please send to [facility] [address] [place] [address], [place] [address] [address]    Thanks

## 2017-07-28 RX ORDER — METFORMIN HYDROCHLORIDE 500 MG/1
500 TABLET ORAL 2 TIMES DAILY WITH MEALS
Qty: 180 TAB | Refills: 1 | Status: SHIPPED | OUTPATIENT
Start: 2017-07-28 | End: 2018-01-26 | Stop reason: SDUPTHER

## 2017-08-15 ENCOUNTER — OFFICE VISIT (OUTPATIENT)
Dept: INTERNAL MEDICINE CLINIC | Age: 77
End: 2017-08-15

## 2017-08-15 VITALS
SYSTOLIC BLOOD PRESSURE: 124 MMHG | WEIGHT: 161.4 LBS | BODY MASS INDEX: 32.54 KG/M2 | OXYGEN SATURATION: 95 % | TEMPERATURE: 98.2 F | HEART RATE: 82 BPM | HEIGHT: 59 IN | DIASTOLIC BLOOD PRESSURE: 72 MMHG | RESPIRATION RATE: 18 BRPM

## 2017-08-15 DIAGNOSIS — I34.1 MITRAL VALVE PROLAPSE, NONRHEUMATIC: ICD-10-CM

## 2017-08-15 DIAGNOSIS — E11.9 CONTROLLED TYPE 2 DIABETES MELLITUS WITHOUT COMPLICATION, WITHOUT LONG-TERM CURRENT USE OF INSULIN (HCC): ICD-10-CM

## 2017-08-15 DIAGNOSIS — M15.9 OSTEOARTHRITIS INVOLVING MULTIPLE JOINTS ON BOTH SIDES OF BODY: ICD-10-CM

## 2017-08-15 DIAGNOSIS — E66.9 OBESITY (BMI 30-39.9): ICD-10-CM

## 2017-08-15 DIAGNOSIS — K58.8 OTHER IRRITABLE BOWEL SYNDROME: ICD-10-CM

## 2017-08-15 DIAGNOSIS — Z00.00 MEDICARE ANNUAL WELLNESS VISIT, SUBSEQUENT: Primary | ICD-10-CM

## 2017-08-15 NOTE — ACP (ADVANCE CARE PLANNING)
End of life planning discussed with patient. Patient states that they do have an advance directive and will provide a copy for our office at next appointment.

## 2017-08-15 NOTE — MR AVS SNAPSHOT
Visit Information Date & Time Provider Department Dept. Phone Encounter #  
 8/15/2017  2:00 PM Aziza George MD [facility] Internal Medicine [phone] [id number] Follow-up Instructions Return in about 3 months (around 11/15/2017) for Follow-up,Anxiety. Your Appointments 9/25/2017 11:40 AM  
Any with Rosibel Tran MD  
[id number] [facility] ([address]) Appt Note: YRLY CPAP FOLLOW UP  
 [address] [facility] [address] [address]  
[phone]  
  
   
 [address] [facility] [address] [address]  
  
    
 1/3/2018 10:50 AM  
ROUTINE CARE with Shahzad Diana MD  
[facility] [address] [place] [address]) Appt Note: f/u diabetes cp0.00  
 [address] [facility] [address] [facility] [facility] 7 [address] Upcoming Health Maintenance Date Due  
 EYE EXAM RETINAL OR DILATED Q1 4/21/2016 GLAUCOMA SCREENING Q2Y 4/21/2017 LIPID PANEL Q1 7/21/2017 INFLUENZA AGE 9 TO ADULT 8/1/2017 FOOT EXAM Q1 11/16/2017 HEMOGLOBIN A1C Q6M 12/5/2017 MICROALBUMIN Q1 6/5/2018 MEDICARE YEARLY EXAM 8/16/2018 DTaP/Tdap/Td series (2 - Td) 9/14/2025 Allergies as of 8/15/2017  Review Complete On: 8/15/2017 By: Aziza George MD  
  
 Severity Noted Reaction Type Reactions Januvia [Sitagliptin] Medium 07/23/2014   Side Effect Rash  
 B12 [Cyanocobalamin-cobamamide]  12/19/2011    Hives  
 w/injection Decongestant [Brompheniramine Maleate]  12/19/2011    Other (comments)  
 heart Current Immunizations  Reviewed on 1/26/2017 Name Date Influenza High Dose Vaccine PF 10/15/2016, 10/31/2015 Influenza Vaccine 10/15/2014, 10/16/2013 Influenza Vaccine Whole 10/8/2010 Pneumococcal Conjugate (PCV-13) 10/31/2015 Pneumococcal Polysaccharide (PPSV-23) 1/26/2017 Tdap 9/14/2015 Zoster 12/1/2009 Not reviewed this visit You Were Diagnosed With   
  
 Codes Comments Medicare annual wellness visit, subsequent    -  Primary ICD-10-CM: Z00.00 ICD-9-CM: V70.0 Controlled type 2 diabetes mellitus without complication, without long-term current use of insulin ([address]     ICD-10-CM: E11.9 ICD-9-CM: 250.00 Obesity (BMI 30-39. 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  
 32.45 kg/m 2 1.75 m 2 Preferred Pharmacy Pharmacy Name Phone Octavia Skinner [address], [address] [place] [address] Avenue [phone] Your Updated Medication List  
  
   
This list is accurate as of: 8/15/17  3:31 PM.  Always use your most recent med list.  
  
  
  
  
 Kofi Harris Take  by mouth daily. amLODIPine 2.5 mg tablet Commonly known as:  Shelbie Matar Take  by mouth daily. Pt unsure of dosage  
  
 aspirin delayed-release 81 mg tablet Take  by mouth daily. Every other day  
  
 biotin 2,500 mcg Tab Take 5,000 Units by mouth. finasteride 5 mg tablet Commonly known as:  PROSCAR Take 5 mg by mouth daily. fluticasone 50 mcg/actuation nasal spray Commonly known as:  Mariza Courser 2 Sprays by Both Nostrils route daily. glipiZIDE 5 mg tablet Commonly known as:  Liliya Marx Take 1 and half tabs with breakfast and 1 tab with dinner * glucose blood VI test strips strip Commonly known as:  ONETOUCH ULTRA TEST  
TEST BLOOD SUGAR Twice DAILY DX Code: E11.9  
  
 * glucose blood VI test strips strip Commonly known as:  ONETOUCH ULTRA TEST  
TEST BLOOD SUGAR Twice DAILY DX Code: E11.9 LORazepam 0.5 mg tablet Commonly known as:  ATIVAN Take 0.5 mg by mouth daily as needed. metFORMIN 500 mg tablet Commonly known as:  GLUCOPHAGE Take 1 Tab by mouth two (2) times daily (with meals). MULTIVITAMIN PO Take  by mouth. VITAMIN B-12 500 mcg tablet Generic drug:  cyanocobalamin Take 500 mcg by mouth daily. VITAMIN D3 1,000 unit Cap Generic drug:  cholecalciferol Take  by mouth. * Notice: This list has 2 medication(s) that are the same as other medications prescribed for you. Read the directions carefully, and ask your doctor or other care provider to review them with you. We Performed the Following HEMOGLOBIN A1C WITH EAG [26423 CPT(R)] LIPID PANEL [15019 CPT(R)] METABOLIC PANEL, COMPREHENSIVE [44146 CPT(R)] REFERRAL TO OPHTHALMOLOGY [REF57 Custom] Comments:  
 Reason for request: Diabetic eye Exam  
  
Follow-up Instructions Return in about 3 months (around 11/15/2017) for Follow-up,Anxiety. Referral Information Referral ID Referred By Referred To  
  
 [id number] [facility], [address], [address] Visits Status Start Date End Date 1 New Request 8/15/17 8/15/18 If your referral has a status of pending review or denied, additional information will be sent to support the outcome of this decision. Patient Instructions It was a pleasure to meet you! As discussed: 
 
I have ordered your age appropriate labs please complete them. You will need to fast 10-12hrs before your lab appointment. Complete labs two weeks before your next appointment. Anxiety As we discussed long term use of lorazepam has many side effectsThis medication has many short and long term side of effects including addiction and memory loss so should only be used for severe symptoms and as directed. Switch your dosage to as needed for now given your family circumstances. At your next visit we will discuss a taper for the medication. Well Visit, Over 72: Care Instructions Your Care Instructions Physical exams can help you stay healthy. Your doctor has checked your overall health and may have suggested ways to take good care of yourself. He or she also may have recommended tests. At home, you can help prevent illness with healthy eating, regular exercise, and other steps. Follow-up care is a key part of your treatment and safety. Be sure to make and go to all appointments, and call your doctor if you are having problems. It's also a good idea to know your test results and keep a list of the medicines you take. How can you care for yourself at home? · Reach and stay at a healthy weight. This will lower your risk for many problems, such as obesity, diabetes, heart disease, and high blood pressure. · Get at least 30 minutes of exercise on most days of the week. Walking is a good choice. You also may want to do other activities, such as running, swimming, cycling, or playing tennis or team sports. · Do not smoke. Smoking can make health problems worse. If you need help quitting, talk to your doctor about stop-smoking programs and medicines. These can increase your chances of quitting for good. · Protect your skin from too much sun. When you're outdoors from 10 a.m. to 4 p.m., stay in the shade or cover up with clothing and a hat with a wide brim. Wear sunglasses that block UV rays. Even when it's cloudy, put broad-spectrum sunscreen (SPF 30 or higher) on any exposed skin. · See a dentist one or two times a year for checkups and to have your teeth cleaned. · Wear a seat belt in the car. · Limit alcohol to 2 drinks a day for men and 1 drink a day for women. Too much alcohol can cause health problems. Follow your doctor's advice about when to have certain tests. These tests can spot problems early. For men and women · Cholesterol. Your doctor will tell you how often to have this done based on your overall health and other things that can increase your risk for heart attack and stroke. · Blood pressure. Have your blood pressure checked during a routine doctor visit. Your doctor will tell you how often to check your blood pressure based on your age, your blood pressure results, and other factors. · Diabetes. Ask your doctor whether you should have tests for diabetes. · Vision. Experts recommend that you have yearly exams for glaucoma and other age-related eye problems. · Hearing. Tell your doctor if you notice any change in your hearing. You can have tests to find out how well you hear. · Colon cancer tests. Keep having colon cancer tests as your doctor recommends. You can have one of several types of tests. · Heart attack and stroke risk. At least every 4 to 6 years, you should have your risk for heart attack and stroke assessed. Your doctor uses factors such as your age, blood pressure, cholesterol, and whether you smoke or have diabetes to show what your risk for a heart attack or stroke is over the next 10 years. · Osteoporosis. Talk to your doctor about whether you should have a bone density test to find out whether you have thinning bones. Also ask your doctor about whether you should take calcium and vitamin D supplements. For women · Pap test and pelvic exam. You may no longer need a Pap test. Talk with your doctor about whether to stop or continue to have Pap tests. · Breast exam and mammogram. Ask how often you should have a mammogram, which is an X-ray of your breasts.  A mammogram can spot breast cancer before it can be felt and when it is easiest to treat. · Thyroid disease. Talk to your doctor about whether to have your thyroid checked as part of a regular physical exam. Women have an increased chance of a thyroid problem. For men · Prostate exam. Talk to your doctor about whether you should have a blood test (called a PSA test) for prostate cancer. Experts disagree on whether men should have this test. Some experts recommend that you discuss the benefits and risks of the test with your doctor. · Abdominal aortic aneurysm. Ask your doctor whether you should have a test to check for an aneurysm. You may need a test if you ever smoked or if your parent, brother, sister, or child has had an aneurysm. When should you call for help? Watch closely for changes in your health, and be sure to contact your doctor if you have any problems or symptoms that concern you. Where can you learn more? Go to http://idania-skip.info/. Enter C908 in the search box to learn more about \"Well Visit, Over 65: Care Instructions. \" Current as of: July 19, 2016 Content Version: 11.3 © 0856-4984 [employer]. Care instructions adapted under license by [employer] (which disclaims liability or warranty for this information). If you have questions about a medical condition or this instruction, always ask your healthcare professional. [address] any warranty or liability for your use of this information. Introducing [facility]! Dear Ulysses Serra: Thank you for requesting a [employer] account. Our records indicate that you already have an active [employer] account. You can access your account anytime at [email or web address] Did you know that you can access your hospital and ER discharge instructions at any time in [employer] You can also review all of your test results from your hospital stay or ER visit. Additional Information If you have questions, please visit the Frequently Asked Questions section of the [email or web address] website at [email or web address] Remember, [employer] is NOT to be used for urgent needs. For medical emergencies, dial 911. Now available from your iPhone and Android! Please provide this summary of care documentation to your next provider. Your primary care clinician is listed as Beckie Looney. If you have any questions after today's visit, please call [phone].

## 2017-08-15 NOTE — ACP (ADVANCE CARE PLANNING)
Advance Care Planning (ACP) Provider Note - Comprehensive     Date of ACP Conversation: 08/15/17  Persons included in Conversation:  patient  Length of ACP Conversation in minutes:  16 minutes    Authorized Decision Maker (if patient is incapable of making informed decisions): This person is:  Berny Lauren (1st), Deborah Rivera (2nd daughter)           General ACP for ALL Patients with Decision Making Capacity:   Importance of advance care planning, including choosing a healthcare agent to communicate patient's healthcare decisions if patient lost the ability to make decisions, such as after a sudden illness or accident    Review of Existing Advance Directive:   Will provide     For Serious or Chronic Illness:  Understanding of medical condition      Interventions Provided:  Full Code

## 2017-08-15 NOTE — PROGRESS NOTES
HISTORY OF PRESENT ILLNESS  Sherry Chiu is a 76 y.o. female. HPI  Sherry Chiu is new to my practice. Prior care: her prior care was with Dr. Donna Ellis. Last seen there 2017. Records have been reviewed. Health Maintenance  Health Maintenance   Topic Date Due    EYE EXAM RETINAL OR DILATED Q1  04/21/2016    MEDICARE YEARLY EXAM  06/05/2016    GLAUCOMA SCREENING Q2Y  04/21/2017    LIPID PANEL Q1  07/21/2017    INFLUENZA AGE 9 TO ADULT  08/01/2017    FOOT EXAM Q1  11/16/2017    HEMOGLOBIN A1C Q6M  12/05/2017    MICROALBUMIN Q1  06/05/2018    DTaP/Tdap/Td series (2 - Td) 09/14/2025    OSTEOPOROSIS SCREENING (DEXA)  Completed    ZOSTER VACCINE AGE 60>  Completed    Pneumococcal 65+ Low/Medium Risk  Completed     Cardiovascular Review:  The patient has Mitral valve Prolapse and diabetes  Followed by Dr. Ellen Horton records reviewed   Diet and Lifestyle: follows a diabetic diet regularly, nonsmoker  Home BP Monitoring: is not measured at home. Pertinent ROS: taking medications as instructed, no medication side effects noted, no TIA's, no chest pain on exertion, no dyspnea on exertion, no swelling of ankles. Anxiety  Patient is seen for anxiety disorder. Current treatment includes Ativan and no other therapies. Ongoing symptoms include: none. Patient denies: suicidal ideation, homocidal ideation. Reported side effects from the treatment: none. SHx:  x 56yrs-  78 had  Heart attack. 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 This is a Subsequent Medicare Annual Wellness Visit providing Personalized Prevention Plan Services (PPPS) (Performed 12 months after initial AWV and PPPS )    I have reviewed the patient's medical history in detail and updated the computerized patient record.      History     Past Medical History:   Diagnosis Date    Diabetes ([facility] [address]     adult-onset    IBS (irritable bowel syndrome)     Irregular heart beat     (PVCs)    MVP (mitral valve prolapse)     h/o (neg ECHO-3/06)      Past Surgical History:   Procedure Laterality Date    HX HEENT      tonsilectomy    HX HYSTERECTOMY      partial Hysterectomy    HX ORTHOPAEDIC      left knee-arthroscopic    HX ORTHOPAEDIC      carpal tunnel     Current Outpatient Prescriptions   Medication Sig Dispense Refill    metFORMIN (GLUCOPHAGE) 500 mg tablet Take 1 Tab by mouth two (2) times daily (with meals). 180 Tab 1    glucose blood VI test strips (ONETOUCH ULTRA TEST) strip TEST BLOOD SUGAR Twice DAILY DX Code: E11.9 100 Strip 0    glucose blood VI test strips (ONETOUCH ULTRA TEST) strip TEST BLOOD SUGAR Twice DAILY DX Code: E11.9 200 Strip 3    fluticasone (FLONASE) 50 mcg/actuation nasal spray 2 Sprays by Both Nostrils route daily. 1 Bottle 0    glipiZIDE (GLUCOTROL) 5 mg tablet Take 1 and half tabs with breakfast and 1 tab with dinner 225 Tab 1    amLODIPine (NORVASC) 2.5 mg tablet Take  by mouth daily. Pt unsure of dosage      cyanocobalamin (VITAMIN B-12) 500 mcg tablet Take 500 mcg by mouth daily.  finasteride (PROSCAR) 5 mg tablet Take 5 mg by mouth daily.  biotin 2,500 mcg tab Take 5,000 Units by mouth.  Cholecalciferol, Vitamin D3, (VITAMIN D3) 1,000 unit cap Take  by mouth.  MULTIVITAMIN PO Take  by mouth.  NAPROXEN SODIUM (ALEVE PO) Take  by mouth daily.  aspirin delayed-release 81 mg tablet Take  by mouth daily. Every other day      LORazepam (ATIVAN) 0.5 mg tablet Take 0.5 mg by mouth daily as needed.        Allergies   Allergen Reactions    Januvia [Sitagliptin] Rash    B12 [Cyanocobalamin-Cobamamide] Hives     w/injection    Decongestant [Brompheniramine Maleate] Other (comments)     heart     Family History   Problem Relation Age of Onset    Heart Failure Father      CHF    Emphysema Father      (smoker)    COPD Father     Arthritis-osteo Mother     Arrhythmia Mother     Heart Attack Mother     Heart Attack Maternal Grandmother Social History   Substance Use Topics    Smoking status: Never Smoker    Smokeless tobacco: Never Used    Alcohol use 0.0 oz/week     0 Standard drinks or equivalent per week      Comment: occassionally     Patient Active Problem List   Diagnosis Code    Diabetes mellitus type 2, controlled ([facility] [address] E11.9    Urge incontinence of urine N39.41    Atrial premature depolarization I49.1    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 9) E66.9    Chronic prescription benzodiazepine use Z79.899    Mitral valve prolapse, nonrheumatic I34.1    Osteoarthritis involving multiple joints on both sides of body M15.9       Depression Risk Factor Screening:     PHQ over the last two weeks 8/15/2017   PHQ Not Done -   Little interest or pleasure in doing things Not at all   Feeling down, depressed or hopeless Not at all   Total Score PHQ 2 0     Alcohol Risk Factor Screening: On any occasion during the past 3 months, have you had more than 3 drinks containing alcohol? Yes    Do you average more than 7 drinks per week? No        Functional Ability and Level of Safety:     Hearing Loss   moderate    Activities of Daily Living   Self-care. Requires assistance with: no ADLs    Fall Risk   Fall Risk Assessment, last 12 mths 8/15/2017   Able to walk? Yes   Fall in past 12 months? No   Fall with injury? -   Number of falls in past 12 months -   Fall Risk Score -     Abuse Screen   Patient is not abused        Review of Systems   Musculoskeletal: Positive for joint pain (followed by Halley Calles ). Physical Exam   Constitutional: She is oriented to person, place, and time. She appears well-developed and well-nourished.    HENT:   Right Ear: External ear normal.   Left Ear: External ear normal.   Mouth/Throat: Oropharynx is clear and moist. No oropharyngeal exudate. Eyes: Conjunctivae are normal. No scleral icterus. Neck: Normal range of motion. Neck supple. No thyromegaly present. Cardiovascular: Normal rate, regular rhythm and normal heart sounds. Exam reveals no gallop and no friction rub. No murmur heard. Pulmonary/Chest: Effort normal and breath sounds normal. No respiratory distress. She has no wheezes. She has no rales. She exhibits no tenderness. Abdominal: Soft. Bowel sounds are normal. She exhibits no distension. There is no tenderness. There is no rebound and no guarding. Genitourinary: Rectal exam shows guaiac negative stool. Genitourinary Comments: Deferred for gyn per pt request   Musculoskeletal: Normal range of motion. She exhibits no edema or tenderness. Neurological: She is alert and oriented to person, place, and time. Evaluation of Cognitive Function:  Mood/affect:  happy  Appearance: age appropriate and well dressed  Family member/caregiver input: n/a       Patient Care Team:  Barbra Goetz MD as PCP - General (Internal Medicine)  Kasia Ly (Cardiology)  Milan Gr MD (Ophthalmology)  Merlinda Felty, MD (Obstetrics & Gynecology)  Bosie Harada, MD (Orthopedic Surgery)  Tracee Hernandez MD (Cardiology)  Grace Martniez MD as Consulting Provider (Endocrinology)  Sherry Slater NP (Family Practice)  ASSESSMENT and PLAN      Advice/Referrals/Counseling   Education and counseling provided:  Are appropriate based on today's review and evaluation  End-of-Life planning (with patient's consent)      Assessment/Plan   Diagnoses and all orders for this visit:    1. Medicare annual wellness visit, subsequent- Health Maintenance reviewed . 2. Controlled type 2 diabetes mellitus without complication, without long-term current use of insulin (HCC)  -     LIPID PANEL  -     REFERRAL TO OPHTHALMOLOGY  -     HEMOGLOBIN A1C WITH EAG  -     METABOLIC PANEL, COMPREHENSIVE    3. Obesity (BMI 30-39. 9)- I have reviewed/discussed the above normal BMI with the patient. I have recommended the following interventions: dietary management education, guidance, and counseling . .         4. Other irritable bowel syndrome    5. Mitral valve prolapse, nonrheumatic- per cardiology    6. Osteoarthritis involving multiple joints on both sides of body    7. Anxiety- Anxiety  As we discussed long term use of lorazepam has many side effectsThis medication has many short and long term side of effects including addiction and memory loss so should only be used for severe symptoms and as directed. Switch your dosage to as needed for now given your family circumstances. At your next visit we will discuss a taper for the medication. Will fill 30 day rx with 2 refills when next rx is due. Currently managed by Dr. Gretchen Arnett. Follow-up Disposition:  Return in about 3 months (around 11/15/2017) for Follow-up,Anxiety. Medication risks/benefits/costs/interactions/alternatives discussed with patient. Suad Dunlap  was given an after visit summary which includes diagnoses, current medications, & vitals. she expressed understanding with the diagnosis and plan.

## 2017-08-15 NOTE — PROGRESS NOTES
Chief Complaint   Patient presents with   [address] [place] [address] Care     1. Have you been to the ER, urgent care clinic since your last visit? Hospitalized since your last visit? No    2. Have you seen or consulted any other health care providers outside of the [address] [place] [address] since your last visit? Include any pap smears or colon screening. No      Care giver for , 5/1/2017-heart attack    Dr. Alejandra Barnes-Rheumatologist, was scheduled to have wrist surgery.  Had to cancel

## 2017-08-15 NOTE — PATIENT INSTRUCTIONS
It was a pleasure to meet you! As discussed:    I have ordered your age appropriate labs please complete them. You will need to fast 10-12hrs before your lab appointment. Complete labs two weeks before your next appointment. Anxiety  As we discussed long term use of lorazepam has many side effectsThis medication has many short and long term side of effects including addiction and memory loss so should only be used for severe symptoms and as directed. Switch your dosage to as needed for now given your family circumstances. At your next visit we will discuss a taper for the medication. Well Visit, Over 72: Care Instructions  Your Care Instructions  Physical exams can help you stay healthy. Your doctor has checked your overall health and may have suggested ways to take good care of yourself. He or she also may have recommended tests. At home, you can help prevent illness with healthy eating, regular exercise, and other steps. Follow-up care is a key part of your treatment and safety. Be sure to make and go to all appointments, and call your doctor if you are having problems. It's also a good idea to know your test results and keep a list of the medicines you take. How can you care for yourself at home? · Reach and stay at a healthy weight. This will lower your risk for many problems, such as obesity, diabetes, heart disease, and high blood pressure. · Get at least 30 minutes of exercise on most days of the week. Walking is a good choice. You also may want to do other activities, such as running, swimming, cycling, or playing tennis or team sports. · Do not smoke. Smoking can make health problems worse. If you need help quitting, talk to your doctor about stop-smoking programs and medicines. These can increase your chances of quitting for good. · Protect your skin from too much sun.  When you're outdoors from 10 a.m. to 4 p.m., stay in the shade or cover up with clothing and a hat with a wide brim. Wear sunglasses that block UV rays. Even when it's cloudy, put broad-spectrum sunscreen (SPF 30 or higher) on any exposed skin. · See a dentist one or two times a year for checkups and to have your teeth cleaned. · Wear a seat belt in the car. · Limit alcohol to 2 drinks a day for men and 1 drink a day for women. Too much alcohol can cause health problems. Follow your doctor's advice about when to have certain tests. These tests can spot problems early. For men and women  · Cholesterol. Your doctor will tell you how often to have this done based on your overall health and other things that can increase your risk for heart attack and stroke. · Blood pressure. Have your blood pressure checked during a routine doctor visit. Your doctor will tell you how often to check your blood pressure based on your age, your blood pressure results, and other factors. · Diabetes. Ask your doctor whether you should have tests for diabetes. · Vision. Experts recommend that you have yearly exams for glaucoma and other age-related eye problems. · Hearing. Tell your doctor if you notice any change in your hearing. You can have tests to find out how well you hear. · Colon cancer tests. Keep having colon cancer tests as your doctor recommends. You can have one of several types of tests. · Heart attack and stroke risk. At least every 4 to 6 years, you should have your risk for heart attack and stroke assessed. Your doctor uses factors such as your age, blood pressure, cholesterol, and whether you smoke or have diabetes to show what your risk for a heart attack or stroke is over the next 10 years. · Osteoporosis. Talk to your doctor about whether you should have a bone density test to find out whether you have thinning bones. Also ask your doctor about whether you should take calcium and vitamin D supplements.   For women  · Pap test and pelvic exam. You may no longer need a Pap test. Talk with your doctor about whether to stop or continue to have Pap tests. · Breast exam and mammogram. Ask how often you should have a mammogram, which is an X-ray of your breasts. A mammogram can spot breast cancer before it can be felt and when it is easiest to treat. · Thyroid disease. Talk to your doctor about whether to have your thyroid checked as part of a regular physical exam. Women have an increased chance of a thyroid problem. For men  · Prostate exam. Talk to your doctor about whether you should have a blood test (called a PSA test) for prostate cancer. Experts disagree on whether men should have this test. Some experts recommend that you discuss the benefits and risks of the test with your doctor. · Abdominal aortic aneurysm. Ask your doctor whether you should have a test to check for an aneurysm. You may need a test if you ever smoked or if your parent, brother, sister, or child has had an aneurysm. When should you call for help? Watch closely for changes in your health, and be sure to contact your doctor if you have any problems or symptoms that concern you. Where can you learn more? Go to http://idania-skip.info/. Enter Y113 in the search box to learn more about \"Well Visit, Over 65: Care Instructions. \"  Current as of: July 19, 2016  Content Version: 11.3  © 8941-7867 [employer]. Care instructions adapted under license by [employer] (which disclaims liability or warranty for this information). If you have questions about a medical condition or this instruction, always ask your healthcare professional. [place] [address] any warranty or liability for your use of this information.

## 2017-09-25 ENCOUNTER — OFFICE VISIT (OUTPATIENT)
Dept: SLEEP MEDICINE | Age: 77
End: 2017-09-25

## 2017-09-25 VITALS
DIASTOLIC BLOOD PRESSURE: 76 MMHG | BODY MASS INDEX: 32.84 KG/M2 | HEART RATE: 79 BPM | SYSTOLIC BLOOD PRESSURE: 137 MMHG | WEIGHT: 162.9 LBS | HEIGHT: 59 IN | OXYGEN SATURATION: 97 %

## 2017-09-25 DIAGNOSIS — E11.9 CONTROLLED TYPE 2 DIABETES MELLITUS WITHOUT COMPLICATION, WITHOUT LONG-TERM CURRENT USE OF INSULIN (HCC): ICD-10-CM

## 2017-09-25 DIAGNOSIS — G47.33 OBSTRUCTIVE SLEEP APNEA (ADULT) (PEDIATRIC): Primary | ICD-10-CM

## 2017-09-25 NOTE — PATIENT INSTRUCTIONS
[facility] [place]. [place], [address]  Tel.  [phone]  Fax. 100 [facility] 60  [place], [address]  Tel.  [phone]  Fax. [phone] [address]  Tel.  [phone]  Fax. [phone]     PROPER SLEEP HYGIENE    What to avoid  · Do not have drinks with caffeine, such as coffee or black tea, for 8 hours before bed. · Do not smoke or use other types of tobacco near bedtime. Nicotine is a stimulant and can keep you awake. · Avoid drinking alcohol late in the evening, because it can cause you to wake in the middle of the night. · Do not eat a big meal close to bedtime. If you are hungry, eat a light snack. · Do not drink a lot of water close to bedtime, because the need to urinate may wake you up during the night. · Do not read or watch TV in bed. Use the bed only for sleeping and sexual activity. What to try  · Go to bed at the same time every night, and wake up at the same time every morning. Do not take naps during the day. · Keep your bedroom quiet, dark, and cool. · Get regular exercise, but not within 3 to 4 hours of your bedtime. .  · Sleep on a comfortable pillow and mattress. · If watching the clock makes you anxious, turn it facing away from you so you cannot see the time. · If you worry when you lie down, start a worry book. Well before bedtime, write down your worries, and then set the book and your concerns aside. · Try meditation or other relaxation techniques before you go to bed. · If you cannot fall asleep, get up and go to another room until you feel sleepy. Do something relaxing. Repeat your bedtime routine before you go to bed again. · Make your house quiet and calm about an hour before bedtime. Turn down the lights, turn off the TV, log off the computer, and turn down the volume on music. This can help you relax after a busy day.     Drowsy Driving  The [address] [place] [address] Road Traffic Safety Administration cites drowsiness as a causing factor in more than 343,846 police reported crashes annually, resulting in 76,000 injuries and 1,500 deaths. Other surveys suggest 55% of people polled have driven while drowsy in the past year, 23% had fallen asleep but not crashed, 3% crashed, and 2% had and accident due to drowsy driving. Who is at risk? Young Drivers: One study of drowsy driving accidents states that 55% of the drivers were under 25 years. Of those, 75% were male. Shift Workers and Travelers: People who work overnight or travel across time zones frequently are at higher risk of experiencing Circadian Rhythm Disorders. They are trying to work and function when their body is programed to sleep. Sleep Deprived: Lack of sleep has a serious impact on your ability to pay attention or focus on a task. Consistently getting less than the average of 8 hours your body needs creates partial or cumulative sleep deprivation. Untreated Sleep Disorders: Sleep Apnea, Narcolepsy, R.L.S., and other sleep disorders (untreated) prevent a person from getting enough restful sleep. This leads to excessive daytime sleepiness and increases the risk for drowsy driving accidents by up to 7 times. Medications / Alcohol: Even over the counter medications can cause drowsiness. Medications that impair a drivers attention should have a warning label. Alcohol naturally makes you sleepy and on its own can cause accidents. Combined with excessive drowsiness its effects are amplified. Signs of Drowsy Driving:   * You don't remember driving the last few miles   * You may drift out of your lucia   * You are unable to focus and your thoughts wander   * You may yawn more often than normal   * You have difficulty keeping your eyes open / nodding off   * Missing traffic signs, speeding, or tailgating  Prevention-   Good sleep hygiene, lifestyle and behavioral choices have the most impact on drowsy driving.  There is no substitute for sleep and the average person requires 8 hours nightly. If you find yourself driving drowsy, stop and sleep. Consider the sleep hygiene tips provided during your visit as well. Medication Refill Policy: Refills for all medications require 1 week advance notice. Please have your pharmacy fax a refill request. We are unable to fax, or call in \"controled substance\" medications and you will need to pick these prescriptions up from our office. [email or web address] Activation    Thank you for requesting access to [employer]. Please follow the instructions below to securely access and download your online medical record. [employer] allows you to send messages to your doctor, view your test results, renew your prescriptions, schedule appointments, and more. How Do I Sign Up? 1. In your internet browser, go to [email or web address]. 2. Click on the First Time User? Click Here link in the Sign In box. You will see the New Member Sign Up page. 3. Enter your [employer] Access Code exactly as it appears below. You will not need to use this code after youve completed the sign-up process. If you do not sign up before the expiration date, you must request a new code. [employer] Access Code: Activation code not generated  Current [employer] Status: Active (This is the date your [employer] access code will )    4. Enter the last four digits of your Social Security Number (xxxx) and Date of Birth (mm/dd/yyyy) as indicated and click Submit. You will be taken to the next sign-up page. 5. Create a [employer] ID. This will be your [employer] login ID and cannot be changed, so think of one that is secure and easy to remember. 6. Create a [employer] password. You can change your password at any time. 7. Enter your Password Reset Question and Answer. This can be used at a later time if you forget your password. 8. Enter your e-mail address. You will receive e-mail notification when new information is available in [address]   9. Click Sign Up. You can now view and download portions of your medical record. 10. Click the Download Summary menu link to download a portable copy of your medical information. Additional Information    If you have questions, please call [phone]. Remember, [employer] is NOT to be used for urgent needs. For medical emergencies, dial 911.

## 2017-09-25 NOTE — PROGRESS NOTES
[address] [place]. [place], [address]  Tel.  [phone]  Fax. 100 [facility] 60  [place], [address]  Tel.  [phone]  Fax. [phone] [address] [facility] [address]   Tel.  [phone]  Fax. [phone]     S>Della Anderson is a 76 y.o. female seen for a positive airway pressure follow-up. She reports no problems using the device. The following problems are identified:    Drowsiness no Problems exhaling no   Snoring no Forget to put on no   Mask Comfortable yes Can't fall asleep no   Dry Mouth Yes despite using humidifier. Water level not going down much Mask falls off no   Air Leaking no Frequent awakenings no     Download reviewed. She admits that her sleep has improved. Therapy Apnea Index averaged over PAP use: 1 /hr which reflects significantly improved sleep breathing condition. Allergies   Allergen Reactions    Januvia [Sitagliptin] Rash    B12 [Cyanocobalamin-Cobamamide] Hives     w/injection    Decongestant [Brompheniramine Maleate] Other (comments)     heart       She has a current medication list which includes the following prescription(s): metformin, glucose blood vi test strips, glucose blood vi test strips, fluticasone, glipizide, amlodipine, cyanocobalamin, finasteride, biotin, cholecalciferol, multivitamin, naproxen sodium, aspirin delayed-release, and lorazepam..      She  has a past medical history of Diabetes ([address] IBS (irritable bowel syndrome); Irregular heart beat; and MVP (mitral valve prolapse). [place] Sleepiness Score: 10   and Modified F.O.S.Q. Score Total / 2: 17.5   which reflect improved sleep quality over therapy time.     O>    Visit Vitals    /76    Pulse 79    Ht 4' 11.13\" (1.502 m)    Wt 162 lb 14.4 oz (73.9 kg)    SpO2 97%    BMI 32.76 kg/m2           General:   Alert, oriented, not in distress   Neck:   No JVD    Chest/Lungs:  symetrical lung expansion , no accessory muscle use    Extremities:  no obvious rashes , negative edema    Neuro:  No focal deficits ; No obvious tremor    Psych:  Normal affect ,  Normal countenance ;         A>    ICD-10-CM ICD-9-CM    1. Obstructive sleep apnea (adult) (pediatric) G47.33 327.23 AMB SUPPLY ORDER   2. Controlled type 2 diabetes mellitus without complication, without long-term current use of insulin (HCC) E11.9 250.00      AHI = 16(2015). On CPAP :  5-10 cmH2O. Compliant:      yes    Therapeutic Response:  Positive    P>      * Follow-up Disposition:  Return in about 1 year (around 9/25/2018). she will continue on her current pressure settings. Fix or replace machine provide loaner. I have reviewed medicare requirements regarding PAP usage    * She was asked to contact our office for any problems regarding PAP therapy. * Counseling was provided regarding the importance of regular PAP use and on proper sleep hygiene and safe driving. * Re-enforced proper and regular cleaning for the device. 2. Type II diabetes - she continues on her current regimen. I have reviewed the relationship between sleep disordered breathing as it relates to diabetes.     Eufemia Bolanos MD  Diplomate in Sleep Medicine  [facility]

## 2017-11-16 ENCOUNTER — HOSPITAL ENCOUNTER (OUTPATIENT)
Dept: LAB | Age: 77
Discharge: HOME | End: 2017-11-16
Payer: MEDICARE

## 2017-11-16 PROCEDURE — 80053 COMPREHEN METABOLIC PANEL: CPT

## 2017-11-16 PROCEDURE — 36415 COLL VENOUS BLD VENIPUNCTURE: CPT

## 2017-11-16 PROCEDURE — 83036 HEMOGLOBIN GLYCOSYLATED A1C: CPT

## 2017-11-16 PROCEDURE — 80061 LIPID PANEL: CPT

## 2017-11-17 LAB
ALBUMIN SERPL-MCNC: 4.6 G/DL (ref 3.5–4.8)
ALBUMIN/GLOB SERPL: 2.2 {RATIO} (ref 1.2–2.2)
ALP SERPL-CCNC: 85 IU/L (ref 39–117)
ALT SERPL-CCNC: 16 IU/L (ref 0–32)
AST SERPL-CCNC: 22 IU/L (ref 0–40)
BILIRUB SERPL-MCNC: 0.5 MG/DL (ref 0–1.2)
BUN SERPL-MCNC: 14 MG/DL (ref 8–27)
BUN/CREAT SERPL: 20 (ref 12–28)
CALCIUM SERPL-MCNC: 9.5 MG/DL (ref 8.7–10.3)
CHLORIDE SERPL-SCNC: 101 MMOL/L (ref 96–106)
CHOLEST SERPL-MCNC: 171 MG/DL (ref 100–199)
CO2 SERPL-SCNC: 25 MMOL/L (ref 18–29)
CREAT SERPL-MCNC: 0.69 MG/DL (ref 0.57–1)
EST. AVERAGE GLUCOSE BLD GHB EST-MCNC: 148 MG/DL
GFR SERPLBLD CREATININE-BSD FMLA CKD-EPI: 83 ML/MIN/1.73
GFR SERPLBLD CREATININE-BSD FMLA CKD-EPI: 96 ML/MIN/1.73
GLOBULIN SER CALC-MCNC: 2.1 G/DL (ref 1.5–4.5)
GLUCOSE SERPL-MCNC: 158 MG/DL (ref 65–99)
HBA1C MFR BLD: 6.8 % (ref 4.8–5.6)
HDLC SERPL-MCNC: 62 MG/DL
LDLC SERPL CALC-MCNC: 89 MG/DL (ref 0–99)
POTASSIUM SERPL-SCNC: 4.2 MMOL/L (ref 3.5–5.2)
PROT SERPL-MCNC: 6.7 G/DL (ref 6–8.5)
SODIUM SERPL-SCNC: 142 MMOL/L (ref 134–144)
TRIGL SERPL-MCNC: 101 MG/DL (ref 0–149)
VLDLC SERPL CALC-MCNC: 20 MG/DL (ref 5–40)

## 2017-11-29 ENCOUNTER — OFFICE VISIT (OUTPATIENT)
Dept: INTERNAL MEDICINE CLINIC | Age: 77
End: 2017-11-29

## 2017-11-29 VITALS
RESPIRATION RATE: 18 BRPM | WEIGHT: 163.6 LBS | HEIGHT: 59 IN | BODY MASS INDEX: 32.98 KG/M2 | TEMPERATURE: 98.4 F | DIASTOLIC BLOOD PRESSURE: 64 MMHG | OXYGEN SATURATION: 95 % | HEART RATE: 79 BPM | SYSTOLIC BLOOD PRESSURE: 140 MMHG

## 2017-11-29 DIAGNOSIS — E11.9 CONTROLLED TYPE 2 DIABETES MELLITUS WITHOUT COMPLICATION, WITHOUT LONG-TERM CURRENT USE OF INSULIN (HCC): ICD-10-CM

## 2017-11-29 DIAGNOSIS — F06.4 ANXIETY DISORDER DUE TO GENERAL MEDICAL CONDITION: ICD-10-CM

## 2017-11-29 DIAGNOSIS — M62.838 NECK MUSCLE SPASM: Primary | ICD-10-CM

## 2017-11-29 RX ORDER — DICLOFENAC SODIUM 10 MG/G
GEL TOPICAL 4 TIMES DAILY
COMMUNITY
End: 2020-10-14

## 2017-11-29 NOTE — PATIENT INSTRUCTIONS
It was a pleasure to see you! As discussed:    Anxiety  Decrease Ativan to twice a week as needed. Let me know if you have any worsened anxiety or side effects with this trial.     Neck Tightness   Try finding a therapist using the list provided and www. psychologytoday.com. For relaxation techniques and breathing exercises. [place] [id number] [facility] [place] [address]  Jessica Fu  [phone]         Neck Spasm: Care Instructions  Your Care Instructions  A neck spasm is sudden tightness and pain in your neck muscles. A spasm may be caused by some activities or repeated movements. For example, you may be more likely to have a neck spasm if you slouch, paint a ceiling, work at a computer, or sleep with your neck twisted. But the cause isn't always clear. Home treatment includes using heat or ice, taking over-the-counter (OTC) pain medicines, and avoiding activities that may lead to neck pain. Gentle stretching, or treatments such as massage or manipulation, may also help ease a neck spasm. For a neck spasm that doesn't get better with home care, your doctor may prescribe medicine. He or she may also suggest exercise or physical therapy to help strengthen or relax your neck muscles. Follow-up care is a key part of your treatment and safety. Be sure to make and go to all appointments, and call your doctor if you are having problems. It's also a good idea to know your test results and keep a list of the medicines you take. How can you care for yourself at home? · To relieve pain, use heat or ice (whichever feels better) on the affected area. ¨ Put a warm water bottle, a heating pad set on low, or a warm cloth on your neck. Put a thin cloth between the heating pad and your skin. Do not go to sleep with a heating pad on your skin. ¨ Try ice or a cold pack on the area for 10 to 20 minutes at a time. Put a thin cloth between the ice and your skin.   · Ask your doctor if you can take acetaminophen (such as Tylenol) or nonsteroidal anti-inflammatory drugs, such as ibuprofen or naproxen. Your doctor can prescribe stronger medicines if needed. Be safe with medicines. Read and follow all instructions on the label. · Stretch your muscles every day, especially before and after exercise and at bedtime. Regular stretching can help relax your muscles. · Try to find a pillow and a position in bed that help improve your night's rest.  · Try to stay active. It's best to start activity slowly. If an exercise makes your painworse, stop doing it. When should you call for help? Call 911 anytime you think you may need emergency care. For example, call if:  ? · You are unable to move an arm or a leg at all. ?Call your doctor now or seek immediate medical care if:  ? · You have new or worse symptoms in your arms, legs, belly, or buttocks. Symptoms may include:  ¨ Numbness or tingling. ¨ Weakness. ¨ Pain. ? · You lose bladder or bowel control. ? Watch closely for changes in your health, and be sure to contact your doctor if:  ? · You do not get better as expected. Where can you learn more? Go to http://idania-skip.info/. Enter X210 in the search box to learn more about \"Neck Spasm: Care Instructions. \"  Current as of: March 21, 2017  Content Version: 11.4  © 2579-6914 [employer]. Care instructions adapted under license by [employer] (which disclaims liability or warranty for this information). If you have questions about a medical condition or this instruction, always ask your healthcare professional. [place] [address] any warranty or liability for your use of this information.

## 2017-11-29 NOTE — PROGRESS NOTES
HISTORY OF PRESENT ILLNESS  Fern Weaver is a 76 y.o. female. HPI  Cardiovascular Review:  The patient has Mitral valve Prolapse and diabetes  Followed by Dr. Mina Velez Cardiology records reviewed   Diet and Lifestyle: follows a diabetic diet regularly, nonsmoker  Home BP Monitoring: is not measured at home. Pertinent ROS: taking medications as instructed, no medication side effects noted, no TIA's, no chest pain on exertion, no dyspnea on exertion, no swelling of ankles.      Anxiety  Patient is seen for anxiety disorder. Current treatment includes Ativan and no other therapies. She has tried every other day. Luis Felipe Van has written her final prescription. Ongoing symptoms include: none. Patient denies: suicidal ideation, homocidal ideation. Reported side effects from the treatment: none.       SHx: Sings soprano in 3 choirs. Review of Systems   Constitutional: Negative for diaphoresis, fever and weight loss. Eyes: Negative for blurred vision and pain. Respiratory: Negative for shortness of breath. Cardiovascular: Negative for chest pain, orthopnea and leg swelling. Neurological: Negative for focal weakness and headaches. Psychiatric/Behavioral: Negative for depression. The patient is nervous/anxious. The patient does not have insomnia.       Patient Active Problem List    Diagnosis Date Noted    Osteoarthritis involving multiple joints on both sides of body 11/16/2016    Mitral valve prolapse, nonrheumatic 07/20/2016    Obesity (BMI 30-39.9) 04/20/2016    Chronic prescription benzodiazepine use 04/20/2016    FELIX on CPAP 07/10/2015    IBS (irritable bowel syndrome) 11/05/2014    Vitamin D deficiency 04/24/2014    Urge incontinence of urine 01/16/2014    Atrial premature depolarization 01/16/2014    Anxiety disorder due to general medical condition 01/16/2014    Post-nasal drip 01/16/2014    Fecal incontinence 01/16/2014    Diabetes mellitus type 2, controlled ([facility] 12/20/2011       Current Outpatient Prescriptions   Medication Sig Dispense Refill    diclofenac (VOLTAREN) 1 % gel Apply  to affected area four (4) times daily.  glucose blood VI test strips (ONETOUCH ULTRA TEST) strip TEST BLOOD SUGAR Twice DAILY DX Code: E11.9 200 Strip 3    metFORMIN (GLUCOPHAGE) 500 mg tablet Take 1 Tab by mouth two (2) times daily (with meals). 180 Tab 1    glucose blood VI test strips (ONETOUCH ULTRA TEST) strip TEST BLOOD SUGAR Twice DAILY DX Code: E11.9 100 Strip 0    fluticasone (FLONASE) 50 mcg/actuation nasal spray 2 Sprays by Both Nostrils route daily. 1 Bottle 0    glipiZIDE (GLUCOTROL) 5 mg tablet Take 1 and half tabs with breakfast and 1 tab with dinner 225 Tab 1    amLODIPine (NORVASC) 2.5 mg tablet Take 2.5 mg by mouth daily. Pt unsure of dosage      cyanocobalamin (VITAMIN B-12) 500 mcg tablet Take 500 mcg by mouth two (2) times a day.  finasteride (PROSCAR) 5 mg tablet Take 5 mg by mouth daily.  biotin 2,500 mcg tab Take 2,500 Units by mouth.  Cholecalciferol, Vitamin D3, (VITAMIN D3) 1,000 unit cap Take 1,000 Units by mouth.  MULTIVITAMIN PO Take 1 Tab by mouth daily.  NAPROXEN SODIUM (ALEVE PO) Take 220 mg by mouth daily as needed.  aspirin delayed-release 81 mg tablet Take  by mouth daily. Every other day      LORazepam (ATIVAN) 0.5 mg tablet Take 0.5 mg by mouth daily as needed. Allergies   Allergen Reactions    Januvia [Sitagliptin] Rash    B12 [Cyanocobalamin-Cobamamide] Hives     w/injection    Decongestant [Brompheniramine Maleate] Other (comments)     heart      Visit Vitals    /64 (BP 1 Location: Left arm, BP Patient Position: Sitting)    Pulse 79    Temp 98.4 °F (36.9 °C) (Oral)    Resp 18    Ht 4' 11.13\" (1.502 m)    Wt 163 lb 9.6 oz (74.2 kg)    SpO2 95%    BMI 32.9 kg/m2       Physical Exam   Constitutional: She is oriented to person, place, and time. She appears well-developed. No distress.    Eyes: Conjunctivae are normal.   Neck: Neck supple. Cardiovascular: Normal rate, regular rhythm and normal heart sounds. Pulmonary/Chest: Effort normal and breath sounds normal. No respiratory distress. She has no wheezes. She has no rales. She exhibits no tenderness. Neurological: She is alert and oriented to person, place, and time. Skin: Skin is warm. Psychiatric: She has a normal mood and affect. ASSESSMENT and PLAN  Diagnoses and all orders for this visit:    1. Neck muscle spasm-reports paroxysmal throat tightening associated with high stress or high excitement. We did we discussed relaxation exercises and she was advised to see a therapist for this see after visit summary for additional recommendations. If the mindfulness and breathing exercises does not work or she develops any of the red flag symptoms will refer her to ENT for additional evaluation. 2. Controlled type 2 diabetes mellitus without complication, without long-term current use of insulin ([facility])-endocrinology management. 3. Anxiety disorder due to general medical condition-As we discussed long term use of lorazepam has many side effectsThis medication has many short and long term side of effects including addiction and memory loss so should only be used for severe symptoms and as directed. Switch your dosage t to twice a week . I will assume prescription of her Lorazepam after current prescription by Dr. Babatunde Jarvis is complete. Follow-up Disposition:  Return in about 4 months (around 3/29/2018) for Follow-up. Medication risks/benefits/costs/interactions/alternatives discussed with patient. Emmie Butler  was given an after visit summary which includes diagnoses, current medications, & vitals. she expressed understanding with the diagnosis and plan.       . Anxiety- Anxiety

## 2017-11-29 NOTE — MR AVS SNAPSHOT
Visit Information Date & Time Provider Department Dept. Phone Encounter #  
 11/29/2017 11:30 AM Jennett Lanes, MD [address] [address] Internal Medicine [phone] [id number] Follow-up Instructions Return in about 4 months (around 3/29/2018) for Follow-up. Follow-up and Disposition History Your Appointments 1/3/2018 10:50 AM  
ROUTINE CARE with MD Silverio Molina [facility] Endocr[facility] Appt Note: f/u diabetes cp0.00  
 [address] [place] [address]  
[phone]  
  
   
 [address]  [address] [place] [address] Dr  
  
    
 10/1/2018 11:00 AM  
Any with Prakash Josue MD  
[address] [place] [address] ([facility]) Appt Note: yearly PAP follow up [address] [address] [facility]  
  
   
 [address] [facility] [address] [place] [id number] Upcoming Health Maintenance Date Due  
 FOOT EXAM Q1 5/8/2018* EYE EXAM RETINAL OR DILATED Q1 7/6/2018* HEMOGLOBIN A1C Q6M 5/16/2018 MICROALBUMIN Q1 6/5/2018 MEDICARE YEARLY EXAM 8/16/2018 LIPID PANEL Q1 11/16/2018 GLAUCOMA SCREENING Q2Y 8/8/2019 DTaP/Tdap/Td series (2 - Td) 9/14/2025 *Topic was postponed. The date shown is not the original due date. Allergies as of 11/29/2017  Review Complete On: 11/29/2017 By: Jennett Lanes, MD  
  
 Severity Noted Reaction Type Reactions Januvia [Sitagliptin] Medium 07/23/2014   Side Effect Rash  
 B12 [Cyanocobalamin-cobamamide]  12/19/2011    Hives  
 w/injection Decongestant [Brompheniramine Maleate]  12/19/2011    Other (comments)  
 heart Current Immunizations  Reviewed on 1/26/2017 Name Date Influenza High Dose Vaccine PF 10/15/2016, 10/31/2015 Influenza Vaccine 10/15/2014, 10/16/2013 Influenza Vaccine Whole 10/8/2010 Pneumococcal Conjugate (PCV-13) 10/31/2015 Pneumococcal Polysaccharide (PPSV-23) 1/26/2017 Tdap 9/14/2015 Zoster 12/1/2009 Not reviewed this visit You Were Diagnosed With   
  
 Codes Comments Neck muscle spasm    -  Primary ICD-10-CM: S54.979 ICD-9-CM: 728.85 Controlled type 2 diabetes mellitus without complication, without long-term current use of insulin ([facility]     ICD-10-CM: E11.9 ICD-9-CM: 250.00 Anxiety disorder due to general medical condition     ICD-10-CM: F06.4 ICD-9-CM: 293.84 Vitals BP Pulse Temp Resp Height(growth percentile) Weight(growth percentile) 140/64 (BP 1 Location: Left arm, BP Patient Position: Sitting) 79 98.4 °F (36.9 °C) (Oral) 18 4' 11.13\" (1.502 m) 163 lb 9.6 oz (74.2 kg) SpO2 BMI OB Status Smoking Status 95% 32.9 kg/m2 Postmenopausal Never Smoker Vitals History BMI and BSA Data Body Mass Index Body Surface Area 32.9 kg/m 2 1.76 m 2 Preferred Pharmacy Pharmacy Name Phone [id number] [address] [facility] [phone] Your Updated Medication List  
  
   
This list is accurate as of: 11/29/17 12:26 PM.  Always use your most recent med list.  
  
  
  
  
 Teresita Gum Take 220 mg by mouth daily as needed. amLODIPine 2.5 mg tablet Commonly known as:  Chandler Samano Take 2.5 mg by mouth daily. Pt unsure of dosage  
  
 aspirin delayed-release 81 mg tablet Take  by mouth daily. Every other day  
  
 biotin 2,500 mcg Tab Take 2,500 Units by mouth. diclofenac 1 % Gel Commonly known as:  VOLTAREN Apply  to affected area four (4) times daily. finasteride 5 mg tablet Commonly known as:  PROSCAR Take 5 mg by mouth daily. fluticasone 50 mcg/actuation nasal spray Commonly known as:  Valentinasheila Telles 2 Sprays by Both Nostrils route daily. glipiZIDE 5 mg tablet Commonly known as:  Dolph Siad Take 1 and half tabs with breakfast and 1 tab with dinner * glucose blood VI test strips strip Commonly known as:  ONETOUCH ULTRA TEST  
TEST BLOOD SUGAR Twice DAILY DX Code: E11.9  
  
 * glucose blood VI test strips strip Commonly known as:  ONETOUCH ULTRA TEST  
TEST BLOOD SUGAR Twice DAILY DX Code: E11.9 LORazepam 0.5 mg tablet Commonly known as:  ATIVAN Take 0.5 mg by mouth daily as needed. metFORMIN 500 mg tablet Commonly known as:  GLUCOPHAGE Take 1 Tab by mouth two (2) times daily (with meals). MULTIVITAMIN PO Take 1 Tab by mouth daily. VITAMIN B-12 500 mcg tablet Generic drug:  cyanocobalamin Take 500 mcg by mouth two (2) times a day. VITAMIN D3 1,000 unit Cap Generic drug:  cholecalciferol Take 1,000 Units by mouth. * Notice: This list has 2 medication(s) that are the same as other medications prescribed for you. Read the directions carefully, and ask your doctor or other care provider to review them with you. Follow-up Instructions Return in about 4 months (around 3/29/2018) for Follow-up. Patient Instructions It was a pleasure to see you! As discussed: Anxiety Decrease Ativan to twice a week as needed. Let me know if you have any worsened anxiety or side effects with this trial.  
 
Neck Tightness Try finding a therapist using the list provided and www. psychologytoday.com. For relaxation techniques and breathing exercises. Alice Mueller [address] [place] [address] Jon Fumoallen 
[phone] Neck Spasm: Care Instructions Your Care Instructions A neck spasm is sudden tightness and pain in your neck muscles. A spasm may be caused by some activities or repeated movements. For example, you may be more likely to have a neck spasm if you slouch, paint a ceiling, work at a computer, or sleep with your neck twisted. But the cause isn't always clear.  
Home treatment includes using heat or ice, taking over-the-counter (OTC) pain medicines, and avoiding activities that may lead to neck pain. Gentle stretching, or treatments such as massage or manipulation, may also help ease a neck spasm. For a neck spasm that doesn't get better with home care, your doctor may prescribe medicine. He or she may also suggest exercise or physical therapy to help strengthen or relax your neck muscles. Follow-up care is a key part of your treatment and safety. Be sure to make and go to all appointments, and call your doctor if you are having problems. It's also a good idea to know your test results and keep a list of the medicines you take. How can you care for yourself at home? · To relieve pain, use heat or ice (whichever feels better) on the affected area. ¨ Put a warm water bottle, a heating pad set on low, or a warm cloth on your neck. Put a thin cloth between the heating pad and your skin. Do not go to sleep with a heating pad on your skin. ¨ Try ice or a cold pack on the area for 10 to 20 minutes at a time. Put a thin cloth between the ice and your skin. · Ask your doctor if you can take acetaminophen (such as Tylenol) or nonsteroidal anti-inflammatory drugs, such as ibuprofen or naproxen. Your doctor can prescribe stronger medicines if needed. Be safe with medicines. Read and follow all instructions on the label. · Stretch your muscles every day, especially before and after exercise and at bedtime. Regular stretching can help relax your muscles. · Try to find a pillow and a position in bed that help improve your night's rest. 
· Try to stay active. It's best to start activity slowly. If an exercise makes your painworse, stop doing it. When should you call for help? Call 911 anytime you think you may need emergency care. For example, call if: 
? · You are unable to move an arm or a leg at all. ?Call your doctor now or seek immediate medical care if: 
? · You have new or worse symptoms in your arms, legs, belly, or buttocks. Symptoms may include: ¨ Numbness or tingling. ¨ Weakness. ¨ Pain. ? · You lose bladder or bowel control. ? Watch closely for changes in your health, and be sure to contact your doctor if: 
? · You do not get better as expected. Where can you learn more? Go to http://idania-skip.info/. Enter X996 in the search box to learn more about \"Neck Spasm: Care Instructions. \" Current as of: March 21, 2017 Content Version: 11.4 © 2882-4022 [employer]. Care instructions adapted under license by [employer] (which disclaims liability or warranty for this information). If you have questions about a medical condition or this instruction, always ask your healthcare professional. [address] any warranty or liability for your use of this information. Patient Instructions History Introducing [facility]! Dear Carmelita Pérez: Thank you for requesting a [employer] account. Our records indicate that you already have an active [employer] account. You can access your account anytime at [email or web address]. [email or web address] [place] Did you know that you can access your hospital and ER discharge instructions at any time in [employer]? You can also review all of your test results from your hospital stay or ER visit. Additional Information If you have questions, please visit the Frequently Asked Questions section of the [employer] website at [email or web address] Remember, [employer] is NOT to be used for urgent needs. For medical emergencies, dial 911. Now available from your iPhone and Android! Please provide this summary of care documentation to your next provider. Your primary care clinician is listed as Will Cruz. If you have any questions after today's visit, please call [phone].

## 2017-11-29 NOTE — PROGRESS NOTES
Chief Complaint   Patient presents with    Anxiety     1. Have you been to the ER, urgent care clinic since your last visit? Hospitalized since your last visit? No    2. Have you seen or consulted any other health care providers outside of the [address] [place] [address] since your last visit? Include any pap smears or colon screening.  Yes Where: Dermatologist Reason for visit: Skin problems/Cardiologist Dr Babatunde Jarvis

## 2017-12-15 RX ORDER — GLIPIZIDE 5 MG/1
TABLET ORAL
Qty: 225 TAB | Refills: 1 | Status: SHIPPED | OUTPATIENT
Start: 2017-12-15 | End: 2018-06-21 | Stop reason: SDUPTHER

## 2017-12-15 NOTE — TELEPHONE ENCOUNTER
12/15/2017  1:42 PM        Ms. Eric Goodman would like a refill for glipiZIDE (GLUCOTROL) 5 mg tablet    MsKevin Eric Goodman also stated that Dr. Shailesh Golden knows about future refill for this medication.         Please send to Brandon Dobbs, [address] [facility] [address]

## 2017-12-29 NOTE — PROGRESS NOTES
Ms. Yatesjake Luciano,  Thank you for completing your labs that you [facility]. No medication changes are needed. I have informed Dr. Jaciel Barkley of your A1c for your upcoming appointment in January. Happy New Year!

## 2018-01-03 ENCOUNTER — OFFICE VISIT (OUTPATIENT)
Dept: ENDOCRINOLOGY | Age: 78
End: 2018-01-03

## 2018-01-03 VITALS
RESPIRATION RATE: 16 BRPM | HEIGHT: 59 IN | HEART RATE: 89 BPM | OXYGEN SATURATION: 98 % | BODY MASS INDEX: 33.22 KG/M2 | SYSTOLIC BLOOD PRESSURE: 140 MMHG | DIASTOLIC BLOOD PRESSURE: 78 MMHG | WEIGHT: 164.8 LBS

## 2018-01-03 DIAGNOSIS — E11.9 CONTROLLED TYPE 2 DIABETES MELLITUS WITHOUT COMPLICATION, WITHOUT LONG-TERM CURRENT USE OF INSULIN (HCC): Primary | ICD-10-CM

## 2018-01-03 RX ORDER — HYDROCODONE BITARTRATE AND ACETAMINOPHEN 10; 325 MG/1; MG/1
TABLET ORAL
Refills: 0 | COMMUNITY
Start: 2017-12-06 | End: 2018-09-12

## 2018-01-03 RX ORDER — AMOXICILLIN 875 MG/1
TABLET, FILM COATED ORAL
Refills: 0 | COMMUNITY
Start: 2017-12-06 | End: 2019-01-11

## 2018-01-03 RX ORDER — INSULIN PUMP SYRINGE, 3 ML
EACH MISCELLANEOUS
Qty: 1 KIT | Refills: 0 | Status: SHIPPED | OUTPATIENT
Start: 2018-01-03 | End: 2018-01-10 | Stop reason: SDUPTHER

## 2018-01-03 NOTE — MR AVS SNAPSHOT
Visit Information Date & Time Provider Department Dept. Phone Encounter #  
 1/3/2018 10:50 AM Kimberly Loredo MD [facility] [phone] [id number] Follow-up Instructions Return in about 4 months (around 5/3/2018). Your Appointments 10/1/2018 11:00 AM  
Any with Tono Goodman MD  
[address] [facility] ([facility]) Appt Note: yearly PAP follow up Anna [address] [address] [address]  
  
   
 [address] [facility] [address] [place] [place] [address] Upcoming Health Maintenance Date Due  
 FOOT EXAM Q1 5/8/2018* HEMOGLOBIN A1C Q6M 5/16/2018 MICROALBUMIN Q1 6/5/2018 EYE EXAM RETINAL OR DILATED Q1 8/8/2018 MEDICARE YEARLY EXAM 8/16/2018 LIPID PANEL Q1 11/16/2018 GLAUCOMA SCREENING Q2Y 8/8/2019 DTaP/Tdap/Td series (2 - Td) 9/14/2025 *Topic was postponed. The date shown is not the original due date. Allergies as of 1/3/2018  Review Complete On: 1/3/2018 By: Paulie Flores Severity Noted Reaction Type Reactions Januvia [Sitagliptin] Medium 07/23/2014   Side Effect Rash  
 B12 [Cyanocobalamin-cobamamide]  12/19/2011    Hives  
 w/injection Decongestant [Brompheniramine Maleate]  12/19/2011    Other (comments)  
 heart Current Immunizations  Reviewed on 1/26/2017 Name Date Influenza High Dose Vaccine PF 10/15/2016, 10/31/2015 Influenza Vaccine 10/15/2014, 10/16/2013 Influenza Vaccine Whole 10/8/2010 Pneumococcal Conjugate (PCV-13) 10/31/2015 Pneumococcal Polysaccharide (PPSV-23) 1/26/2017 Tdap 9/14/2015 Zoster 12/1/2009 Not reviewed this visit You Were Diagnosed With   
  
 Codes Comments Controlled type 2 diabetes mellitus without complication, without long-term current use of insulin [facility]    -  Primary ICD-10-CM: E11.9 ICD-9-CM: 250.00 Vitals BP Pulse Resp Height(growth percentile) Weight(growth percentile) SpO2  
 140/78 89 16 4' 11\" (1.499 m) 164 lb 12.8 oz (74.8 kg) 98% BMI OB Status Smoking Status 33.29 kg/m2 Postmenopausal Never Smoker Vitals History BMI and BSA Data Body Mass Index Body Surface Area  
 33.29 kg/m 2 1.76 m 2 Preferred Pharmacy Pharmacy Name Phone [id number] [phone] Your Updated Medication List  
  
   
This list is accurate as of: 1/3/18 11:29 AM.  Always use your most recent med list.  
  
  
  
  
 Zeeshan Posadas Take 220 mg by mouth daily as needed. amLODIPine 2.5 mg tablet Commonly known as:  Sarita Perez Take 2.5 mg by mouth daily. Pt unsure of dosage  
  
 amoxicillin 875 mg tablet Commonly known as:  AMOXIL TAKE ONE TABLET BY MOUTH TWICE A DAY UNTIL GONE  
  
 aspirin delayed-release 81 mg tablet Take  by mouth daily. Every other day  
  
 biotin 2,500 mcg Tab Take 2,500 Units by mouth. Blood-Glucose Meter monitoring kit Check BG 2 times/day  
  
 diclofenac 1 % Gel Commonly known as:  VOLTAREN Apply  to affected area four (4) times daily. finasteride 5 mg tablet Commonly known as:  PROSCAR Take 5 mg by mouth daily. fluticasone 50 mcg/actuation nasal spray Commonly known as:  Luca Remedies 2 Sprays by Both Nostrils route daily. glipiZIDE 5 mg tablet Commonly known as:  Carol Buffy Take 1 and half tabs with breakfast and 1 tab with dinner  
  
 glucose blood VI test strips strip Commonly known as:  blood glucose test  
Check BG 2 times/day HYDROcodone-acetaminophen  mg tablet Commonly known as:  NORCO  
TAKE ONE TABLET BY MOUTH EVERY 4 TO 6 HOURS AS NEEDED FOR PAIN  
  
 LORazepam 0.5 mg tablet Commonly known as:  ATIVAN Take 0.5 mg by mouth daily as needed. metFORMIN 500 mg tablet Commonly known as:  GLUCOPHAGE  
 Take 1 Tab by mouth two (2) times daily (with meals). MULTIVITAMIN PO Take 1 Tab by mouth daily. VITAMIN B-12 500 mcg tablet Generic drug:  cyanocobalamin Take 500 mcg by mouth two (2) times a day. VITAMIN D3 1,000 unit Cap Generic drug:  cholecalciferol Take 1,000 Units by mouth. Prescriptions Sent to Pharmacy Refills Blood-Glucose Meter monitoring kit 0 Sig: Check BG 2 times/day Class: Normal  
 Pharmacy: [id number] at [address] [place] [address] Ph #: [phone]  
 glucose blood VI test strips (BLOOD GLUCOSE TEST) strip 11 Sig: Check BG 2 times/day Class: Normal  
 Pharmacy: [id number] at [address] [place] [address] Ph #: [phone] We Performed the Following BASIC METABOLIC PANEL (7) [32109 CPT(R)] HEMOGLOBIN A1C WITH EAG [21126 CPT(R)] TSH AND FREE T4 [82779 CPT(R)] Follow-up Instructions Return in about 4 months (around 5/3/2018). Patient Instructions Try increasing metformin to 1000mg at night. If your stomach doesn't tolerate that, resume the 500mg dose twice a day and try increasing your evening glipizide dose to 7.5mg. Send me a [employer] message and let me know which one works so I can update your prescriptions. Introducing [facility]! Dear Olimpia Redding: Thank you for requesting a [employer] account. Our records indicate that you already have an active [employer] account. You can access your account anytime at [email or web address] Did you know that you can access your hospital and ER discharge instructions at any time in [employer]? You can also review all of your test results from your hospital stay or ER visit. Additional Information If you have questions, please visit the Frequently Asked Questions section of the [employer] website at [email or web address] Remember, MyChart is NOT to be used for urgent needs. For medical emergencies, dial 911. Now available from your iPhone and Android! Please provide this summary of care documentation to your next provider. Your primary care clinician is listed as Marvin Duran. If you have any questions after today's visit, please call [phone].

## 2018-01-03 NOTE — PROGRESS NOTES
Endocrinology Visit    Chief Complaint: Type 2 diabetes    History of Present Illness: Clem Haque is a 76 y.o. female who returns for type 2 diabetes mellitus. A1c was 7.3% in January. I saw her in initial consultation in June at which time A1c had improved to 6.5% so no changes were made to her regimen. In the interim, she reports dealing with a significant amount of stress from her 's heart attack last summer. Also trying to wean down her Ativan. Blood sugars were doing okay until the holidays - now gradually creeping up a little. She has no complaints today other than joint pain and swollen lymph nodes, has had some sinus drainage. Will be undergoing hand surgery soon. Current glycemic medication regimen is metformin 500mg BID and glipizide 7.5mg Qam before breakfast and 5mg before dinner. Home blood glucose monitoring frequency: twice a day  Glucose range at home: 109-202; fastings range from 130-190  The patient has not had hypoglycemia recently. She has no known complications from diabetes. Has cataracts but no diabetic retinopathy. Review of Systems as above, otherwise a 7 pt review is negative    Problem List:  Patient Active Problem List   Diagnosis Code    Diabetes mellitus type 2, controlled ([address] E11.9    Urge incontinence of urine N39.41    Atrial premature depolarization I49.1    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 9) E66.9    Chronic prescription benzodiazepine use Z79.899    Mitral valve prolapse, nonrheumatic I34.1    Osteoarthritis involving multiple joints on both sides of body M15.9       Past Medical History:    Past Medical History:   Diagnosis Date    Diabetes ([address]     adult-onset    IBS (irritable bowel syndrome)     Irregular heart beat     (PVCs)    MVP (mitral valve prolapse)     h/o (neg ECHO-3/06)       Past Surgical History:  Past Surgical History:   Procedure Laterality Date    HX HEENT      tonsilectomy    HX HYSTERECTOMY      partial Hysterectomy    HX ORTHOPAEDIC      left knee-arthroscopic    HX ORTHOPAEDIC      carpal tunnel       Social History:  Social History     Social History    Marital status:      Spouse name: N/A    Number of children: N/A    Years of education: N/A     Occupational History    Not on file. Social History Main Topics    Smoking status: Never Smoker    Smokeless tobacco: Never Used    Alcohol use 0.0 oz/week     0 Standard drinks or equivalent per week      Comment: occassionally    Drug use: No    Sexual activity: No     Other Topics Concern    Not on file     Social History Narrative       Family History:  Family History   Problem Relation Age of Onset    Heart Failure Father      CHF    Emphysema Father      (smoker)    COPD Father     Arthritis-osteo Mother     Arrhythmia Mother     Heart Attack Mother     Heart Attack Maternal Grandmother        Medications:     Current Outpatient Prescriptions:     glipiZIDE (GLUCOTROL) 5 mg tablet, Take 1 and half tabs with breakfast and 1 tab with dinner, Disp: 225 Tab, Rfl: 1    diclofenac (VOLTAREN) 1 % gel, Apply  to affected area four (4) times daily. , Disp: , Rfl:     metFORMIN (GLUCOPHAGE) 500 mg tablet, Take 1 Tab by mouth two (2) times daily (with meals). , Disp: 180 Tab, Rfl: 1    glucose blood VI test strips (ONETOUCH ULTRA TEST) strip, TEST BLOOD SUGAR Twice DAILY DX Code: E11.9, Disp: 100 Strip, Rfl: 0    fluticasone (FLONASE) 50 mcg/actuation nasal spray, 2 Sprays by Both Nostrils route daily. , Disp: 1 Bottle, Rfl: 0    amLODIPine (NORVASC) 2.5 mg tablet, Take 2.5 mg by mouth daily.  Pt unsure of dosage, Disp: , Rfl:     cyanocobalamin (VITAMIN B-12) 500 mcg tablet, Take 500 mcg by mouth two (2) times a day., Disp: , Rfl:     biotin 2,500 mcg tab, Take 2,500 Units by mouth., Disp: , Rfl:     Cholecalciferol, Vitamin D3, (VITAMIN D3) 1,000 unit cap, Take 1,000 Units by mouth., Disp: , Rfl:     MULTIVITAMIN PO, Take 1 Tab by mouth daily. , Disp: , Rfl:     aspirin delayed-release 81 mg tablet, Take  by mouth daily. Every other day, Disp: , Rfl:     LORazepam (ATIVAN) 0.5 mg tablet, Take 0.5 mg by mouth daily as needed. , Disp: , Rfl:     amoxicillin (AMOXIL) 875 mg tablet, TAKE ONE TABLET BY MOUTH TWICE A DAY UNTIL GONE, Disp: , Rfl: 0    HYDROcodone-acetaminophen (NORCO)  mg tablet, TAKE ONE TABLET BY MOUTH EVERY 4 TO 6 HOURS AS NEEDED FOR PAIN, Disp: , Rfl: 0    finasteride (PROSCAR) 5 mg tablet, Take 5 mg by mouth daily. , Disp: , Rfl:     NAPROXEN SODIUM (ALEVE PO), Take 220 mg by mouth daily as needed. , Disp: , Rfl:     Allergies: Allergies   Allergen Reactions    Januvia [Sitagliptin] Rash    B12 [Cyanocobalamin-Cobamamide] Hives     w/injection    Decongestant [Brompheniramine Maleate] Other (comments)     heart       Physical Examination:  Blood pressure 140/78, pulse 89, resp. rate 16, height 4' 11\" (1.499 m), weight 164 lb 12.8 oz (74.8 kg), SpO2 98 %. Gen: no acute distress  HEENT: mucous membranes moist  Thyroid: no enlargement or nodules noted, + submandibular fullness  CAD: normal rate, regular rhythm. No murmur rubs or gallops  PULM: clear to ausculation, no wheezes, rhonchis or rales.   EXT: no clubbing, cyanosis or edema; PT pulses 2+  Neuro: grossly non focal, normal DTRs  Psych: pleasant, good insight into medical hx  Skin: warm, dry      Clinical Data Review:  Lab Results   Component Value Date/Time    Hemoglobin A1c 6.8 11/16/2017 10:10 AM    Hemoglobin A1c (POC) 7.2 04/26/2017 02:57 PM     Lab Results   Component Value Date/Time    Sodium 142 11/16/2017 10:10 AM    Potassium 4.2 11/16/2017 10:10 AM    Chloride 101 11/16/2017 10:10 AM    CO2 25 11/16/2017 10:10 AM    Anion gap 11 02/02/2010 09:45 AM    Glucose 158 11/16/2017 10:10 AM    BUN 14 11/16/2017 10:10 AM    Creatinine 0.69 11/16/2017 10:10 AM    BUN/Creatinine ratio 20 11/16/2017 10:10 AM    GFR est AA 96 11/16/2017 10:10 AM    GFR est non-AA 83 11/16/2017 10:10 AM    Calcium 9.5 11/16/2017 10:10 AM     Lab Results   Component Value Date/Time    Microalb/Creat ratio (ug/mg creat.) 11.2 06/05/2017 04:04 PM     Lab Results   Component Value Date/Time    Cholesterol, total 171 11/16/2017 10:10 AM    HDL Cholesterol 62 11/16/2017 10:10 AM    LDL, calculated 89 11/16/2017 10:10 AM    VLDL, calculated 20 11/16/2017 10:10 AM    Triglyceride 101 11/16/2017 10:10 AM    CHOL/HDL Ratio 2.9 02/02/2010 09:45 AM       Assessment and Plan:  Patient is a 76 y.o. female here for type 2 diabetes, control of which appears to be adequate goal based on fingerstick glucose data. We discussed that goal A1c for her is less than ~7.5%. Since fasting sugars are uptrending, will increase evening metformin dose as below. If she has GI side effects, will likely recommend that she increase the evening glipizide instead.      Glycemic Medication Changes:  - increase metformin to 500mg Qam, 1000mg Qpm  - continue glipizide 7.5mg Qam, 5mg Qpm (30 min AC meals)  - notify me for BG<70    DM Health Maintenance: pertinent items updated in HM tab  A1c: update in 4 months before next visit  Cv/Lipids: not on statin, LDL is <100 and I don't want to risk statin s/e at thist lito  BP/Renal: BP at goal, not on ACEi, microalbumin: UTD and nonelevated- consider low-dose ACEi in the future  Vaccines: per PCP  Podiatry: no active issues, encouraged well-fitting footwear and daily inspection  Neuro: foot exam UTD  Ophtho: yearly eye exam recommended  Diet and exercise: discussed healthy eating and exercise recommendations, particularly reduction in dietary carbohydrates    I spent 25 minutes with the patient today and > 50% of the time was spent counseling the patient about diabetes management including medication options and dietary modification. Patient verbalized an understanding and will return to clinic in 4 months. Thank you for the opportunity to participate in this patient's care.     Anish Nelson MD  [facility]  [facility]

## 2018-01-03 NOTE — PROGRESS NOTES
Lab Results   Component Value Date/Time    Hemoglobin A1c 6.8 11/16/2017 10:10 AM    Hemoglobin A1c 6.5 06/05/2017 04:04 PM    Hemoglobin A1c 7.3 01/26/2017 12:52 PM

## 2018-01-26 DIAGNOSIS — E11.9 CONTROLLED TYPE 2 DIABETES MELLITUS WITHOUT COMPLICATION, WITHOUT LONG-TERM CURRENT USE OF INSULIN (HCC): ICD-10-CM

## 2018-01-26 RX ORDER — METFORMIN HYDROCHLORIDE 500 MG/1
500 TABLET ORAL 2 TIMES DAILY WITH MEALS
Qty: 180 TAB | Refills: 1 | Status: SHIPPED | OUTPATIENT
Start: 2018-01-26 | End: 2018-02-07 | Stop reason: SDUPTHER

## 2018-02-06 ENCOUNTER — PATIENT MESSAGE (OUTPATIENT)
Dept: ENDOCRINOLOGY | Age: 78
End: 2018-02-06

## 2018-02-06 DIAGNOSIS — E11.9 CONTROLLED TYPE 2 DIABETES MELLITUS WITHOUT COMPLICATION, WITHOUT LONG-TERM CURRENT USE OF INSULIN (HCC): ICD-10-CM

## 2018-02-07 RX ORDER — METFORMIN HYDROCHLORIDE 500 MG/1
TABLET ORAL
Qty: 270 TAB | Refills: 3 | Status: SHIPPED | OUTPATIENT
Start: 2018-02-07 | End: 2018-05-03 | Stop reason: SINTOL

## 2018-02-07 NOTE — TELEPHONE ENCOUNTER
From: Megha Huang  To: Apoorva Espinal MD  Sent: 2/6/2018 2:15 PM EST  Subject: Visit Follow-Up Question    Dr. Coco Veloz: The metformin was increased as prescribed, to add 1- 500 mg tab at dinnertime. I have been on this amount (1500 per day) since my last visit, it seems to be helping a bit. My blood sugar this morning after fasting overnight, was 126 which is much better than the 150 plus. Most often it is around 140 - depending on my evening meal and time the meal is eaten. I can handle the medication increase, it has not affected my intestines since the first week of consuming the added dose. However the pharmacy still has the former 1000 mg tab per day prescribed. Shall I just stay with this amount for now, unless the readings start to increase?   Thanks, Radha Lee

## 2018-03-29 ENCOUNTER — OFFICE VISIT (OUTPATIENT)
Dept: INTERNAL MEDICINE CLINIC | Age: 78
End: 2018-03-29

## 2018-03-29 VITALS
BODY MASS INDEX: 33.67 KG/M2 | WEIGHT: 167 LBS | HEIGHT: 59 IN | TEMPERATURE: 98.3 F | SYSTOLIC BLOOD PRESSURE: 136 MMHG | OXYGEN SATURATION: 97 % | HEART RATE: 83 BPM | DIASTOLIC BLOOD PRESSURE: 60 MMHG | RESPIRATION RATE: 16 BRPM

## 2018-03-29 DIAGNOSIS — E11.9 CONTROLLED TYPE 2 DIABETES MELLITUS WITHOUT COMPLICATION, WITHOUT LONG-TERM CURRENT USE OF INSULIN (HCC): ICD-10-CM

## 2018-03-29 DIAGNOSIS — F06.4 ANXIETY DISORDER DUE TO GENERAL MEDICAL CONDITION: ICD-10-CM

## 2018-03-29 DIAGNOSIS — R09.89 THROAT TIGHTNESS: Primary | ICD-10-CM

## 2018-03-29 DIAGNOSIS — R09.82 PND (POST-NASAL DRIP): ICD-10-CM

## 2018-03-29 RX ORDER — SERTRALINE HYDROCHLORIDE 25 MG/1
25 TABLET, FILM COATED ORAL DAILY
Qty: 30 TAB | Refills: 4 | Status: SHIPPED | OUTPATIENT
Start: 2018-03-29 | End: 2018-08-23 | Stop reason: SDUPTHER

## 2018-03-29 RX ORDER — MINERAL OIL
180 ENEMA (ML) RECTAL DAILY
Qty: 30 TAB | Refills: 4 | Status: SHIPPED | OUTPATIENT
Start: 2018-03-29 | End: 2020-10-14

## 2018-03-29 NOTE — PROGRESS NOTES
Chief Complaint   Patient presents with    Diabetes     1. Have you been to the ER, urgent care clinic since your last visit? Hospitalized since your last visit? No    2. Have you seen or consulted any other health care providers outside of the [facility] since your last visit? Include any pap smears or colon screening. No    complains of Neck pain is not better, head congestion, post nasal drip.  Face, back and chest rash at night, itching

## 2018-03-29 NOTE — MR AVS SNAPSHOT
[address] [facility] [address] [facility] 
[phone] Patient: Singh Torrez MRN: [id number] Visit Information Date & Time Provider Department Dept. Phone Encounter #  
 3/29/2018  2:30 PM Sherron Yang MD [facility] Internal Medicine [phone] [id number] Follow-up Instructions Return in about 4 months (around 2018) for Physical - 30 minute appointment, Medicare Wellness. Your Appointments 5/3/2018 10:50 AM  
ROUTINE CARE with Sylvia Scales MD  
[facility] [address] [place] [address]) Appt Note: F/U DIABETES CP0.00  
 [address]  
[phone]  
  
   
 [address] [facility]  
  
    
 10/1/2018 11:00 AM  
Any with Kaylynn Pickett MD  
[address] [place] [address] ([facility]) Appt Note: yearly PAP follow up [address] [id number] Upcoming Health Maintenance Date Due  
 FOOT EXAM Q1 2018* HEMOGLOBIN A1C Q6M 2018 MICROALBUMIN Q1 2018 EYE EXAM RETINAL OR DILATED Q1 2018 MEDICARE YEARLY EXAM 2018 LIPID PANEL Q1 2018 GLAUCOMA SCREENING Q2Y 2019 DTaP/Tdap/Td series (2 - Td) 2025 *Topic was postponed. The date shown is not the original due date. Allergies as of 3/29/2018  Review Complete On: 3/29/2018 By: Maricruz Ordoñez LPN Severity Noted Reaction Type Reactions Januvia [Sitagliptin] Medium 2014   Side Effect Rash  
 B12 [Cyanocobalamin-cobamamide]  2011    Hives  
 w/injection Decongestant [Brompheniramine Maleate]  2011    Other (comments)  
 heart Current Immunizations  Reviewed on 2017 Name Date Influenza High Dose Vaccine PF 10/15/2016, 10/31/2015 Influenza Vaccine 10/15/2014, 10/16/2013 Influenza Vaccine Whole 10/8/2010 Pneumococcal Conjugate (PCV-13) 10/31/2015 Pneumococcal Polysaccharide (PPSV-23) 1/26/2017 Tdap 9/14/2015 Zoster 12/1/2009 Not reviewed this visit You Were Diagnosed With   
  
 Codes Comments Throat tightness    -  Primary ICD-10-CM: R68.89 ICD-9-CM: 784.99 Anxiety disorder due to general medical condition     ICD-10-CM: F06.4 ICD-9-CM: 293.84 PND (post-nasal drip)     ICD-10-CM: R09.82 ICD-9-CM: 784.91 Controlled type 2 diabetes mellitus without complication, without long-term current use of insulin ([address]     ICD-10-CM: E11.9 ICD-9-CM: 250.00 Vitals BP Pulse Temp Resp Height(growth percentile) Weight(growth percentile) 136/60 (BP 1 Location: Right arm, BP Patient Position: Sitting) 83 98.3 °F (36.8 °C) (Oral) 16 4' 11\" (1.499 m) 167 lb (75.8 kg) SpO2 BMI OB Status Smoking Status 97% 33.73 kg/m2 Postmenopausal Never Smoker Vitals History BMI and BSA Data Body Mass Index Body Surface Area  
 33.73 kg/m 2 1.78 m 2 Preferred Pharmacy Pharmacy Name Phone [id number] [address] [facility] [phone] Your Updated Medication List  
  
   
This list is accurate as of 3/29/18  3:40 PM.  Always use your most recent med list.  
  
  
  
  
 Reji Kim Take 220 mg by mouth daily as needed. amLODIPine 2.5 mg tablet Commonly known as:  Dianne Prem Take 2.5 mg by mouth daily. Pt unsure of dosage  
  
 amoxicillin 875 mg tablet Commonly known as:  AMOXIL TAKE ONE TABLET BY MOUTH TWICE A DAY UNTIL GONE  
  
 aspirin delayed-release 81 mg tablet Take  by mouth daily. Every other day  
  
 biotin 2,500 mcg Tab Take 2,500 Units by mouth. Blood-Glucose Meter monitoring kit Check BG 2 times/day; onetouch meter; E11.9  
  
 diclofenac 1 % Gel Commonly known as:  VOLTAREN  
 Apply  to affected area four (4) times daily. fexofenadine 180 mg tablet Commonly known as:  Carlos Devine Take 1 Tab by mouth daily. finasteride 5 mg tablet Commonly known as:  PROSCAR Take 5 mg by mouth daily. fluticasone 50 mcg/actuation nasal spray Commonly known as:  Ericka Finders 2 Sprays by Both Nostrils route daily. glipiZIDE 5 mg tablet Commonly known as:  Sue Kingdom Take 1 and half tabs with breakfast and 1 tab with dinner  
  
 glucose blood VI test strips strip Commonly known as:  blood glucose test  
Check BG 2 times/day HYDROcodone-acetaminophen  mg tablet Commonly known as:  NORCO  
TAKE ONE TABLET BY MOUTH EVERY 4 TO 6 HOURS AS NEEDED FOR PAIN  
  
 LORazepam 0.5 mg tablet Commonly known as:  ATIVAN Take 0.5 mg by mouth daily as needed. metFORMIN 500 mg tablet Commonly known as:  GLUCOPHAGE  
500mg Qam, 1000mg Qpm  
  
 MULTIVITAMIN PO Take 1 Tab by mouth daily. sertraline 25 mg tablet Commonly known as:  ZOLOFT Take 1 Tab by mouth daily. VITAMIN B-12 500 mcg tablet Generic drug:  cyanocobalamin Take 500 mcg by mouth two (2) times a day. VITAMIN D3 1,000 unit Cap Generic drug:  cholecalciferol Take 1,000 Units by mouth. Prescriptions Sent to Pharmacy Refills  
 sertraline (ZOLOFT) 25 mg tablet 4 Sig: Take 1 Tab by mouth daily. Class: Normal  
 Pharmacy: [id number] [address] [place] [address] Ph #: [phone] Route: Oral  
 fexofenadine (ALLEGRA) 180 mg tablet 4 Sig: Take 1 Tab by mouth daily. Class: Normal  
 Pharmacy: [id number] at [address] [place] [address] Ph #: [phone] Route: Oral  
  
We Performed the Following LIPID PANEL [15484 CPT(R)] METABOLIC PANEL, COMPREHENSIVE [98132 CPT(R)] REFERRAL TO ENT-OTOLARYNGOLOGY [HQS36 Custom] Follow-up Instructions Return in about 4 months (around 7/29/2018) for Physical - 30 minute appointment, Medicare Wellness. To-Do List   
 03/29/2018 Imaging:  US THYROID/PARATHYROID/SOFT TISS Referral Information Referral ID Referred By Referred To  
  
 [id number] JOV[facility], [address] Nicholas Gibson MD   
   [address] Phone: [phone] Fax: [phone] Visits Status Start Date End Date 1 New Request 3/29/18 3/29/19 If your referral has a status of pending review or denied, additional information will be sent to support the outcome of this decision. Patient Instructions It was a pleasure to see you! As discussed: Anxiety I have prescribed Zoloft. It may make your symptoms worse int the first 2 weeks before improving your symptoms. Keyla Cruz Recommended pt try finding a therapist using the list provided and www. [email or web address] [address] [facility] 
[phone] If you have any thoughts of harming yourself or others please seek immediate medical attention. If you have non urgent concerns, feel free to contact the office                  When should you call for help? Call 911 anytime you think you may need emergency care. For example, call if: 
· You feel you cannot stop from hurting yourself or someone else. Call your doctor now or seek immediate medical care if: 
· You hear voices. · You feel much more depressed. Post nasal drip 
-Use nasal saline spray 3-4 times/day  
-Start non sedating antihistamine such as Claritin, Allegra or Zyrtec (generic is fine) in the day;  
 
 
  
Neck Pain: Care Instructions Your Care Instructions You can have neck pain anywhere from the bottom of your head to the top of your shoulders. It can spread to the upper back or arms.  Injuries, painting a ceiling, sleeping with your neck twisted, staying in one position for too long, and many other activities can cause neck pain. Most neck pain gets better with home care. Your doctor may recommend medicine to relieve pain or relax your muscles. He or she may suggest exercise and physical therapy to increase flexibility and relieve stress. You may need to wear a special (cervical) collar to support your neck for a day or two. Follow-up care is a key part of your treatment and safety. Be sure to make and go to all appointments, and call your doctor if you are having problems. It's also a good idea to know your test results and keep a list of the medicines you take. How can you care for yourself at home? · Try using a heating pad on a low or medium setting for 15 to 20 minutes every 2 or 3 hours. Try a warm shower in place of one session with the heating pad. · You can also try an ice pack for 10 to 15 minutes every 2 to 3 hours. Put a thin cloth between the ice and your skin. · Take pain medicines exactly as directed. ¨ If the doctor gave you a prescription medicine for pain, take it as prescribed. ¨ If you are not taking a prescription pain medicine, ask your doctor if you can take an over-the-counter medicine. · If your doctor recommends a cervical collar, wear it exactly as directed. When should you call for help? Call your doctor now or seek immediate medical care if: 
? · You have new or worsening numbness in your arms, buttocks or legs. ? · You have new or worsening weakness in your arms or legs. (This could make it hard to stand up.) ? · You lose control of your bladder or bowels. ? Watch closely for changes in your health, and be sure to contact your doctor if: 
? · Your neck pain is getting worse. ? · You are not getting better after 1 week. ? · You do not get better as expected. Where can you learn more? Go to http://idania-skip.info/. Enter [id number] in the search box to learn more about \"Neck Pain: Care Instructions. \" 
 Current as of: March 21, 2017 Content Version: 11.4 © 9550-1596 Healthwise, SmartCare system. Care instructions adapted under license by [employer] (which disclaims liability or warranty for this information). If you have questions about a medical condition or this instruction, always ask your healthcare professional. [address] any warranty or liability for your use of this information. Introducing [facility]! Dear Jeremiah Gitelman: Thank you for requesting a Chat[employer] account. Our records indicate that you already have an active Chat[employer] account. You can access your account anytime at [email or web address] Did you know that you can access your hospital and ER discharge instructions at any time in Chattering [employer]els? You can also review all of your test results from your hospital stay or ER visit. Additional Information If you have questions, please visit the Frequently Asked Questions section of the Chattering [employer] website at [email or web address] Remember, [employer]els is NOT to be used for urgent needs. For medical emergencies, dial 911. Now available from your iPhone and Android! Please provide this summary of care documentation to your next provider. Your primary care clinician is listed as Nicko Araiza. If you have any questions after today's visit, please call [phone].

## 2018-03-29 NOTE — PATIENT INSTRUCTIONS
It was a pleasure to see you! As discussed:    Anxiety  I have prescribed Zoloft. It may make your symptoms worse int the first 2 weeks before improving your symptoms. .    Recommended pt try finding a therapist using the list provided and www. [email or web address]. Jasmine Martell [address] [address] [facility] [address]  [facility]  [phone]    If you have any thoughts of harming yourself or others please seek immediate medical attention. If you have non urgent concerns, feel free to contact the office                  When should you call for help? Call 911 anytime you think you may need emergency care. For example, call if:  · You feel you cannot stop from hurting yourself or someone else. Call your doctor now or seek immediate medical care if:  · You hear voices. · You feel much more depressed. Post nasal drip  -Use nasal saline spray 3-4 times/day   -Start non sedating antihistamine such as Claritin, Allegra or Zyrtec (generic is fine) in the day;          Neck Pain: Care Instructions  Your Care Instructions  You can have neck pain anywhere from the bottom of your head to the top of your shoulders. It can spread to the upper back or arms. Injuries, painting a ceiling, sleeping with your neck twisted, staying in one position for too long, and many other activities can cause neck pain. Most neck pain gets better with home care. Your doctor may recommend medicine to relieve pain or relax your muscles. He or she may suggest exercise and physical therapy to increase flexibility and relieve stress. You may need to wear a special (cervical) collar to support your neck for a day or two. Follow-up care is a key part of your treatment and safety. Be sure to make and go to all appointments, and call your doctor if you are having problems. It's also a good idea to know your test results and keep a list of the medicines you take. How can you care for yourself at home?   · Try using a heating pad on a low or medium setting for 15 to 20 minutes every 2 or 3 hours. Try a warm shower in place of one session with the heating pad. · You can also try an ice pack for 10 to 15 minutes every 2 to 3 hours. Put a thin cloth between the ice and your skin. · Take pain medicines exactly as directed. ¨ If the doctor gave you a prescription medicine for pain, take it as prescribed. ¨ If you are not taking a prescription pain medicine, ask your doctor if you can take an over-the-counter medicine. · If your doctor recommends a cervical collar, wear it exactly as directed. When should you call for help? Call your doctor now or seek immediate medical care if:  ? · You have new or worsening numbness in your arms, buttocks or legs. ? · You have new or worsening weakness in your arms or legs. (This could make it hard to stand up.)   ? · You lose control of your bladder or bowels. ? Watch closely for changes in your health, and be sure to contact your doctor if:  ? · Your neck pain is getting worse. ? · You are not getting better after 1 week. ? · You do not get better as expected. Where can you learn more? Go to http://idania-skip.info/. Enter [id number] in the search box to learn more about \"Neck Pain: Care Instructions. \"  Current as of: March 21, 2017  Content Version: 11.4  © 4892-1878 [employer]. Care instructions adapted under license by [employer] (which disclaims liability or warranty for this information). If you have questions about a medical condition or this instruction, always ask your healthcare professional. [address] any warranty or liability for your use of this information.

## 2018-04-11 ENCOUNTER — HOSPITAL ENCOUNTER (OUTPATIENT)
Dept: ULTRASOUND IMAGING | Age: 78
Discharge: HOME | End: 2018-04-11
Attending: INTERNAL MEDICINE
Payer: MEDICARE

## 2018-04-11 DIAGNOSIS — R09.89 THROAT TIGHTNESS: ICD-10-CM

## 2018-04-11 PROCEDURE — 76536 US EXAM OF HEAD AND NECK: CPT

## 2018-04-28 LAB
BUN SERPL-MCNC: 12 MG/DL (ref 8–27)
BUN/CREAT SERPL: 16 (ref 12–28)
CHLORIDE SERPL-SCNC: 101 MMOL/L (ref 96–106)
CO2 SERPL-SCNC: 22 MMOL/L (ref 18–29)
CREAT SERPL-MCNC: 0.73 MG/DL (ref 0.57–1)
EST. AVERAGE GLUCOSE BLD GHB EST-MCNC: 163 MG/DL
GFR SERPLBLD CREATININE-BSD FMLA CKD-EPI: 79 ML/MIN/1.73
GFR SERPLBLD CREATININE-BSD FMLA CKD-EPI: 91 ML/MIN/1.73
GLUCOSE SERPL-MCNC: 163 MG/DL (ref 65–99)
HBA1C MFR BLD: 7.3 % (ref 4.8–5.6)
POTASSIUM SERPL-SCNC: 4.7 MMOL/L (ref 3.5–5.2)
SODIUM SERPL-SCNC: 145 MMOL/L (ref 134–144)
T4 FREE SERPL-MCNC: 1.35 NG/DL (ref 0.82–1.77)
TSH SERPL DL<=0.005 MIU/L-ACNC: 2.28 UIU/ML (ref 0.45–4.5)

## 2018-05-03 ENCOUNTER — OFFICE VISIT (OUTPATIENT)
Dept: ENDOCRINOLOGY | Age: 78
End: 2018-05-03

## 2018-05-03 VITALS
HEIGHT: 59 IN | HEART RATE: 82 BPM | DIASTOLIC BLOOD PRESSURE: 79 MMHG | WEIGHT: 169 LBS | BODY MASS INDEX: 34.07 KG/M2 | SYSTOLIC BLOOD PRESSURE: 149 MMHG | RESPIRATION RATE: 16 BRPM

## 2018-05-03 DIAGNOSIS — E11.9 CONTROLLED TYPE 2 DIABETES MELLITUS WITHOUT COMPLICATION, WITHOUT LONG-TERM CURRENT USE OF INSULIN (HCC): Primary | ICD-10-CM

## 2018-05-03 RX ORDER — HYDROCODONE BITARTRATE AND ACETAMINOPHEN 10; 325 MG/1; MG/1
TABLET ORAL
COMMUNITY
Start: 2017-12-06 | End: 2018-09-12

## 2018-05-03 RX ORDER — METFORMIN HYDROCHLORIDE 750 MG/1
750 TABLET, EXTENDED RELEASE ORAL 2 TIMES DAILY WITH MEALS
Qty: 60 TAB | Refills: 5 | Status: SHIPPED | OUTPATIENT
Start: 2018-05-03 | End: 2018-09-12 | Stop reason: SDUPTHER

## 2018-05-03 RX ORDER — AMLODIPINE BESYLATE 5 MG/1
5 TABLET ORAL
Refills: 12 | COMMUNITY
Start: 2018-04-20

## 2018-05-03 NOTE — PROGRESS NOTES
Endocrinology Visit    Chief Complaint: Type 2 diabetes    History of Present Illness: Silverio Hollins is a 76 y.o. female who returns for type 2 diabetes mellitus. I saw her in initial consultation in June 2017. A1c values have ranged from 6.5 to 7.3% on regimen of metformin and glipizide. At her last visit, I increased her evening metformin dose from 500 to 1000mg due to fasting hyperglycemia. Current glycemic medication regimen is metformin 500mg Qam and 1000mg Qpm plus glipizide 7.5mg Qam before breakfast and 5mg before dinner. A1c done on 4/27/18 returned at 7.3%, up from Nov value of 6.8%. Home blood glucose monitoring frequency: twice a day  Glucose range at home: 90s-230; fastings were higher in 190-lower 200s range two weeks ago when she was ill (congestion, throat pain). For the past two weeks her fastings have been in the 120-180 range. The patient has not had hypoglycemia recently. Does admit she has been less active over the winter, also stressed caring for her  and his health issues. She has no known complications from diabetes. Has cataracts but no diabetic retinopathy. Recently saw ENT and had a thyroid US due to throat discomfort - results were fairly normal.    Review of Systems as above, otherwise a 7 pt review is negative    Problem List:  Patient Active Problem List   Diagnosis Code    Diabetes mellitus type 2, controlled ([facility] [address] E11.9    Urge incontinence of urine N39.41    Atrial premature depolarization I49.1    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 9) E66.9    Chronic prescription benzodiazepine use Z79.899    Mitral valve prolapse, nonrheumatic I34.1    Osteoarthritis involving multiple joints on both sides of body M15.9       Past Medical History:    Past Medical History:   Diagnosis Date    Diabetes ([facility] [address]     adult-onset    IBS (irritable bowel syndrome)     Irregular heart beat     (PVCs)    MVP (mitral valve prolapse)     h/o (neg ECHO-3/06)       Past Surgical History:  Past Surgical History:   Procedure Laterality Date    HX HEENT      tonsilectomy    HX HYSTERECTOMY      partial Hysterectomy    HX ORTHOPAEDIC      left knee-arthroscopic    HX ORTHOPAEDIC      carpal tunnel    HX OTHER SURGICAL Left     wrist, middle finger-trigger        Social History:  Social History     Social History    Marital status:      Spouse name: N/A    Number of children: N/A    Years of education: N/A     Occupational History    Not on file. Social History Main Topics    Smoking status: Never Smoker    Smokeless tobacco: Never Used    Alcohol use 0.0 oz/week     0 Standard drinks or equivalent per week      Comment: occassionally    Drug use: No    Sexual activity: No     Other Topics Concern    Not on file     Social History Narrative       Family History:  Family History   Problem Relation Age of Onset    Heart Failure Father      CHF    Emphysema Father      (smoker)    COPD Father     Arthritis-osteo Mother     Arrhythmia Mother     Heart Attack Mother     Heart Attack Maternal Grandmother        Medications:     Current Outpatient Prescriptions:     amLODIPine (NORVASC) 5 mg tablet, TAKE ONE TABLET BY MOUTH ONE TIME DAILY, Disp: , Rfl: 12    glucose blood VI test strips (ONETOUCH ULTRA BLUE TEST STRIP) strip, 2 (two) times a day. test blood sugar, Disp: , Rfl:     sertraline (ZOLOFT) 25 mg tablet, Take 1 Tab by mouth daily. , Disp: 30 Tab, Rfl: 4    fexofenadine (ALLEGRA) 180 mg tablet, Take 1 Tab by mouth daily. , Disp: 30 Tab, Rfl: 4    metFORMIN (GLUCOPHAGE) 500 mg tablet, 500mg Qam, 1000mg Qpm, Disp: 270 Tab, Rfl: 3    Blood-Glucose Meter monitoring kit, Check BG 2 times/day; onetouch meter; E11.9, Disp: 1 Kit, Rfl: 0    glucose blood VI test strips (BLOOD GLUCOSE TEST) strip, Check BG 2 times/day, Disp: 100 Strip, Rfl: 11    glipiZIDE (GLUCOTROL) 5 mg tablet, Take 1 and half tabs with breakfast and 1 tab with dinner, Disp: 225 Tab, Rfl: 1    diclofenac (VOLTAREN) 1 % gel, Apply  to affected area four (4) times daily. , Disp: , Rfl:     fluticasone (FLONASE) 50 mcg/actuation nasal spray, 2 Sprays by Both Nostrils route daily. , Disp: 1 Bottle, Rfl: 0    cyanocobalamin (VITAMIN B-12) 500 mcg tablet, Take 500 mcg by mouth two (2) times a day., Disp: , Rfl:     finasteride (PROSCAR) 5 mg tablet, Take 5 mg by mouth daily. , Disp: , Rfl:     biotin 2,500 mcg tab, Take 2,500 Units by mouth., Disp: , Rfl:     Cholecalciferol, Vitamin D3, (VITAMIN D3) 1,000 unit cap, Take 1,000 Units by mouth., Disp: , Rfl:     MULTIVITAMIN PO, Take 1 Tab by mouth daily. , Disp: , Rfl:     NAPROXEN SODIUM (ALEVE PO), Take 220 mg by mouth daily as needed. , Disp: , Rfl:     aspirin delayed-release 81 mg tablet, Take  by mouth daily. Every other day, Disp: , Rfl:     LORazepam (ATIVAN) 0.5 mg tablet, Take 0.5 mg by mouth daily as needed. , Disp: , Rfl:     HYDROcodone-acetaminophen (NORCO)  mg tablet, TAKE ONE TABLET BY MOUTH EVERY 4 TO 6 HOURS AS NEEDED FOR PAIN, Disp: , Rfl:     amoxicillin (AMOXIL) 875 mg tablet, TAKE ONE TABLET BY MOUTH TWICE A DAY UNTIL GONE, Disp: , Rfl: 0    HYDROcodone-acetaminophen (NORCO)  mg tablet, TAKE ONE TABLET BY MOUTH EVERY 4 TO 6 HOURS AS NEEDED FOR PAIN, Disp: , Rfl: 0    amLODIPine (NORVASC) 2.5 mg tablet, Take 2.5 mg by mouth daily. Pt unsure of dosage, Disp: , Rfl:     Allergies: Allergies   Allergen Reactions    Januvia [Sitagliptin] Rash    B12 [Cyanocobalamin-Cobamamide] Hives     w/injection    Decongestant [Brompheniramine Maleate] Other (comments)     heart    Metrizamide Hives     w/injection    Brompheniramine Rash     heart       Physical Examination:  Blood pressure 149/79, pulse 82, resp.  rate 16, height 4' 11\" (1.499 m), weight 169 lb (76.7 kg). Gen: no acute distress  HEENT: mucous membranes moist  Thyroid: no enlargement or nodules noted, + submandibular fullness  CAD: normal rate, regular rhythm. No murmur rubs or gallops  PULM: clear to ausculation, no wheezes, rhonchis or rales. EXT: no clubbing, cyanosis or edema; PT pulses 2+  Neuro: grossly non focal, normal DTRs  Psych: pleasant, good insight into medical hx  Skin: warm, dry      Clinical Data Review:  Lab Results   Component Value Date/Time    Hemoglobin A1c 7.3 (H) 04/27/2018 10:33 AM    Hemoglobin A1c 6.8 (H) 11/16/2017 10:10 AM    Hemoglobin A1c 6.5 (H) 06/05/2017 04:04 PM      Lab Results   Component Value Date/Time    Sodium 145 (H) 04/27/2018 10:33 AM    Potassium 4.7 04/27/2018 10:33 AM    Chloride 101 04/27/2018 10:33 AM    CO2 22 04/27/2018 10:33 AM    Anion gap 11 02/02/2010 09:45 AM    Glucose 163 (H) 04/27/2018 10:33 AM    BUN 12 04/27/2018 10:33 AM    Creatinine 0.73 04/27/2018 10:33 AM    BUN/Creatinine ratio 16 04/27/2018 10:33 AM    GFR est AA 91 04/27/2018 10:33 AM    GFR est non-AA 79 04/27/2018 10:33 AM    Calcium 9.5 11/16/2017 10:10 AM     Lab Results   Component Value Date/Time    Microalb/Creat ratio (ug/mg creat.) 11.2 06/05/2017 04:04 PM     Lab Results   Component Value Date/Time    Cholesterol, total 171 11/16/2017 10:10 AM    HDL Cholesterol 62 11/16/2017 10:10 AM    LDL, calculated 89 11/16/2017 10:10 AM    VLDL, calculated 20 11/16/2017 10:10 AM    Triglyceride 101 11/16/2017 10:10 AM    CHOL/HDL Ratio 2.9 02/02/2010 09:45 AM     Thyroid US April 2018  FINDINGS:  The right thyroid lobe measures 4.2 x 1.3 x 1.6 cm. The left thyroid  lobe measures 4.7 x 1.7 x 1.3 cm. The isthmus measures 0.4 cm in AP thickness.     The thyroid gland is normal in size, echogenicity, and vascularity. A cyst or  hypoechoic solid nodule in the upper right thyroid lobe measures 0.3 cm.  There  are no suspicious thyroid nodules or calcification.     IMPRESSION:  Normal size of the thyroid gland without suspicious nodule. Assessment and Plan:  Patient is a 76 y.o. female here for type 2 diabetes, control of which is adequate goal based on fingerstick glucose data and A1c. We discussed that goal A1c for her is less than ~7.5%. Since she is having occasional GI side effects with regular metformin, will trial switching to XR form. If she has GI side effects with this, may consider increasing the evening glipizide instead. Glycemic Medication Changes:  - switch metformin to XR 750mg BID  - continue glipizide 7.5mg Qam, 5mg Qpm (30 min AC meals)  - notify me for BG<70    DM Health Maintenance: pertinent items updated in HM tab  A1c: update in 4 months before next visit  Cv/Lipids: not on statin, LDL is <100 and I don't want to risk statin s/e at thist lito  BP/Renal: BP above goal today, not on ACEi, microalbumin: repeat with next labs (if increasing and BP remains above goal, consider addition of ACEi at her next visit)  Vaccines: per PCP  Podiatry: no active issues, encouraged well-fitting footwear and daily inspection  Neuro: foot exam - update at next visit  Ophtho: yearly eye exam recommended  Diet and exercise: discussed healthy eating and exercise recommendations, particularly reduction in dietary carbohydrates    I spent 25 minutes with the patient today and > 50% of the time was spent counseling the patient about diabetes management including medication options and dietary modification. Patient verbalized an understanding and will return to clinic in 4 months. Thank you for the opportunity to participate in this patient's care.     Cole Mercedes MD  [facility]  [address] [place] [address]

## 2018-05-03 NOTE — PROGRESS NOTES
Lab Results   Component Value Date/Time    Hemoglobin A1c 7.3 (H) 04/27/2018 10:33 AM    Hemoglobin A1c 6.8 (H) 11/16/2017 10:10 AM    Hemoglobin A1c 6.5 (H) 06/05/2017 04:04 PM

## 2018-05-17 ENCOUNTER — HOSPITAL ENCOUNTER (OUTPATIENT)
Dept: GENERAL RADIOLOGY | Age: 78
Discharge: HOME | End: 2018-05-17
Attending: OTOLARYNGOLOGY
Payer: MEDICARE

## 2018-05-17 DIAGNOSIS — R13.14 DYSPHAGIA, PHARYNGOESOPHAGEAL PHASE: ICD-10-CM

## 2018-05-17 PROCEDURE — 74230 X-RAY XM SWLNG FUNCJ C+: CPT

## 2018-05-17 PROCEDURE — G8998 SWALLOW D/C STATUS: HCPCS | Performed by: SPEECH-LANGUAGE PATHOLOGIST

## 2018-05-17 PROCEDURE — G8997 SWALLOW GOAL STATUS: HCPCS | Performed by: SPEECH-LANGUAGE PATHOLOGIST

## 2018-05-17 PROCEDURE — 92611 MOTION FLUOROSCOPY/SWALLOW: CPT | Performed by: SPEECH-LANGUAGE PATHOLOGIST

## 2018-05-17 PROCEDURE — G8996 SWALLOW CURRENT STATUS: HCPCS | Performed by: SPEECH-LANGUAGE PATHOLOGIST

## 2018-05-17 NOTE — PROGRESS NOTES
[address]  [address]    Speech Pathology Modified barium swallow Study with cms g codes  Patient: Daniel Sunshine (38 y.o. female)  Date: 5/17/2018  Referring Provider:  Dr. Pipe Juan:   Patient alert, cooperative. Reports she gets a feeling of tightness bilaterally in her upper neck/jaw area that is worst with anxiety. Not specifically exacerbated by eating/drinking, but can occur during intake. ENT exam normal.  Pt reports possibly seeing psychiatry to address anxiety concerns. Reports symptoms have worsened over the last year since taking care of her  after a massive heart attack. MBS requested to rule out oropharyngeal dysphagia. OBJECTIVE:   Past Medical History:   Past Medical History:   Diagnosis Date    Diabetes ([address]     adult-onset    IBS (irritable bowel syndrome)     Irregular heart beat     (PVCs)    MVP (mitral valve prolapse)     h/o (neg ECHO-3/06)     Past Surgical History:   Procedure Laterality Date    HX HEENT      tonsilectomy    HX HYSTERECTOMY      partial Hysterectomy    HX ORTHOPAEDIC      left knee-arthroscopic    HX ORTHOPAEDIC      carpal tunnel    HX OTHER SURGICAL Left     wrist, middle finger-trigger      Current Dietary Status:  Regular   Radiologist: Dr. Carlito Steward: Lateral;Fluoro  Patient Position: upright in hausted chair   Trial 1:   Consistency Presented: Thin liquid;Puree; Solid   How Presented: Self-fed/presented;Cup/sip;Cup/gulp; Successive swallows;Straw;Spoon   Consistency Amount:  (300cc thin Ba, 1 tbsp Ba paste )   Bolus Acceptance: No impairment   Bolus Formation/Control: No impairment:     Propulsion: No impairment   Oral Residue: None   Initiation of Swallow: No impairment   Timing: No impairment   Penetration: None   Aspiration/Timing: No evidence of aspiration   Pharyngeal Clearance: Pyriform residue ; Less than 10% (inconsistently with thins only, cleared ) Decreased Tongue Base Retraction?: No  Laryngeal Elevation: WFL (within functional limits)  Aspiration/Penetration Score: 1 (No penetration or aspiration-Contrast does not enter the airway)  Pharyngeal Symmetry: Not assessed  Pharyngeal-Esophageal Segment: No impairment  Pharyngeal Dysfunction: None    Oral Phase Severity: No impairment  Pharyngeal Phase Severity: N/A    In compliance with CMSs Claims Based Outcome Reporting, the following G-code set was chosen for this patient based the use of the NOMS functional outcome to quantify this patient's level of swallowing impairment. Using the NOMS, the patient was determined to be at level 7 for swallow function which correlates with the CH= 0% level of severity. Based on the objective assessment provided within this note, the current, goal, and discharge g-codes are as follows:  Swallow  Swallowing:   Swallow Current Status CH= 0%   Swallow Goal Status CH= 0%   Swallow D/C Status CH= 0%    NOMS Swallowing Levels:  Level 1 (CN): NPO  Level 2 (CM): NPO but takes consistency in therapy  Level 3 (CL): Takes less than 50% of nutrition p.o. and continues with nonoral feedings; and/or safe with mod cues; and/or max diet restriction  Level 4 (CK): Safe swallow but needs mod cues; and/or mod diet restriction; and/or still requires some nonoral feeding/supplements  Level 5 (CJ): Safe swallow with min diet restriction; and/or needs min cues  Level 6 (CI): Independent with p.o.; rare cues; usually self cues; may need to avoid some foods or needs extra time  Level 7 ([facility]): Independent for all p.o.  ALISHA. (2003). National Outcomes Measurement System (NOMS): Adult Speech-Language Pathology User's Guide. ASSESSMENT :  Based on the objective data described above, the patient presents with a functional oropharyngeal swallow. Timely and complete mastication, timely swallow initiation and functional hyolaryngeal elevation/excursion.   Mildly reduced opening and duration of opening of the UES which resulted in occasional mild pyriform residue with thin liquids that cleared immediately with an additional swallow. Otherwise did not impede bolus flow. No penetration or aspiration observed. PLAN/RECOMMENDATIONS :  --continue regular diet. Further workup per MD      COMMUNICATION/EDUCATION:   The above findings and recommendations were discussed with: patient  who verbalized understanding. Thank you for this referral.  Nilda Coley M.CD.  CCC-SLP   Time Calculation: 15 mins

## 2018-06-21 RX ORDER — GLIPIZIDE 5 MG/1
TABLET ORAL
Qty: 225 TAB | Refills: 1 | Status: SHIPPED | OUTPATIENT
Start: 2018-06-21 | End: 2018-09-12 | Stop reason: SDUPTHER

## 2018-08-21 LAB
ALBUMIN SERPL-MCNC: 4.7 G/DL (ref 3.5–4.7)
ALBUMIN/CREAT UR: 11.3 MG/G CREAT (ref 0–30)
ALBUMIN/GLOB SERPL: 2.2 {RATIO} (ref 1.2–2.2)
ALP SERPL-CCNC: 76 IU/L (ref 39–117)
ALT SERPL-CCNC: 27 IU/L (ref 0–32)
AST SERPL-CCNC: 26 IU/L (ref 0–40)
BILIRUB SERPL-MCNC: 0.4 MG/DL (ref 0–1.2)
BUN SERPL-MCNC: 11 MG/DL (ref 8–27)
BUN/CREAT SERPL: 15 (ref 12–28)
CALCIUM SERPL-MCNC: 9.4 MG/DL (ref 8.7–10.3)
CHLORIDE SERPL-SCNC: 104 MMOL/L (ref 96–106)
CHOLEST SERPL-MCNC: 156 MG/DL (ref 100–199)
CO2 SERPL-SCNC: 23 MMOL/L (ref 20–29)
CREAT SERPL-MCNC: 0.71 MG/DL (ref 0.57–1)
CREAT UR-MCNC: 102.7 MG/DL
EST. AVERAGE GLUCOSE BLD GHB EST-MCNC: 148 MG/DL
GLOBULIN SER CALC-MCNC: 2.1 G/DL (ref 1.5–4.5)
GLUCOSE SERPL-MCNC: 149 MG/DL (ref 65–99)
HBA1C MFR BLD: 6.8 % (ref 4.8–5.6)
HDLC SERPL-MCNC: 54 MG/DL
LDLC SERPL CALC-MCNC: 80 MG/DL (ref 0–99)
MICROALBUMIN UR-MCNC: 11.6 UG/ML
POTASSIUM SERPL-SCNC: 5 MMOL/L (ref 3.5–5.2)
PROT SERPL-MCNC: 6.8 G/DL (ref 6–8.5)
SODIUM SERPL-SCNC: 142 MMOL/L (ref 134–144)
TRIGL SERPL-MCNC: 108 MG/DL (ref 0–149)
VLDLC SERPL CALC-MCNC: 22 MG/DL (ref 5–40)

## 2018-08-23 ENCOUNTER — OFFICE VISIT (OUTPATIENT)
Dept: INTERNAL MEDICINE CLINIC | Age: 78
End: 2018-08-23

## 2018-08-23 VITALS
HEIGHT: 59 IN | WEIGHT: 169.2 LBS | RESPIRATION RATE: 16 BRPM | BODY MASS INDEX: 34.11 KG/M2 | OXYGEN SATURATION: 97 % | HEART RATE: 79 BPM | TEMPERATURE: 98 F | DIASTOLIC BLOOD PRESSURE: 52 MMHG | SYSTOLIC BLOOD PRESSURE: 124 MMHG

## 2018-08-23 DIAGNOSIS — I49.1 ATRIAL PREMATURE DEPOLARIZATION: ICD-10-CM

## 2018-08-23 DIAGNOSIS — Z13.39 SCREENING FOR ALCOHOLISM: ICD-10-CM

## 2018-08-23 DIAGNOSIS — F06.4 ANXIETY DISORDER DUE TO GENERAL MEDICAL CONDITION: ICD-10-CM

## 2018-08-23 DIAGNOSIS — Z13.31 DEPRESSION SCREENING: ICD-10-CM

## 2018-08-23 DIAGNOSIS — Z00.00 MEDICARE ANNUAL WELLNESS VISIT, SUBSEQUENT: Primary | ICD-10-CM

## 2018-08-23 DIAGNOSIS — E11.9 CONTROLLED TYPE 2 DIABETES MELLITUS WITHOUT COMPLICATION, WITHOUT LONG-TERM CURRENT USE OF INSULIN (HCC): ICD-10-CM

## 2018-08-23 RX ORDER — SERTRALINE HYDROCHLORIDE 50 MG/1
50 TABLET, FILM COATED ORAL DAILY
Qty: 90 TAB | Refills: 2 | Status: SHIPPED | OUTPATIENT
Start: 2018-08-23 | End: 2019-05-13 | Stop reason: SDUPTHER

## 2018-08-23 RX ORDER — NITROGLYCERIN 0.4 MG/1
0.4 TABLET SUBLINGUAL
COMMUNITY

## 2018-08-23 NOTE — PROGRESS NOTES
HISTORY OF PRESENT ILLNESS  Michele Siu is a [de-identified] y.o. female. HPI  Diabetes Review:  The patient has diabetes and obesity. Followed by Dr. Ebony Burkitt, interval records reviewed. Diet and Lifestyle: does not rigorously follow a diabetic diet, nonsmoker  Home Glucose  Monitorin's AM fasting   Pertinent ROS: taking medications as instructed, no medication side effects noted, no TIA's, no chest pain on exertion, no dyspnea on exertion, no swelling of ankles. Cardiovascular Review:  The patient has MVP and  atrial premature depolarization followed by Dr. Sg Diggs. .  Diet and Lifestyle: does not rigorously follow a diabetic diet, nonsmoker  Home BP Monitoring: is not measured at home. Pertinent ROS: taking medications as instructed, no medication side effects noted, no TIA's, no chest pain on exertion, no dyspnea on exertion, no swelling of ankles. Anxiety  Patient is seen for anxiety disorder. Current treatment includes Zoloft , 2x/ week uses Lorazepam (from Dr. Sg Diggs) and no other therapies. Ongoing symptoms include: HENRI 7 score 10; excessive worrying. Improved with Zoloft. Patient denies: suicidal ideation. Reported side effects from the treatment: none. SHx:  had MI 2017 then TKA 2018. She has been procrastinating on her knee surgery. This is the Subsequent Medicare Annual Wellness Exam, performed 12 months or more after the Initial AWV or the last Subsequent AWV    I have reviewed the patient's medical history in detail and updated the computerized patient record.      History     Past Medical History:   Diagnosis Date    Diabetes ([address]     adult-onset    IBS (irritable bowel syndrome)     Irregular heart beat     (PVCs)    MVP (mitral valve prolapse)     h/o (neg ECHO-3/06)      Past Surgical History:   Procedure Laterality Date    HX HEENT      tonsilectomy    HX HYSTERECTOMY      partial Hysterectomy    HX ORTHOPAEDIC      left knee-arthroscopic    HX ORTHOPAEDIC carpal tunnel    HX OTHER SURGICAL Left     wrist, middle finger-trigger      Current Outpatient Prescriptions   Medication Sig Dispense Refill    nitroglycerin (NITROSTAT) 0.4 mg SL tablet 0.4 mg by SubLINGual route every five (5) minutes as needed for Chest Pain. Up to 3 doses.  glipiZIDE (GLUCOTROL) 5 mg tablet Take 1 and half tabs with breakfast and 1 tab with dinner 225 Tab 1    amLODIPine (NORVASC) 5 mg tablet TAKE ONE TABLET BY MOUTH ONE TIME DAILY  12    glucose blood VI test strips (ONETOUCH ULTRA BLUE TEST STRIP) strip 2 (two) times a day. test blood sugar      metFORMIN ER (GLUCOPHAGE XR) 750 mg tablet Take 1 Tab by mouth two (2) times daily (with meals). 60 Tab 5    sertraline (ZOLOFT) 25 mg tablet Take 1 Tab by mouth daily. 30 Tab 4    Blood-Glucose Meter monitoring kit Check BG 2 times/day; onetouch meter; E11.9 1 Kit 0    glucose blood VI test strips (BLOOD GLUCOSE TEST) strip Check BG 2 times/day 100 Strip 11    diclofenac (VOLTAREN) 1 % gel Apply  to affected area four (4) times daily.  fluticasone (FLONASE) 50 mcg/actuation nasal spray 2 Sprays by Both Nostrils route daily. 1 Bottle 0    cyanocobalamin (VITAMIN B-12) 500 mcg tablet Take 500 mcg by mouth two (2) times a day.  finasteride (PROSCAR) 5 mg tablet Take 5 mg by mouth daily.  biotin 2,500 mcg tab Take 10,000 mcg by mouth.  Cholecalciferol, Vitamin D3, (VITAMIN D3) 1,000 unit cap Take 1,000 Units by mouth.  MULTIVITAMIN PO Take 1 Tab by mouth daily.  NAPROXEN SODIUM (ALEVE PO) Take 220 mg by mouth daily as needed.  aspirin delayed-release 81 mg tablet Take 81 mg by mouth daily. Every other day      LORazepam (ATIVAN) 0.5 mg tablet Take 0.5 mg by mouth two (2) times a day.  HYDROcodone-acetaminophen (NORCO)  mg tablet TAKE ONE TABLET BY MOUTH EVERY 4 TO 6 HOURS AS NEEDED FOR PAIN      fexofenadine (ALLEGRA) 180 mg tablet Take 1 Tab by mouth daily.  30 Tab 4    amoxicillin (AMOXIL) 875 mg tablet TAKE ONE TABLET BY MOUTH TWICE A DAY UNTIL GONE  0    HYDROcodone-acetaminophen (NORCO)  mg tablet TAKE ONE TABLET BY MOUTH EVERY 4 TO 6 HOURS AS NEEDED FOR PAIN  0    amLODIPine (NORVASC) 2.5 mg tablet Take 2.5 mg by mouth daily. Pt unsure of dosage       Allergies   Allergen Reactions    Januvia [Sitagliptin] Rash    B12 [Cyanocobalamin-Cobamamide] Hives     w/injection    Decongestant [Brompheniramine Maleate] Other (comments)     heart    Metrizamide Hives     w/injection    Brompheniramine Rash     heart     Family History   Problem Relation Age of Onset    Heart Failure Father      CHF    Emphysema Father      (smoker)    COPD Father     Arthritis-osteo Mother     Arrhythmia Mother     Heart Attack Mother     Heart Attack Maternal Grandmother      Social History   Substance Use Topics    Smoking status: Never Smoker    Smokeless tobacco: Never Used    Alcohol use 0.0 oz/week     0 Standard drinks or equivalent per week      Comment: occassionally     Patient Active Problem List   Diagnosis Code    Diabetes mellitus type 2, controlled ([facility]) E11.9    Urge incontinence of urine N39.41    Atrial premature depolarization I49.1    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 9) E66.9    Chronic prescription benzodiazepine use Z79.899    Mitral valve prolapse, nonrheumatic I34.1    Osteoarthritis involving multiple joints on both sides of body M15.9       Depression Risk Factor Screening:     PHQ over the last two weeks 8/23/2018   PHQ Not Done -   Little interest or pleasure in doing things Not at all   Feeling down, depressed, irritable, or hopeless Not at all   Total Score PHQ 2 0     Alcohol Risk Factor Screening: You do not drink alcohol or very rarely.     Functional Ability and Level of Safety:   Hearing Loss  The patient needs further evaluation- reports difficulty understanding     Activities of Daily Living  The home contains: handrails and grab bars  Patient does total self care    Fall Risk  Fall Risk Assessment, last 12 mths 8/23/2018   Able to walk? Yes   Fall in past 12 months? No   Fall with injury? -   Number of falls in past 12 months -   Fall Risk Score -       Abuse Screen  Patient is not abused    Cognitive Screening   Evaluation of Cognitive Function:  Has your family/caregiver stated any concerns about your memory: no      Patient Care Team   Patient Care Team:  Abril Reed MD as PCP - General (Internal Medicine)  Ariel Covington MD (Ophthalmology)  Kamran Carlos MD (Orthopedic Surgery)  Carrol Mcghee MD (Cardiology)  Kristopher Vargas MD as Consulting Provider (Endocrinology)  [facility]     Physical Exam   Constitutional: She is oriented to person, place, and time. She appears well-developed and well-nourished. HENT:   Right Ear: External ear normal.   Left Ear: External ear normal.   Mouth/Throat: Oropharynx is clear and moist. No oropharyngeal exudate. Eyes: Conjunctivae are normal. No scleral icterus. Neck: Normal range of motion. Neck supple. No thyromegaly present. Cardiovascular: Normal rate, regular rhythm and normal heart sounds. Exam reveals no gallop and no friction rub. No murmur heard. Pulmonary/Chest: Effort normal and breath sounds normal. No respiratory distress. She has no wheezes. She has no rales. She exhibits no tenderness. Abdominal: Soft. Bowel sounds are normal. She exhibits no distension. There is no tenderness. There is no rebound and no guarding. Musculoskeletal: Normal range of motion. She exhibits no edema or tenderness. Neurological: She is alert and oriented to person, place, and time.        ASSESSMENT and PLAN    Assessment/Plan   Education and counseling provided:  Are appropriate based on today's review and evaluation  End-of-Life planning (with patient's consent)  Diagnoses and all orders for this visit:    1. Medicare annual wellness visit, subsequent- Health Maintenance reviewed -     2. Anxiety disorder due to general medical condition  -     sertraline (ZOLOFT) 50 mg tablet; Take 1 Tab by mouth daily. 3. Controlled type 2 diabetes mellitus without complication, without long-term current use of insulin ([facility])  -      DIABETES FOOT EXAM    4. Atrial premature depolarization- NSR on exam. Per cardiology     5. Depression screening- completed    6. Screening for alcoholism- completed    7. ACP updated. Will provide office with a copy. Follow-up Disposition:  Return in about 7 months (around 3/23/2019) for Follow-up, anxiety. Medication risks/benefits/costs/interactions/alternatives discussed with patient. Johnsie Bamberger  was given an after visit summary which includes diagnoses, current medications, & vitals. she expressed understanding with the diagnosis and plan.

## 2018-08-23 NOTE — ACP (ADVANCE CARE PLANNING)
Advance Care Planning (ACP) Provider Note - Comprehensive      Date of ACP Conversation: 08/23/18    Persons included in Conversation:  patient  Length of ACP Conversation in minutes:  16 minutes     Authorized Decision Maker (if patient is incapable of making informed decisions): This person is:  Wu Ring (1st), Naida Painter (2nd daughter)          General ACP for ALL Patients with Decision Making Capacity:   Importance of advance care planning, including choosing a healthcare agent to communicate patient's healthcare decisions if patient lost the ability to make decisions, such as after a sudden illness or accident     Review of Existing Advance Directive:   Will provide      For Serious or Chronic Illness:  Understanding of medical condition       Interventions Provided:  Full Code

## 2018-08-23 NOTE — PATIENT INSTRUCTIONS
It was a pleasure to see you! As discussed: We will increase your Zoloft to 50mg to see if this better controls you anxiety. Please let me know if you have side effects. We discussed advanced care planning. Please bring a copy of your Advanced Care Directive/ Living Will to the office. Diabetes Foot Health: Care Instructions  Your Care Instructions  When you have diabetes, your feet need extra care and attention. Diabetes can damage the nerve endings and blood vessels in your feet, making you less likely to notice when your feet are injured. Diabetes also limits your body's ability to fight infection and get blood to areas that need it. If you get a minor foot injury, it could become an ulcer or a serious infection. With good foot care, you can prevent most of these problems. Caring for your feet can be quick and easy. Most of the care can be done when you are bathing or getting ready for bed. Follow-up care is a key part of your treatment and safety. Be sure to make and go to all appointments, and call your doctor if you are having problems. It's also a good idea to know your test results and keep a list of the medicines you take. How can you care for yourself at home? · Keep your blood sugar close to normal by watching what and how much you eat, monitoring blood sugar, taking medicines if prescribed, and getting regular exercise. · Do not smoke. Smoking affects blood flow and can make foot problems worse. If you need help quitting, talk to your doctor about stop-smoking programs and medicines. These can increase your chances of quitting for good. · Eat a diet that is low in fats. High fat intake can cause fat to build up in your blood vessels and decrease blood flow. · Inspect your feet daily for blisters, cuts, cracks, or sores. If you cannot see well, use a mirror or have someone help you. · Take care of your feet:  [facility] your feet every day. Use warm (not hot) water.  Check the water temperature with your wrists or other part of your body, not your feet. ¨ Dry your feet well. Pat them dry. Do not rub the skin on your feet too hard. Dry well between your toes. If the skin on your feet stays moist, bacteria or a fungus can grow, which can lead to infection. ¨ Keep your skin soft. Use moisturizing skin cream to keep the skin on your feet soft and prevent calluses and cracks. But do not put the cream between your toes, and stop using any cream that causes a rash. ¨ Clean underneath your toenails carefully. Do not use a sharp object to clean underneath your toenails. Use the blunt end of a nail file or other rounded tool. ¨ Trim and file your toenails straight across to prevent ingrown toenails. Use a nail clipper, not scissors. Use an emery board to smooth the edges. · Change socks daily. Socks without seams are best, because seams often rub the feet. You can find socks for people with diabetes from specialty catalogs. · Look inside your shoes every day for things like gravel or torn linings, which could cause blisters or sores. · Buy shoes that fit well:  ¨ Look for shoes that have plenty of space around the toes. This helps prevent bunions and blisters. ¨ Try on shoes while wearing the kind of socks you will usually wear with the shoes. ¨ Avoid plastic shoes. They may rub your feet and cause blisters. Good shoes should be made of materials that are flexible and breathable, such as leather or cloth. ¨ Break in new shoes slowly by wearing them for no more than an hour a day for several days. Take extra time to check your feet for red areas, blisters, or other problems after you wear new shoes. · Do not go barefoot. Do not wear sandals, and do not wear shoes with very thin soles. Thin soles are easy to puncture. They also do not protect your feet from hot pavement or cold weather. · Have your doctor check your feet during each visit. If you have a foot problem, see your doctor.  Do not try to treat an early foot problem at home. Home remedies or treatments that you can buy without a prescription (such as corn removers) can be harmful. · Always get early treatment for foot problems. A minor irritation can lead to a major problem if not properly cared for early. When should you call for help? Call your doctor now or seek immediate medical care if:    · You have a foot sore, an ulcer or break in the skin that is not healing after 4 days, bleeding corns or calluses, or an ingrown toenail.     · You have blue or black areas, which can mean bruising or blood flow problems.     · You have peeling skin or tiny blisters between your toes or cracking or oozing of the skin.     · You have a fever for more than 24 hours and a foot sore.     · You have new numbness or tingling in your feet that does not go away after you move your feet or change positions.     · You have unexplained or unusual swelling of the foot or ankle.    Watch closely for changes in your health, and be sure to contact your doctor if:    · You cannot do proper foot care. Where can you learn more? Go to http://idania-skip.info/. Enter A739 in the search box to learn more about \"Diabetes Foot Health: Care Instructions. \"  Current as of: December 7, 2017  Content Version: 11.7  © 9133-5844 [employer]. Care instructions adapted under license by [employer] (which disclaims liability or warranty for this information). If you have questions about a medical condition or this instruction, always ask your healthcare professional. [place] [address] any warranty or liability for your use of this information. Anxiety Disorder: Care Instructions  Your Care Instructions  Anxiety is a normal reaction to stress. Difficult situations can cause you to have symptoms such as sweaty palms and a nervous feeling. In an anxiety disorder, the symptoms are far more severe.  Constant worry, muscle tension, trouble sleeping, nausea and diarrhea, and other symptoms can make normal daily activities difficult or impossible. These symptoms may occur for no reason, and they can affect your work, school, or social life. Medicines, counseling, and self-care can all help. Follow-up care is a key part of your treatment and safety. Be sure to make and go to all appointments, and call your doctor if you are having problems. It's also a good idea to know your test results and keep a list of the medicines you take. How can you care for yourself at home? · Take medicines exactly as directed. Call your doctor if you think you are having a problem with your medicine. · Go to your counseling sessions and follow-up appointments. · Recognize and accept your anxiety. Then, when you are in a situation that makes you anxious, say to yourself, \"This is not an emergency. I feel uncomfortable, but I am not in danger. I can keep going even if I feel anxious. \"  · Be kind to your body:  ¨ Relieve tension with exercise or a massage. ¨ Get enough rest.  ¨ Avoid alcohol, caffeine, nicotine, and illegal drugs. They can increase your anxiety level and cause sleep problems. ¨ Learn and do relaxation techniques. See below for more about these techniques. · Engage your mind. Get out and do something you enjoy. Go to a funny movie, or take a walk or hike. Plan your day. Having too much or too little to do can make you anxious. · Keep a record of your symptoms. Discuss your fears with a good friend or family member, or join a support group for people with similar problems. Talking to others sometimes relieves stress. · Get involved in social groups, or volunteer to help others. Being alone sometimes makes things seem worse than they are. · Get at least 30 minutes of exercise on most days of the week to relieve stress. Walking is a good choice.  You also may want to do other activities, such as running, swimming, cycling, or playing tennis or team sports. Relaxation techniques  Do relaxation exercises 10 to 20 minutes a day. You can play soothing, relaxing music while you do them, if you wish. · Tell others in your house that you are going to do your relaxation exercises. Ask them not to disturb you. · Find a comfortable place, away from all distractions and noise. · Lie down on your back, or sit with your back straight. · Focus on your breathing. Make it slow and steady. · Breathe in through your nose. Breathe out through either your nose or mouth. · Breathe deeply, filling up the area between your navel and your rib cage. Breathe so that your belly goes up and down. · Do not hold your breath. · Breathe like this for 5 to 10 minutes. Notice the feeling of calmness throughout your whole body. As you continue to breathe slowly and deeply, relax by doing the following for another 5 to 10 minutes:  · Tighten and relax each muscle group in your body. You can begin at your toes and work your way up to your head. · Imagine your muscle groups relaxing and becoming heavy. · Empty your mind of all thoughts. · Let yourself relax more and more deeply. · Become aware of the state of calmness that surrounds you. · When your relaxation time is over, you can bring yourself back to alertness by moving your fingers and toes and then your hands and feet and then stretching and moving your entire body. Sometimes people fall asleep during relaxation, but they usually wake up shortly afterward. · Always give yourself time to return to full alertness before you drive a car or do anything that might cause an accident if you are not fully alert. Never play a relaxation tape while you drive a car. When should you call for help? Call 911 anytime you think you may need emergency care.  For example, call if:    · You feel you cannot stop from hurting yourself or someone else.   Carlene Jalloh the numbers for these national suicide hotlines: 2-424-753-TALK (4-316-136-123.776.6342) and 4-714-CSORDOO (3-361-744-966.422.7817). If you or someone you know talks about suicide or feeling hopeless, get help right away.   Watch closely for changes in your health, and be sure to contact your doctor if:    · You have anxiety or fear that affects your life.     · You have symptoms of anxiety that are new or different from those you had before. Where can you learn more? Go to http://idania-skip.info/. Enter P754 in the search box to learn more about \"Anxiety Disorder: Care Instructions. \"  Current as of: December 7, 2017  Content Version: 11.7  © 8798-7558 [employer]. Care instructions adapted under license by [employer] (which disclaims liability or warranty for this information). If you have questions about a medical condition or this instruction, always ask your healthcare professional. [place] [address] any warranty or liability for your use of this information. Well Visit, Over 72: Care Instructions  Your Care Instructions    Physical exams can help you stay healthy. Your doctor has checked your overall health and may have suggested ways to take good care of yourself. He or she also may have recommended tests. At home, you can help prevent illness with healthy eating, regular exercise, and other steps. Follow-up care is a key part of your treatment and safety. Be sure to make and go to all appointments, and call your doctor if you are having problems. It's also a good idea to know your test results and keep a list of the medicines you take. How can you care for yourself at home? · Reach and stay at a healthy weight. This will lower your risk for many problems, such as obesity, diabetes, heart disease, and high blood pressure. · Get at least 30 minutes of exercise on most days of the week. Walking is a good choice.  You also may want to do other activities, such as running, swimming, cycling, or playing tennis or team sports. · Do not smoke. Smoking can make health problems worse. If you need help quitting, talk to your doctor about stop-smoking programs and medicines. These can increase your chances of quitting for good. · Protect your skin from too much sun. When you're outdoors from 10 a.m. to 4 p.m., stay in the shade or cover up with clothing and a hat with a wide brim. Wear sunglasses that block UV rays. Even when it's cloudy, put broad-spectrum sunscreen (SPF 30 or higher) on any exposed skin. · See a dentist one or two times a year for checkups and to have your teeth cleaned. · Wear a seat belt in the car. · Limit alcohol to 2 drinks a day for men and 1 drink a day for women. Too much alcohol can cause health problems. Follow your doctor's advice about when to have certain tests. These tests can spot problems early. For men and women  · Cholesterol. Your doctor will tell you how often to have this done based on your overall health and other things that can increase your risk for heart attack and stroke. · Blood pressure. Have your blood pressure checked during a routine doctor visit. Your doctor will tell you how often to check your blood pressure based on your age, your blood pressure results, and other factors. · Diabetes. Ask your doctor whether you should have tests for diabetes. · Vision. Experts recommend that you have yearly exams for glaucoma and other age-related eye problems. · Hearing. Tell your doctor if you notice any change in your hearing. You can have tests to find out how well you hear. · Colon cancer tests. Keep having colon cancer tests as your doctor recommends. You can have one of several types of tests. · Heart attack and stroke risk. At least every 4 to 6 years, you should have your risk for heart attack and stroke assessed.  Your doctor uses factors such as your age, blood pressure, cholesterol, and whether you smoke or have diabetes to show what your risk for a heart attack or stroke is over the next 10 years. · Osteoporosis. Talk to your doctor about whether you should have a bone density test to find out whether you have thinning bones. Also ask your doctor about whether you should take calcium and vitamin D supplements. For women  · Pap test and pelvic exam. You may no longer need a Pap test. Talk with your doctor about whether to stop or continue to have Pap tests. · Breast exam and mammogram. Ask how often you should have a mammogram, which is an X-ray of your breasts. A mammogram can spot breast cancer before it can be felt and when it is easiest to treat. · Thyroid disease. Talk to your doctor about whether to have your thyroid checked as part of a regular physical exam. Women have an increased chance of a thyroid problem. For men  · Prostate exam. Talk to your doctor about whether you should have a blood test (called a PSA test) for prostate cancer. Experts disagree on whether men should have this test. Some experts recommend that you discuss the benefits and risks of the test with your doctor. · Abdominal aortic aneurysm. Ask your doctor whether you should have a test to check for an aneurysm. You may need a test if you ever smoked or if your parent, brother, sister, or child has had an aneurysm. When should you call for help? Watch closely for changes in your health, and be sure to contact your doctor if you have any problems or symptoms that concern you. Where can you learn more? Go to http://idania-skip.info/. Enter Y805 in the search box to learn more about \"Well Visit, Over 65: Care Instructions. \"  Current as of: May 16, 2017  Content Version: 11.7  © 7586-5120 [employer]. Care instructions adapted under license by [employer] (which disclaims liability or warranty for this information).  If you have questions about a medical condition or this instruction, always ask your healthcare professional. Priscila Washington disclaims any warranty or liability for your use of this information.

## 2018-08-23 NOTE — MR AVS SNAPSHOT
[address] [facility] [address] [facility] 
[phone] Patient: Nick Miller MRN: [id number] :1940 Visit Information Date & Time Provider Department Dept. Phone Encounter #  
 2018  2:00 PM Haylee Sawyer MD [facility] [phone] [id number] Follow-up Instructions Return in about 7 months (around 3/23/2019) for Follow-up, anxiety. Your Appointments 2018  2:30 PM  
Follow Up with Albina Low MD  
[facility] [address] [place] [address]) Appt Note: f/u diabetes cp0.00; f/u diabetes cp0.00  
 [address] [facility] [address] [facility] [address]  
[phone]  
  
   
 [address] [facility] [address] [facility]  [id number]  
  
    
 10/3/2018  2:40 PM  
Any with Jocelyn Joy MD  
[address] [place] [address] ([address]) Appt Note: yearly PAP follow up; rs  
 [address] [facility] [address] [facility] [address]  
  
   
 [address] [facility] [address] [facility] [address] Upcoming Health Maintenance Date Due  
 EYE EXAM RETINAL OR DILATED Q1 2018 Influenza Age 5 to Adult 2018* HEMOGLOBIN A1C Q6M 2019 GLAUCOMA SCREENING Q2Y 2019 MICROALBUMIN Q1 2019 LIPID PANEL Q1 2019 FOOT EXAM Q1 2019 MEDICARE YEARLY EXAM 2019 DTaP/Tdap/Td series (2 - Td) 2025 *Topic was postponed. The date shown is not the original due date. Allergies as of 2018  Review Complete On: 2018 By: Haylee Sawyer MD  
  
 Severity Noted Reaction Type Reactions Januvia [Sitagliptin] Medium 2014   Side Effect Rash  
 B12 [Cyanocobalamin-cobamamide]  2011    Hives  
 w/injection Decongestant [Brompheniramine Maleate]  2011    Other (comments)  
 heart Metrizamide  2011    Hives  
 w/injection Brompheniramine Low 2011    Rash  
 heart Current Immunizations  Reviewed on 1/26/2017 Name Date Influenza High Dose Vaccine PF 10/15/2016, 10/31/2015 Influenza Vaccine 10/15/2014, 10/16/2013 Influenza Vaccine Whole 10/8/2010 Pneumococcal Conjugate (PCV-13) 10/31/2015 Pneumococcal Polysaccharide (PPSV-23) 1/26/2017 Tdap 9/14/2015 Zoster 12/1/2009 Not reviewed this visit You Were Diagnosed With   
  
 Codes Comments Medicare annual wellness visit, subsequent    -  Primary ICD-10-CM: Z00.00 ICD-9-CM: V70.0 Anxiety disorder due to general medical condition     ICD-10-CM: F06.4 ICD-9-CM: 293.84 Controlled type 2 diabetes mellitus without complication, without long-term current use of insulin ([facility]     ICD-10-CM: E11.9 ICD-9-CM: 250.00 Atrial premature depolarization     ICD-10-CM: I49.1 ICD-9-CM: 427.61 Depression screening     ICD-10-CM: Z13.89 ICD-9-CM: V79.0 Screening for alcoholism     ICD-10-CM: Z13.89 ICD-9-CM: V79.1 Vitals BP Pulse Temp Resp Height(growth percentile) Weight(growth percentile) 124/52 (BP 1 Location: Right arm, BP Patient Position: Sitting) 79 98 °F (36.7 °C) (Oral) 16 4' 11\" (1.499 m) 169 lb 3.2 oz (76.7 kg) SpO2 BMI OB Status Smoking Status 97% 34.17 kg/m2 Postmenopausal Never Smoker Vitals History BMI and BSA Data Body Mass Index Body Surface Area  
 34.17 kg/m 2 1.79 m 2 Preferred Pharmacy Pharmacy Name Phone [id number] [address] [facility] [phone] Your Updated Medication List  
  
   
This list is accurate as of 8/23/18  3:03 PM.  Always use your most recent med list.  
  
  
  
  
 Arvilla Shines Take 220 mg by mouth daily as needed. * amLODIPine 2.5 mg tablet Commonly known as:  Amy Gables Take 2.5 mg by mouth daily. Pt unsure of dosage * amLODIPine 5 mg tablet Commonly known as:  Amy Gables TAKE ONE TABLET BY MOUTH ONE TIME DAILY amoxicillin 875 mg tablet Commonly known as:  AMOXIL TAKE ONE TABLET BY MOUTH TWICE A DAY UNTIL GONE  
  
 aspirin delayed-release 81 mg tablet Take 81 mg by mouth daily. Every other day  
  
 biotin 2,500 mcg Tab Take 10,000 mcg by mouth. Blood-Glucose Meter monitoring kit Check BG 2 times/day; onetouch meter; E11.9  
  
 diclofenac 1 % Gel Commonly known as:  VOLTAREN Apply  to affected area four (4) times daily. fexofenadine 180 mg tablet Commonly known as:  Magda Chock Take 1 Tab by mouth daily. finasteride 5 mg tablet Commonly known as:  PROSCAR Take 5 mg by mouth daily. fluticasone 50 mcg/actuation nasal spray Commonly known as:  Valentina Fryer 2 Sprays by Both Nostrils route daily. glipiZIDE 5 mg tablet Commonly known as:  Dolph Siad Take 1 and half tabs with breakfast and 1 tab with dinner * ONETOUCH ULTRA BLUE TEST STRIP strip Generic drug:  glucose blood VI test strips 2 (two) times a day. test blood sugar * glucose blood VI test strips strip Commonly known as:  blood glucose test  
Check BG 2 times/day  
  
 * HYDROcodone-acetaminophen  mg tablet Commonly known as:  NORCO  
TAKE ONE TABLET BY MOUTH EVERY 4 TO 6 HOURS AS NEEDED FOR PAIN  
  
 * HYDROcodone-acetaminophen  mg tablet Commonly known as:  NORCO  
TAKE ONE TABLET BY MOUTH EVERY 4 TO 6 HOURS AS NEEDED FOR PAIN  
  
 LORazepam 0.5 mg tablet Commonly known as:  ATIVAN Take 0.5 mg by mouth two (2) times a day. metFORMIN  mg tablet Commonly known as:  GLUCOPHAGE XR Take 1 Tab by mouth two (2) times daily (with meals). MULTIVITAMIN PO Take 1 Tab by mouth daily. nitroglycerin 0.4 mg SL tablet Commonly known as:  NITROSTAT  
0.4 mg by SubLINGual route every five (5) minutes as needed for Chest Pain. Up to 3 doses. sertraline 50 mg tablet Commonly known as:  ZOLOFT Take 1 Tab by mouth daily. VITAMIN B-12 500 mcg tablet Generic drug:  cyanocobalamin Take 500 mcg by mouth two (2) times a day. VITAMIN D3 1,000 unit Cap Generic drug:  cholecalciferol Take 1,000 Units by mouth. * Notice: This list has 6 medication(s) that are the same as other medications prescribed for you. Read the directions carefully, and ask your doctor or other care provider to review them with you. Prescriptions Sent to Pharmacy Refills  
 sertraline (ZOLOFT) 50 mg tablet 2 Sig: Take 1 Tab by mouth daily. Class: Normal  
 Pharmacy: [id number] [place] at [address] [place] [address] #: [phone] Route: Oral  
  
We Performed the Following  DIABETES FOOT EXAM [[facility] Custom] Follow-up Instructions Return in about 7 months (around 3/23/2019) for Follow-up, anxiety. Patient Instructions It was a pleasure to see you! As discussed: We will increase your Zoloft to 50mg to see if this better controls you anxiety. Please let me know if you have side effects. We discussed advanced care planning. Please bring a copy of your Advanced Care Directive/ Living Will to the office. Diabetes Foot Health: Care Instructions Your Care Instructions When you have diabetes, your feet need extra care and attention. Diabetes can damage the nerve endings and blood vessels in your feet, making you less likely to notice when your feet are injured. Diabetes also limits your body's ability to fight infection and get blood to areas that need it. If you get a minor foot injury, it could become an ulcer or a serious infection. With good foot care, you can prevent most of these problems. Caring for your feet can be quick and easy. Most of the care can be done when you are bathing or getting ready for bed. Follow-up care is a key part of your treatment and safety.  Be sure to make and go to all appointments, and call your doctor if you are having problems. It's also a good idea to know your test results and keep a list of the medicines you take. How can you care for yourself at home? · Keep your blood sugar close to normal by watching what and how much you eat, monitoring blood sugar, taking medicines if prescribed, and getting regular exercise. · Do not smoke. Smoking affects blood flow and can make foot problems worse. If you need help quitting, talk to your doctor about stop-smoking programs and medicines. These can increase your chances of quitting for good. · Eat a diet that is low in fats. High fat intake can cause fat to build up in your blood vessels and decrease blood flow. · Inspect your feet daily for blisters, cuts, cracks, or sores. If you cannot see well, use a mirror or have someone help you. · Take care of your feet: 
[facility] [place] [facility] your feet every day. Use warm (not hot) water. Check the water temperature with your wrists or other part of your body, not your feet. ¨ Dry your feet well. Pat them dry. Do not rub the skin on your feet too hard. Dry well between your toes. If the skin on your feet stays moist, bacteria or a fungus can grow, which can lead to infection. ¨ Keep your skin soft. Use moisturizing skin cream to keep the skin on your feet soft and prevent calluses and cracks. But do not put the cream between your toes, and stop using any cream that causes a rash. ¨ Clean underneath your toenails carefully. Do not use a sharp object to clean underneath your toenails. Use the blunt end of a nail file or other rounded tool. ¨ Trim and file your toenails straight across to prevent ingrown toenails. Use a nail clipper, not scissors. Use an emery board to smooth the edges. · Change socks daily. Socks without seams are best, because seams often rub the feet. You can find socks for people with diabetes from specialty catalogs. · Look inside your shoes every day for things like gravel or torn linings, which could cause blisters or sores. · Buy shoes that fit well: 
¨ Look for shoes that have plenty of space around the toes. This helps prevent bunions and blisters. ¨ Try on shoes while wearing the kind of socks you will usually wear with the shoes. ¨ Avoid plastic shoes. They may rub your feet and cause blisters. Good shoes should be made of materials that are flexible and breathable, such as leather or cloth. ¨ Break in new shoes slowly by wearing them for no more than an hour a day for several days. Take extra time to check your feet for red areas, blisters, or other problems after you wear new shoes. · Do not go barefoot. Do not wear sandals, and do not wear shoes with very thin soles. Thin soles are easy to puncture. They also do not protect your feet from hot pavement or cold weather. · Have your doctor check your feet during each visit. If you have a foot problem, see your doctor. Do not try to treat an early foot problem at home. Home remedies or treatments that you can buy without a prescription (such as corn removers) can be harmful. · Always get early treatment for foot problems. A minor irritation can lead to a major problem if not properly cared for early. When should you call for help? Call your doctor now or seek immediate medical care if: 
  · You have a foot sore, an ulcer or break in the skin that is not healing after 4 days, bleeding corns or calluses, or an ingrown toenail.  
  · You have blue or black areas, which can mean bruising or blood flow problems.  
  · You have peeling skin or tiny blisters between your toes or cracking or oozing of the skin.  
  · You have a fever for more than 24 hours and a foot sore.  
  · You have new numbness or tingling in your feet that does not go away after you move your feet or change positions.  
  · You have unexplained or unusual swelling of the foot or ankle.  
 Watch closely for changes in your health, and be sure to contact your doctor if: 
  · You cannot do proper foot care. Where can you learn more? Go to http://idania-skip.info/. Enter A739 in the search box to learn more about \"Diabetes Foot Health: Care Instructions. \" Current as of: December 7, 2017 Content Version: 11.7 © 9692-5793 [employer]. Care instructions adapted under license by [employer] (which disclaims liability or warranty for this information). If you have questions about a medical condition or this instruction, always ask your healthcare professional. [address] any warranty or liability for your use of this information. Anxiety Disorder: Care Instructions Your Care Instructions Anxiety is a normal reaction to stress. Difficult situations can cause you to have symptoms such as sweaty palms and a nervous feeling. In an anxiety disorder, the symptoms are far more severe. Constant worry, muscle tension, trouble sleeping, nausea and diarrhea, and other symptoms can make normal daily activities difficult or impossible. These symptoms may occur for no reason, and they can affect your work, school, or social life. Medicines, counseling, and self-care can all help. Follow-up care is a key part of your treatment and safety. Be sure to make and go to all appointments, and call your doctor if you are having problems. It's also a good idea to know your test results and keep a list of the medicines you take. How can you care for yourself at home? · Take medicines exactly as directed. Call your doctor if you think you are having a problem with your medicine. · Go to your counseling sessions and follow-up appointments. · Recognize and accept your anxiety. Then, when you are in a situation that makes you anxious, say to yourself, \"This is not an emergency. I feel uncomfortable, but I am not in danger. I can keep going even if I feel anxious. \" · Be kind to your body: ¨ Relieve tension with exercise or a massage.  
¨ Get enough rest. 
 ¨ Avoid alcohol, caffeine, nicotine, and illegal drugs. They can increase your anxiety level and cause sleep problems. ¨ Learn and do relaxation techniques. See below for more about these techniques. · Engage your mind. Get out and do something you enjoy. Go to a funny movie, or take a walk or hike. Plan your day. Having too much or too little to do can make you anxious. · Keep a record of your symptoms. Discuss your fears with a good friend or family member, or join a support group for people with similar problems. Talking to others sometimes relieves stress. · Get involved in social groups, or volunteer to help others. Being alone sometimes makes things seem worse than they are. · Get at least 30 minutes of exercise on most days of the week to relieve stress. Walking is a good choice. You also may want to do other activities, such as running, swimming, cycling, or playing tennis or team sports. Relaxation techniques Do relaxation exercises 10 to 20 minutes a day. You can play soothing, relaxing music while you do them, if you wish. · Tell others in your house that you are going to do your relaxation exercises. Ask them not to disturb you. · Find a comfortable place, away from all distractions and noise. · Lie down on your back, or sit with your back straight. · Focus on your breathing. Make it slow and steady. · Breathe in through your nose. Breathe out through either your nose or mouth. · Breathe deeply, filling up the area between your navel and your rib cage. Breathe so that your belly goes up and down. · Do not hold your breath. · Breathe like this for 5 to 10 minutes. Notice the feeling of calmness throughout your whole body. As you continue to breathe slowly and deeply, relax by doing the following for another 5 to 10 minutes: · Tighten and relax each muscle group in your body. You can begin at your toes and work your way up to your head. · Imagine your muscle groups relaxing and becoming heavy. · Empty your mind of all thoughts. · Let yourself relax more and more deeply. · Become aware of the state of calmness that surrounds you. · When your relaxation time is over, you can bring yourself back to alertness by moving your fingers and toes and then your hands and feet and then stretching and moving your entire body. Sometimes people fall asleep during relaxation, but they usually wake up shortly afterward. · Always give yourself time to return to full alertness before you drive a car or do anything that might cause an accident if you are not fully alert. Never play a relaxation tape while you drive a car. When should you call for help? Call 911 anytime you think you may need emergency care. For example, call if: 
  · You feel you cannot stop from hurting yourself or someone else.  
Colby Zaidi the numbers for these national suicide hotlines: 7-300-249-TALK (0-630-049-125.150.5367) and 0-135-YYSLIPV (6-831.169.4629). If you or someone you know talks about suicide or feeling hopeless, get help right away. 
 Watch closely for changes in your health, and be sure to contact your doctor if: 
  · You have anxiety or fear that affects your life.  
  · You have symptoms of anxiety that are new or different from those you had before. Where can you learn more? Go to http://idania-skip.info/. Enter P754 in the search box to learn more about \"Anxiety Disorder: Care Instructions. \" Current as of: December 7, 2017 Content Version: 11.7 © 3872-5834 [employer]. Care instructions adapted under license by [employer] (which disclaims liability or warranty for this information). If you have questions about a medical condition or this instruction, always ask your healthcare professional. [place] [address] any warranty or liability for your use of this information. Well Visit, Over 72: Care Instructions Your Care Instructions Physical exams can help you stay healthy. Your doctor has checked your overall health and may have suggested ways to take good care of yourself. He or she also may have recommended tests. At home, you can help prevent illness with healthy eating, regular exercise, and other steps. Follow-up care is a key part of your treatment and safety. Be sure to make and go to all appointments, and call your doctor if you are having problems. It's also a good idea to know your test results and keep a list of the medicines you take. How can you care for yourself at home? · Reach and stay at a healthy weight. This will lower your risk for many problems, such as obesity, diabetes, heart disease, and high blood pressure. · Get at least 30 minutes of exercise on most days of the week. Walking is a good choice. You also may want to do other activities, such as running, swimming, cycling, or playing tennis or team sports. · Do not smoke. Smoking can make health problems worse. If you need help quitting, talk to your doctor about stop-smoking programs and medicines. These can increase your chances of quitting for good. · Protect your skin from too much sun. When you're outdoors from 10 a.m. to 4 p.m., stay in the shade or cover up with clothing and a hat with a wide brim. Wear sunglasses that block UV rays. Even when it's cloudy, put broad-spectrum sunscreen (SPF 30 or higher) on any exposed skin. · See a dentist one or two times a year for checkups and to have your teeth cleaned. · Wear a seat belt in the car. · Limit alcohol to 2 drinks a day for men and 1 drink a day for women. Too much alcohol can cause health problems. Follow your doctor's advice about when to have certain tests. These tests can spot problems early. For men and women · Cholesterol.  Your doctor will tell you how often to have this done based on your overall health and other things that can increase your risk for heart attack and stroke. · Blood pressure. Have your blood pressure checked during a routine doctor visit. Your doctor will tell you how often to check your blood pressure based on your age, your blood pressure results, and other factors. · Diabetes. Ask your doctor whether you should have tests for diabetes. · Vision. Experts recommend that you have yearly exams for glaucoma and other age-related eye problems. · Hearing. Tell your doctor if you notice any change in your hearing. You can have tests to find out how well you hear. · Colon cancer tests. Keep having colon cancer tests as your doctor recommends. You can have one of several types of tests. · Heart attack and stroke risk. At least every 4 to 6 years, you should have your risk for heart attack and stroke assessed. Your doctor uses factors such as your age, blood pressure, cholesterol, and whether you smoke or have diabetes to show what your risk for a heart attack or stroke is over the next 10 years. · Osteoporosis. Talk to your doctor about whether you should have a bone density test to find out whether you have thinning bones. Also ask your doctor about whether you should take calcium and vitamin D supplements. For women · Pap test and pelvic exam. You may no longer need a Pap test. Talk with your doctor about whether to stop or continue to have Pap tests. · Breast exam and mammogram. Ask how often you should have a mammogram, which is an X-ray of your breasts. A mammogram can spot breast cancer before it can be felt and when it is easiest to treat. · Thyroid disease. Talk to your doctor about whether to have your thyroid checked as part of a regular physical exam. Women have an increased chance of a thyroid problem. For men · Prostate exam. Talk to your doctor about whether you should have a blood test (called a PSA test) for prostate cancer.  Experts disagree on whether men should have this test. Some experts recommend that you discuss the benefits and risks of the test with your doctor. · Abdominal aortic aneurysm. Ask your doctor whether you should have a test to check for an aneurysm. You may need a test if you ever smoked or if your parent, brother, sister, or child has had an aneurysm. When should you call for help? Watch closely for changes in your health, and be sure to contact your doctor if you have any problems or symptoms that concern you. Where can you learn more? Go to http://idania-skip.info/. Enter S813 in the search box to learn more about \"Well Visit, Over 65: Care Instructions. \" Current as of: May 16, 2017 Content Version: 11.7 © 0841-4621 [employer] Care instructions adapted under license by [employer] (which disclaims liability or warranty for this information). If you have questions about a medical condition or this instruction, always ask your healthcare professional. [address] any warranty or liability for your use of this information. Introducing [facility] & HEALTH SERVICES! Dear Marsha Whittaker: Thank you for requesting a [employer] account. Our records indicate that you already have an active [employer] account. You can access your account anytime at [email or web address] Did you know that you can access your hospital and ER discharge instructions at any time in [employer]? You can also review all of your test results from your hospital stay or ER visit. Additional Information If you have questions, please visit the Frequently Asked Questions section of the [employer] website at [email or web address] Remember, [employer] is NOT to be used for urgent needs. For medical emergencies, dial 911. Now available from your iPhone and Android! Please provide this summary of care documentation to your next provider. Your primary care clinician is listed as Levy Martinez.  If you have any questions after today's visit, please call [phone].

## 2018-08-23 NOTE — PROGRESS NOTES
Chief Complaint   Patient presents with   [facility] Annual Wellness Visit     1. Have you been to the ER, urgent care clinic since your last visit? Hospitalized since your last visit? No    2. Have you seen or consulted any other health care providers outside of the [facility] since your last visit? Include any pap smears or colon screening. No    complains of anxiety, nervous and jittery    End of life planning discussed with patient. Patient states that they do not have an advance care plan at this time. Information has been provided in today's after visit summary with directions on how to contact our office to schedule an appointment with our nurse navigators for this service. Patient verbalized understanding.

## 2018-09-12 ENCOUNTER — OFFICE VISIT (OUTPATIENT)
Dept: ENDOCRINOLOGY | Age: 78
End: 2018-09-12

## 2018-09-12 VITALS
DIASTOLIC BLOOD PRESSURE: 78 MMHG | HEIGHT: 59 IN | RESPIRATION RATE: 16 BRPM | WEIGHT: 169 LBS | BODY MASS INDEX: 34.07 KG/M2 | HEART RATE: 83 BPM | SYSTOLIC BLOOD PRESSURE: 128 MMHG | OXYGEN SATURATION: 97 %

## 2018-09-12 DIAGNOSIS — E11.9 CONTROLLED TYPE 2 DIABETES MELLITUS WITHOUT COMPLICATION, WITHOUT LONG-TERM CURRENT USE OF INSULIN (HCC): Primary | ICD-10-CM

## 2018-09-12 RX ORDER — SERTRALINE HYDROCHLORIDE 25 MG/1
TABLET, FILM COATED ORAL
Refills: 4 | COMMUNITY
Start: 2018-07-25 | End: 2019-03-27 | Stop reason: SDUPTHER

## 2018-09-12 RX ORDER — METFORMIN HYDROCHLORIDE 750 MG/1
750 TABLET, EXTENDED RELEASE ORAL 2 TIMES DAILY WITH MEALS
Qty: 180 TAB | Refills: 3 | Status: SHIPPED | OUTPATIENT
Start: 2018-09-12

## 2018-09-12 RX ORDER — CLOBETASOL PROPIONATE 0.46 MG/ML
SOLUTION TOPICAL
Refills: 5 | COMMUNITY
Start: 2018-08-24 | End: 2020-10-14

## 2018-09-12 RX ORDER — GLIPIZIDE 5 MG/1
TABLET ORAL
Qty: 225 TAB | Refills: 3 | Status: SHIPPED | OUTPATIENT
Start: 2018-09-12 | End: 2021-03-09

## 2018-09-12 NOTE — Clinical Note
Everything looks pretty stable and at goal, so I advised a 1 yr f/u. If you feel she needs to return sooner, just let me know. Thanks!

## 2018-09-12 NOTE — MR AVS SNAPSHOT
[address] [facility] 202 1400 [address] [place] [address] 
[phone] Patient: Harlan Pathak MRN: [id number] :[id number] Visit Information Date & Time Provider Department Dept. Phone Encounter #  
 2018  2:30 PM Balbina Sanchez [facility] [place] [phone] Your Appointments 10/3/2018  2:40 PM  
Any with Alfredo Salmon MD  
[address] [facility] ([facility]) Appt Note: yearly PAP follow up; rs  
 [address] [facility] [address] [place] [address] [address]  
  
   
 [address] [facility] 6455 [facility] [id number] Upcoming Health Maintenance Date Due  
 EYE EXAM RETINAL OR DILATED Q1 2018 Influenza Age 5 to Adult 2018* HEMOGLOBIN A1C Q6M 2019 GLAUCOMA SCREENING Q2Y 2019 MICROALBUMIN Q1 2019 LIPID PANEL Q1 2019 FOOT EXAM Q1 2019 MEDICARE YEARLY EXAM 2019 DTaP/Tdap/Td series (2 - Td) 2025 *Topic was postponed. The date shown is not the original due date. Allergies as of 2018  Review Complete On: 2018 By: Esther Jaramillo Severity Noted Reaction Type Reactions Januvia [Sitagliptin] Medium 2014   Side Effect Rash  
 B12 [Cyanocobalamin-cobamamide]  2011    Hives  
 w/injection Decongestant [Brompheniramine Maleate]  2011    Other (comments)  
 heart Metrizamide  2011    Hives  
 w/injection Brompheniramine Low 2011    Rash  
 heart Current Immunizations  Reviewed on 2017 Name Date Influenza High Dose Vaccine PF 10/15/2016, 10/31/2015 Influenza Vaccine 10/15/2014, 10/16/2013 Influenza Vaccine Whole 10/8/2010 Pneumococcal Conjugate (PCV-13) 10/31/2015 Pneumococcal Polysaccharide (PPSV-23) 2017 Tdap 2015 Zoster 2009 Not reviewed this visit You Were Diagnosed With   
  
 Codes Comments Uncontrolled type 2 diabetes mellitus without complication, without long-term current use of insulin ([facility] 75.)     ICD-10-CM: E11.65 ICD-9-CM: 250.02 Vitals BP Pulse Resp Height(growth percentile) Weight(growth percentile) SpO2  
 128/78 83 16 4' 11\" (1.499 m) 169 lb (76.7 kg) 97% BMI OB Status Smoking Status 34.13 kg/m2 Postmenopausal Never Smoker Vitals History BMI and BSA Data Body Mass Index Body Surface Area  
 34.13 kg/m 2 1.79 m 2 Preferred Pharmacy Pharmacy Name Phone [id number] [facility] [phone] Your Updated Medication List  
  
   
This list is accurate as of 9/12/18  2:54 PM.  Always use your most recent med list.  
  
  
  
  
 Bayron Mann Take 220 mg by mouth daily as needed. * amLODIPine 2.5 mg tablet Commonly known as:  Ene Rodriguez Take 2.5 mg by mouth daily. Pt unsure of dosage * amLODIPine 5 mg tablet Commonly known as:  Ene Mendozaer TAKE ONE TABLET BY MOUTH ONE TIME DAILY  
  
 amoxicillin 875 mg tablet Commonly known as:  AMOXIL TAKE ONE TABLET BY MOUTH TWICE A DAY UNTIL GONE  
  
 aspirin delayed-release 81 mg tablet Take 81 mg by mouth daily. Every other day  
  
 biotin 2,500 mcg Tab Take 10,000 mcg by mouth. Blood-Glucose Meter monitoring kit Check BG 2 times/day; onetouch meter; E11.9 clobetasol 0.05 % external solution Commonly known as:  TEMOVATE  
USE ON SCALP 1-2 TIMES DAILY AS NEEDED  
  
 diclofenac 1 % Gel Commonly known as:  VOLTAREN Apply  to affected area four (4) times daily. fexofenadine 180 mg tablet Commonly known as:  Aviva Cyphers Take 1 Tab by mouth daily. finasteride 5 mg tablet Commonly known as:  PROSCAR Take 5 mg by mouth daily. fluticasone 50 mcg/actuation nasal spray Commonly known as:  Caffie Euler 2 Sprays by Both Nostrils route daily. glipiZIDE 5 mg tablet Commonly known as:  Antione Meuse Take 1 and half tabs with breakfast and 1 tab with dinner * ONETOUCH ULTRA BLUE TEST STRIP strip Generic drug:  glucose blood VI test strips 2 (two) times a day. test blood sugar * glucose blood VI test strips strip Commonly known as:  blood glucose test  
Check BG 2 times/day  
  
 * HYDROcodone-acetaminophen  mg tablet Commonly known as:  NORCO  
TAKE ONE TABLET BY MOUTH EVERY 4 TO 6 HOURS AS NEEDED FOR PAIN  
  
 * HYDROcodone-acetaminophen  mg tablet Commonly known as:  NORCO  
TAKE ONE TABLET BY MOUTH EVERY 4 TO 6 HOURS AS NEEDED FOR PAIN  
  
 LORazepam 0.5 mg tablet Commonly known as:  ATIVAN Take 0.5 mg by mouth two (2) times a day. metFORMIN  mg tablet Commonly known as:  GLUCOPHAGE XR Take 1 Tab by mouth two (2) times daily (with meals). MULTIVITAMIN PO Take 1 Tab by mouth daily. nitroglycerin 0.4 mg SL tablet Commonly known as:  NITROSTAT  
0.4 mg by SubLINGual route every five (5) minutes as needed for Chest Pain. Up to 3 doses. * sertraline 25 mg tablet Commonly known as:  ZOLOFT  
TAKE ONE TABLET BY MOUTH ONE TIME DAILY * sertraline 50 mg tablet Commonly known as:  ZOLOFT Take 1 Tab by mouth daily. VITAMIN B-12 500 mcg tablet Generic drug:  cyanocobalamin Take 500 mcg by mouth two (2) times a day. VITAMIN D3 1,000 unit Cap Generic drug:  cholecalciferol Take 1,000 Units by mouth. * Notice: This list has 8 medication(s) that are the same as other medications prescribed for you. Read the directions carefully, and ask your doctor or other care provider to review them with you. Prescriptions Sent to Pharmacy Refills  
 metFORMIN ER (GLUCOPHAGE XR) 750 mg tablet 3 Sig: Take 1 Tab by mouth two (2) times daily (with meals).   
 Class: Normal  
 Pharmacy: [id number] [place] [facility] [address] [facility], [address] [place] [address] [address] Ph #: [phone] Route: Oral  
 glipiZIDE (GLUCOTROL) 5 mg tablet 3 Sig: Take 1 and half tabs with breakfast and 1 tab with dinner Class: Normal  
 Pharmacy: [id number] [place] at [address] [place] [address] Ph #: [phone] Introducing [facility]! Dear Chiquita Seth: Thank you for requesting a [facility] account. Our records indicate that you already have an active [facility] account. You can access your account anytime at [email or web address] Did you know that you can access your hospital and ER discharge instructions at any time in [facility]? You can also review all of your test results from your hospital stay or ER visit. Additional Information If you have questions, please visit the Frequently Asked Questions section of the [facility] website at [email or web address] Remember, [facility] is NOT to be used for urgent needs. For medical emergencies, dial 911. Now available from your iPhone and Android! Please provide this summary of care documentation to your next provider. Your primary care clinician is listed as [facility] Alert. If you have any questions after today's visit, please call [phone].

## 2018-09-12 NOTE — PROGRESS NOTES
Lab Results   Component Value Date/Time    Hemoglobin A1c 6.8 (H) 08/20/2018 09:36 AM    Hemoglobin A1c 7.3 (H) 04/27/2018 10:33 AM    Hemoglobin A1c 6.8 (H) 11/16/2017 10:10 AM

## 2018-10-03 ENCOUNTER — OFFICE VISIT (OUTPATIENT)
Dept: SLEEP MEDICINE | Age: 78
End: 2018-10-03

## 2018-10-03 ENCOUNTER — DOCUMENTATION ONLY (OUTPATIENT)
Dept: SLEEP MEDICINE | Age: 78
End: 2018-10-03

## 2018-10-03 VITALS
OXYGEN SATURATION: 97 % | HEART RATE: 80 BPM | WEIGHT: 164 LBS | BODY MASS INDEX: 33.06 KG/M2 | SYSTOLIC BLOOD PRESSURE: 142 MMHG | HEIGHT: 59 IN | DIASTOLIC BLOOD PRESSURE: 80 MMHG

## 2018-10-03 DIAGNOSIS — E11.9 CONTROLLED TYPE 2 DIABETES MELLITUS WITHOUT COMPLICATION, WITHOUT LONG-TERM CURRENT USE OF INSULIN (HCC): ICD-10-CM

## 2018-10-03 DIAGNOSIS — G47.33 OBSTRUCTIVE SLEEP APNEA (ADULT) (PEDIATRIC): Primary | ICD-10-CM

## 2018-10-03 NOTE — PATIENT INSTRUCTIONS
[address] [facility] [place]. [place], [address]  Tel.  [phone]  Fax. 100 [facility] 60  [place], [address]  Tel.  [phone]  Fax. [phone] [address]  Tel.  [phone]  Fax. [phone]     PROPER SLEEP HYGIENE    What to avoid  · Do not have drinks with caffeine, such as coffee or black tea, for 8 hours before bed. · Do not smoke or use other types of tobacco near bedtime. Nicotine is a stimulant and can keep you awake. · Avoid drinking alcohol late in the evening, because it can cause you to wake in the middle of the night. · Do not eat a big meal close to bedtime. If you are hungry, eat a light snack. · Do not drink a lot of water close to bedtime, because the need to urinate may wake you up during the night. · Do not read or watch TV in bed. Use the bed only for sleeping and sexual activity. What to try  · Go to bed at the same time every night, and wake up at the same time every morning. Do not take naps during the day. · Keep your bedroom quiet, dark, and cool. · Get regular exercise, but not within 3 to 4 hours of your bedtime. .  · Sleep on a comfortable pillow and mattress. · If watching the clock makes you anxious, turn it facing away from you so you cannot see the time. · If you worry when you lie down, start a worry book. Well before bedtime, write down your worries, and then set the book and your concerns aside. · Try meditation or other relaxation techniques before you go to bed. · If you cannot fall asleep, get up and go to another room until you feel sleepy. Do something relaxing. Repeat your bedtime routine before you go to bed again. · Make your house quiet and calm about an hour before bedtime. Turn down the lights, turn off the TV, log off the computer, and turn down the volume on music. This can help you relax after a busy day.     Drowsy Driving  The [address] [place] [address] Road Traffic Safety Administration cites drowsiness as a causing factor in more than 769,662 police reported crashes annually, resulting in 76,000 injuries and 1,500 deaths. Other surveys suggest 55% of people polled have driven while drowsy in the past year, 23% had fallen asleep but not crashed, 3% crashed, and 2% had and accident due to drowsy driving. Who is at risk? Young Drivers: One study of drowsy driving accidents states that 55% of the drivers were under 25 years. Of those, 75% were male. Shift Workers and Travelers: People who work overnight or travel across time zones frequently are at higher risk of experiencing Circadian Rhythm Disorders. They are trying to work and function when their body is programed to sleep. Sleep Deprived: Lack of sleep has a serious impact on your ability to pay attention or focus on a task. Consistently getting less than the average of 8 hours your body needs creates partial or cumulative sleep deprivation. Untreated Sleep Disorders: Sleep Apnea, Narcolepsy, R.L.S., and other sleep disorders (untreated) prevent a person from getting enough restful sleep. This leads to excessive daytime sleepiness and increases the risk for drowsy driving accidents by up to 7 times. Medications / Alcohol: Even over the counter medications can cause drowsiness. Medications that impair a drivers attention should have a warning label. Alcohol naturally makes you sleepy and on its own can cause accidents. Combined with excessive drowsiness its effects are amplified. Signs of Drowsy Driving:   * You don't remember driving the last few miles   * You may drift out of your lucia   * You are unable to focus and your thoughts wander   * You may yawn more often than normal   * You have difficulty keeping your eyes open / nodding off   * Missing traffic signs, speeding, or tailgating  Prevention-   Good sleep hygiene, lifestyle and behavioral choices have the most impact on drowsy driving.  There is no substitute for sleep and the average person requires 8 hours nightly. If you find yourself driving drowsy, stop and sleep. Consider the sleep hygiene tips provided during your visit as well. Medication Refill Policy: Refills for all medications require 1 week advance notice. Please have your pharmacy fax a refill request. We are unable to fax, or call in \"controled substance\" medications and you will need to pick these prescriptions up from our office. [employer] Activation    Thank you for requesting access to [facility]. Please follow the instructions below to securely access and download your online medical record. [facility] allows you to send messages to your doctor, view your test results, renew your prescriptions, schedule appointments, and more. How Do I Sign Up? 1. In your internet browser, go to [email or web address]. 2. Click on the First Time User? Click Here link in the Sign In box. You will see the New Member Sign Up page. 3. Enter your [facility] [facility] Access Code exactly as it appears below. You will not need to use this code after youve completed the sign-up process. If you do not sign up before the expiration date, you must request a new code. [employer] Access Code: Activation code not generated  Current [employer] Status: Active (This is the date your [facility] access code will )    4. Enter the last four digits of your Social Security Number (xxxx) and Date of Birth (mm/dd/yyyy) as indicated and click Submit. You will be taken to the next sign-up page. 5. Create a [employer] ID. This will be your [facility] login ID and cannot be changed, so think of one that is secure and easy to remember. 6. Create a [employer] [facility] password. You can change your password at any time. 7. Enter your Password Reset Question and Answer. This can be used at a later time if you forget your password. 8. Enter your e-mail address. You will receive e-mail notification when new information is available in [address]   9. Click Sign Up. You can now view and download portions of your medical record. 10. Click the Download Summary menu link to download a portable copy of your medical information. Additional Information    If you have questions, please call [phone]. Remember, [facility] is NOT to be used for urgent needs. For medical emergencies, dial 911.

## 2018-10-03 NOTE — MR AVS SNAPSHOT
[address] [place] [address] 
[phone] Patient: Ramírez Form MRN: [id number] :1940 Visit Information Date & Time Provider Department Dept. Phone Encounter #  
 10/3/2018  2:40 PM Luis Alberto Sawyer MD [address] [address] [id number] Follow-up Instructions Return in about 1 year (around 10/3/2019). Follow-up and Disposition History Your Appointments 2019  2:30 PM  
Follow Up with Kathrine Jackson MD  
[facility]) Appt Note: f/u thyroid cp0.00  
 [address] [address]  
[phone]  
  
   
 [address] [facility] [address] Upcoming Health Maintenance Date Due Shingrix Vaccine Age 50> (1 of 2) 1988 EYE EXAM RETINAL OR DILATED Q1 2018 Influenza Age 5 to Adult 2018* HEMOGLOBIN A1C Q6M 2019 GLAUCOMA SCREENING Q2Y 2019 MICROALBUMIN Q1 2019 LIPID PANEL Q1 2019 FOOT EXAM Q1 2019 MEDICARE YEARLY EXAM 2019 DTaP/Tdap/Td series (2 - Td) 2025 *Topic was postponed. The date shown is not the original due date. Allergies as of 10/3/2018  Review Complete On: 10/3/2018 By: Chuck Gastelum Severity Noted Reaction Type Reactions Januvia [Sitagliptin] Medium 2014   Side Effect Rash  
 B12 [Cyanocobalamin-cobamamide]  2011    Hives  
 w/injection Decongestant [Brompheniramine Maleate]  2011    Other (comments)  
 heart Metrizamide  2011    Hives  
 w/injection Brompheniramine Low 2011    Rash  
 heart Current Immunizations  Reviewed on 2017 Name Date Influenza High Dose Vaccine PF 10/15/2016, 10/31/2015 Influenza Vaccine 10/15/2014, 10/16/2013 Influenza Vaccine Whole 10/8/2010 Pneumococcal Conjugate (PCV-13) 10/31/2015 Pneumococcal Polysaccharide (PPSV-23) 1/26/2017 Tdap 9/14/2015 Zoster 12/1/2009 Not reviewed this visit You Were Diagnosed With   
  
 Codes Comments Obstructive sleep apnea (adult) (pediatric)    -  Primary ICD-10-CM: G47.33 
ICD-9-CM: 327.23 Controlled type 2 diabetes mellitus without complication, without long-term current use of insulin ([facility]     ICD-10-CM: E11.9 ICD-9-CM: 250.00 Vitals BP Pulse Height(growth percentile) Weight(growth percentile) SpO2 BMI  
 142/80 (BP 1 Location: Right arm, BP Patient Position: Sitting) 80 4' 11\" (1.499 m) 164 lb (74.4 kg) 97% 33.12 kg/m2 OB Status Smoking Status Postmenopausal Never Smoker Vitals History BMI and BSA Data Body Mass Index Body Surface Area  
 33.12 kg/m 2 1.76 m 2 Preferred Pharmacy [facility] Name [id number] [address] [facility] [phone] Your Updated Medication List  
  
   
This list is accurate as of 10/3/18  3:15 PM.  Always use your most recent med list. amLODIPine 5 mg tablet Commonly known as:  Dalia Leaver TAKE ONE TABLET BY MOUTH ONE TIME DAILY  
  
 amoxicillin 875 mg tablet Commonly known as:  AMOXIL TAKE ONE TABLET BY MOUTH TWICE A DAY UNTIL GONE  
  
 aspirin delayed-release 81 mg tablet Take 81 mg by mouth daily. Every other day  
  
 biotin 2,500 mcg Tab Take 10,000 mcg by mouth. Blood-Glucose Meter monitoring kit Check BG 2 times/day; onetouch meter; E11.9 clobetasol 0.05 % external solution Commonly known as:  TEMOVATE  
USE ON SCALP 1-2 TIMES DAILY AS NEEDED  
  
 diclofenac 1 % Gel Commonly known as:  VOLTAREN Apply  to affected area four (4) times daily. fexofenadine 180 mg tablet Commonly known as:  Destinee Varghese Take 1 Tab by mouth daily. finasteride 5 mg tablet Commonly known as:  PROSCAR Take 5 mg by mouth daily. fluticasone 50 mcg/actuation nasal spray Commonly known as:  Maher Forts 2 Sprays by Both Nostrils route daily. glipiZIDE 5 mg tablet Commonly known as:  Kimo Aliec Take 1 and half tabs with breakfast and 1 tab with dinner * ONETOUCH ULTRA BLUE TEST STRIP strip Generic drug:  glucose blood VI test strips 2 (two) times a day. test blood sugar * glucose blood VI test strips strip Commonly known as:  blood glucose test  
Check BG 2 times/day LORazepam 0.5 mg tablet Commonly known as:  ATIVAN Take 0.5 mg by mouth two (2) times a day. metFORMIN  mg tablet Commonly known as:  GLUCOPHAGE XR Take 1 Tab by mouth two (2) times daily (with meals). MULTIVITAMIN PO Take 1 Tab by mouth daily. nitroglycerin 0.4 mg SL tablet Commonly known as:  NITROSTAT  
0.4 mg by SubLINGual route every five (5) minutes as needed for Chest Pain. Up to 3 doses. * sertraline 25 mg tablet Commonly known as:  ZOLOFT  
TAKE ONE TABLET BY MOUTH ONE TIME DAILY * sertraline 50 mg tablet Commonly known as:  ZOLOFT Take 1 Tab by mouth daily. VITAMIN B-12 500 mcg tablet Generic drug:  cyanocobalamin Take 500 mcg by mouth two (2) times a day. VITAMIN D3 1,000 unit Cap Generic drug:  cholecalciferol Take 1,000 Units by mouth. * Notice: This list has 4 medication(s) that are the same as other medications prescribed for you. Read the directions carefully, and ask your doctor or other care provider to review them with you. We Performed the Following AMB SUPPLY ORDER [[id number] Custom] Comments:  
 Fix or replace machine, humidifier not working Respironics Dreamstation APAP 5-10 cm H20 with heated humidifer [place], rachael access to sleep center Tubing (heated if necessary), filters, water resevoir, supplies Mask-fitting evaluation and mask per patient preference Length of need 99 
G47.33 
 
10-3-18 Arnulfo Devine Electronically Signed NPI # [id number] Follow-up Instructions Return in about 1 year (around 10/3/2019). Patient Instructions [address] [place]. [address] [place]  [place], [address] Tel.  [phone] Fax. 100 [facility] 60 [place], [address] Tel.  [phone] Fax. [phone] [address] Jess[place] Espinoza  Tel.  [phone] Fax. [phone] PROPER SLEEP HYGIENE What to avoid · Do not have drinks with caffeine, such as coffee or black tea, for 8 hours before bed. · Do not smoke or use other types of tobacco near bedtime. Nicotine is a stimulant and can keep you awake. · Avoid drinking alcohol late in the evening, because it can cause you to wake in the middle of the night. · Do not eat a big meal close to bedtime. If you are hungry, eat a light snack. · Do not drink a lot of water close to bedtime, because the need to urinate may wake you up during the night. · Do not read or watch TV in bed. Use the bed only for sleeping and sexual activity. What to try · Go to bed at the same time every night, and wake up at the same time every morning. Do not take naps during the day. · Keep your bedroom quiet, dark, and cool. · Get regular exercise, but not within 3 to 4 hours of your bedtime. Jaycob Inch · Sleep on a comfortable pillow and mattress. · If watching the clock makes you anxious, turn it facing away from you so you cannot see the time. · If you worry when you lie down, start a worry book. Well before bedtime, write down your worries, and then set the book and your concerns aside. · Try meditation or other relaxation techniques before you go to bed. · If you cannot fall asleep, get up and go to another room until you feel sleepy. Do something relaxing. Repeat your bedtime routine before you go to bed again. · Make your house quiet and calm about an hour before bedtime.  Turn down the lights, turn off the TV, log off the computer, and turn down the volume on music. This can help you relax after a busy day. Drowsy Driving The [employer] cites drowsiness as a causing factor in more than 770,475 police reported crashes annually, resulting in 76,000 injuries and 1,500 deaths. Other surveys suggest 55% of people polled have driven while drowsy in the past year, 23% had fallen asleep but not crashed, 3% crashed, and 2% had and accident due to drowsy driving. Who is at risk? Young Drivers: One study of drowsy driving accidents states that 55% of the drivers were under 25 years. Of those, 75% were male. Shift Workers and Travelers: People who work overnight or travel across time zones frequently are at higher risk of experiencing Circadian Rhythm Disorders. They are trying to work and function when their body is programed to sleep. Sleep Deprived: Lack of sleep has a serious impact on your ability to pay attention or focus on a task. Consistently getting less than the average of 8 hours your body needs creates partial or cumulative sleep deprivation. Untreated Sleep Disorders: Sleep Apnea, Narcolepsy, R.L.S., and other sleep disorders (untreated) prevent a person from getting enough restful sleep. This leads to excessive daytime sleepiness and increases the risk for drowsy driving accidents by up to 7 times. Medications / Alcohol: Even over the counter medications can cause drowsiness. Medications that impair a drivers attention should have a warning label. Alcohol naturally makes you sleepy and on its own can cause accidents. Combined with excessive drowsiness its effects are amplified. Signs of Drowsy Driving: * You don't remember driving the last few miles * You may drift out of your lucia * You are unable to focus and your thoughts wander  * You may yawn more often than normal 
 * You have difficulty keeping your eyes open / nodding off * Missing traffic signs, speeding, or tailgating Prevention-  
Good sleep hygiene, lifestyle and behavioral choices have the most impact on drowsy driving. There is no substitute for sleep and the average person requires 8 hours nightly. If you find yourself driving drowsy, stop and sleep. Consider the sleep hygiene tips provided during your visit as well. Medication Refill Policy: Refills for all medications require 1 week advance notice. Please have your pharmacy fax a refill request. We are unable to fax, or call in \"controled substance\" medications and you will need to pick these prescriptions up from our office. [employer] Activation Thank you for requesting access to [employer]. Please follow the instructions below to securely access and download your online medical record. [employer] allows you to send messages to your doctor, view your test results, renew your prescriptions, schedule appointments, and more. How Do I Sign Up? 1. In your internet browser, go to [email or web address] 2. Click on the First Time User? Click Here link in the Sign In box. You will see the New Member Sign Up page. 3. Enter your [employer] Access Code exactly as it appears below. You will not need to use this code after youve completed the sign-up process. If you do not sign up before the expiration date, you must request a new code. [employer] Access Code: Activation code not generated Current [employer] Status: Active (This is the date your Freebe[employer] access code will ) 4. Enter the last four digits of your Social Security Number (xxxx) and Date of Birth (mm/dd/yyyy) as indicated and click Submit. You will be taken to the next sign-up page. 5. Create a [employer] ID. This will be your [employer] login ID and cannot be changed, so think of one that is secure and easy to remember. 6. Create a [facility] password. You can change your password at any time. 7. Enter your Password Reset Question and Answer. This can be used at a later time if you forget your password. 8. Enter your e-mail address. You will receive e-mail notification when new information is available in [address] 9. Click Sign Up. You can now view and download portions of your medical record. 10. Click the Download Summary menu link to download a portable copy of your medical information. Additional Information If you have questions, please call [phone]. Remember, [facility] is NOT to be used for urgent needs. For medical emergencies, dial 911. Introducing [facility]! Dear Betsey Miller: Thank you for requesting a [facility] account. Our records indicate that you already have an active [facility] account. You can access your account anytime at [email or web address] Did you know that you can access your hospital and ER discharge instructions at any time in [facility]? You can also review all of your test results from your hospital stay or ER visit. Additional Information If you have questions, please visit the Frequently Asked Questions section of the [facility] website at [email or web address] Remember, [facility] is NOT to be used for urgent needs. For medical emergencies, dial 911. Now available from your iPhone and Android! Please provide this summary of care documentation to your next provider. Your primary care clinician is listed as Shanda Patiño. If you have any questions after today's visit, please call [phone].

## 2018-10-03 NOTE — PROGRESS NOTES
217 [facility] [place]. [place], [address]  Tel.  [phone]  Fax. 100 [facility] 60  [place] [facility], [address]  Tel.  [phone]  Fax. [phone] [address] [place] Espinoza  Tel.  [phone]  Fax. [phone]     S>Della Partida is a [de-identified] y.o. female seen for a positive airway pressure follow-up. She reports no problems using the device. The following problems are identified:    Drowsiness no Problems exhaling no   Snoring no Forget to put on no   Mask Comfortable yes Can't fall asleep no   Dry Mouth Yes, water level not going down Mask falls off no   Air Leaking no Frequent awakenings no     Download reviewed. She admits that her sleep has improved. Therapy Apnea Index averaged over PAP use: 2 /hr which reflects improved sleep breathing condition. Allergies   Allergen Reactions    Januvia [Sitagliptin] Rash    B12 [Cyanocobalamin-Cobamamide] Hives     w/injection    Decongestant [Brompheniramine Maleate] Other (comments)     heart    Metrizamide Hives     w/injection    Brompheniramine Rash     heart       She has a current medication list which includes the following prescription(s): sertraline, clobetasol, metformin er, glipizide, nitroglycerin, sertraline, amlodipine, glucose blood vi test strips, fexofenadine, blood-glucose meter, glucose blood vi test strips, diclofenac, fluticasone, cyanocobalamin, finasteride, biotin, cholecalciferol, multivitamin, aspirin delayed-release, lorazepam, and amoxicillin. .      She  has a past medical history of Diabetes ([address] IBS (irritable bowel syndrome); Irregular heart beat; and MVP (mitral valve prolapse). [place] Sleepiness Score: 6   and Modified F.O.S.Q. Score Total / 2: 18   which reflect improved sleep quality over therapy time.     O>    Visit Vitals    /80 (BP 1 Location: Right arm, BP Patient Position: Sitting)    Pulse 80    Ht 4' 11\" (1.499 m)    Wt 164 lb (74.4 kg)  SpO2 97%    BMI 33.12 kg/m2           General:   Alert, oriented, not in distress   Neck:   No JVD    Chest/Lungs:  symetrical lung expansion , no accessory muscle use    Extremities:  no obvious rashes , negative edema    Neuro:  No focal deficits ; No obvious tremor    Psych:  Normal affect ,  Normal countenance ;         A>    ICD-10-CM ICD-9-CM    1. Obstructive sleep apnea (adult) (pediatric) G47.33 327.23 AMB SUPPLY ORDER   2. Controlled type 2 diabetes mellitus without complication, without long-term current use of insulin ([facility]) E11.9 250.00      AHI = 16(2015). On CPAP :  5-10 cmH2O. Compliant:      yes    Therapeutic Response:  Positive    P>      * Follow-up Disposition:  Return in about 1 year (around 10/3/2019). PAP therapy is effective and remains necessary for treatment of sleep apnea    * She was asked to contact our office for any problems regarding PAP therapy. * Counseling was provided regarding the importance of regular PAP use and on proper sleep hygiene and safe driving. * Re-enforced proper and regular cleaning for the device. I have counseled the patient regarding the benefits of weight loss. 2. Type II diabetes - she continues on her current regimen. I have reviewed the relationship between sleep disordered breathing as it relates to diabetes.     Electronically signed by    Sonia Johnson MD  Diplomate in Sleep Medicine  [facility]

## 2018-10-05 ENCOUNTER — DOCUMENTATION ONLY (OUTPATIENT)
Dept: SLEEP MEDICINE | Age: 78
End: 2018-10-05

## 2018-12-18 ENCOUNTER — TELEPHONE (OUTPATIENT)
Dept: INTERNAL MEDICINE CLINIC | Age: 78
End: 2018-12-18

## 2018-12-18 NOTE — TELEPHONE ENCOUNTER
Patient states right ankle swelling, rash with itching x 1 week. She complains of pain only when skin tightens from swelling. She has diabetes, followed by Dr Tavo Morris. Nasal congestion, headache. Offered the [facility]. Patient scheduled for acute visit 12/19/2018 @ 10:15am

## 2018-12-19 ENCOUNTER — OFFICE VISIT (OUTPATIENT)
Dept: INTERNAL MEDICINE CLINIC | Age: 78
End: 2018-12-19

## 2018-12-19 ENCOUNTER — CLINICAL SUPPORT (OUTPATIENT)
Dept: CARDIOLOGY CLINIC | Age: 78
End: 2018-12-19

## 2018-12-19 ENCOUNTER — TELEPHONE (OUTPATIENT)
Dept: INTERNAL MEDICINE CLINIC | Age: 78
End: 2018-12-19

## 2018-12-19 VITALS
RESPIRATION RATE: 16 BRPM | BODY MASS INDEX: 34.11 KG/M2 | TEMPERATURE: 98.1 F | HEART RATE: 94 BPM | HEIGHT: 59 IN | SYSTOLIC BLOOD PRESSURE: 156 MMHG | WEIGHT: 169.2 LBS | OXYGEN SATURATION: 97 % | DIASTOLIC BLOOD PRESSURE: 74 MMHG

## 2018-12-19 DIAGNOSIS — M79.89 RIGHT LEG SWELLING: Primary | ICD-10-CM

## 2018-12-19 DIAGNOSIS — I87.2 VENOUS STASIS DERMATITIS OF RIGHT LOWER EXTREMITY: ICD-10-CM

## 2018-12-19 DIAGNOSIS — R51.9 SINUS HEADACHE: ICD-10-CM

## 2018-12-19 RX ORDER — TRIAMCINOLONE ACETONIDE 1 MG/G
CREAM TOPICAL 2 TIMES DAILY
Qty: 45 G | Refills: 0 | Status: SHIPPED | OUTPATIENT
Start: 2018-12-19 | End: 2020-10-14

## 2018-12-19 NOTE — PROGRESS NOTES
HPI:  Betsey Miller is a [de-identified]y.o. year old female who returns to clinic today for an acute visit:   She reports new swelling in right leg and itchy rash that started 5 days ago. She has been scratching the rash. She states she usually does not have swelling in her right leg but does get swelling in her left leg. Has a distant history many years ago of a left DVT. She also complains of HA x 1 month and has felt was sinus related and is a dull ache. She takes aleve for her right knee pain which may help her headache. She has not been taking her Allegra recently. She does have a history of allergies. Has had some nasal congestion. No colored nasal drainage. Current Outpatient Medications   Medication Sig Dispense Refill    glucose blood VI test strips (ONETOUCH ULTRA BLUE TEST STRIP) strip Check blood sugar twice a day E11.9 150 Strip 3    sertraline (ZOLOFT) 25 mg tablet TAKE ONE TABLET BY MOUTH ONE TIME DAILY  4    metFORMIN ER (GLUCOPHAGE XR) 750 mg tablet Take 1 Tab by mouth two (2) times daily (with meals). 180 Tab 3    glipiZIDE (GLUCOTROL) 5 mg tablet Take 1 and half tabs with breakfast and 1 tab with dinner 225 Tab 3    nitroglycerin (NITROSTAT) 0.4 mg SL tablet 0.4 mg by SubLINGual route every five (5) minutes as needed for Chest Pain. Up to 3 doses.  sertraline (ZOLOFT) 50 mg tablet Take 1 Tab by mouth daily. 90 Tab 2    amLODIPine (NORVASC) 5 mg tablet TAKE ONE TABLET BY MOUTH ONE TIME DAILY  12    Blood-Glucose Meter monitoring kit Check BG 2 times/day; onetouch meter; E11.9 1 Kit 0    glucose blood VI test strips (BLOOD GLUCOSE TEST) strip Check BG 2 times/day 100 Strip 11    biotin 2,500 mcg tab Take 10,000 mcg by mouth.  Cholecalciferol, Vitamin D3, (VITAMIN D3) 1,000 unit cap Take 1,000 Units by mouth.  MULTIVITAMIN PO Take 1 Tab by mouth daily.  aspirin delayed-release 81 mg tablet Take 81 mg by mouth daily.  Every other day      clobetasol (TEMOVATE) 0.05 % external solution USE ON SCALP 1-2 TIMES DAILY AS NEEDED  5    fexofenadine (ALLEGRA) 180 mg tablet Take 1 Tab by mouth daily. 30 Tab 4    amoxicillin (AMOXIL) 875 mg tablet TAKE ONE TABLET BY MOUTH TWICE A DAY UNTIL GONE  0    diclofenac (VOLTAREN) 1 % gel Apply  to affected area four (4) times daily.  fluticasone (FLONASE) 50 mcg/actuation nasal spray 2 Sprays by Both Nostrils route daily. 1 Bottle 0    cyanocobalamin (VITAMIN B-12) 500 mcg tablet Take 500 mcg by mouth two (2) times a day.  finasteride (PROSCAR) 5 mg tablet Take 5 mg by mouth daily.  LORazepam (ATIVAN) 0.5 mg tablet Take 0.5 mg by mouth two (2) times a day. Allergies   Allergen Reactions    Januvia [Sitagliptin] Rash    B12 [Cyanocobalamin-Cobamamide] Hives     w/injection    Decongestant [Brompheniramine Maleate] Other (comments)     heart    Metrizamide Hives     w/injection    Brompheniramine Rash     heart     Social History     Tobacco Use    Smoking status: Never Smoker    Smokeless tobacco: Never Used   Substance Use Topics    Alcohol use: Yes     Alcohol/week: 0.0 oz     Comment: occassionally         Review of Systems   HENT: Negative for sore throat. Respiratory: Negative for shortness of breath. Cardiovascular: Positive for leg swelling. Negative for chest pain. Gastrointestinal: Negative for abdominal pain. Physical Exam   Constitutional: She appears well-developed. No distress. HENT:   Head: Normocephalic and atraumatic. Mouth/Throat: Oropharynx is clear and moist.   Mild nasal congestion, sinuses nontender. Cardiovascular: Normal rate, regular rhythm and intact distal pulses. Murmur heard. Systolic murmur is present with a grade of 1/6. Pulmonary/Chest: Effort normal and breath sounds normal. She has no wheezes. Abdominal: Soft. Bowel sounds are normal. She exhibits no distension and no mass. There is no tenderness.    Musculoskeletal: She exhibits edema (Right lower extremity 1+ anterior pretibial edema. Calf nontender. No heat. Negative Homans sign. Left lower extremity 1+ anterior pretibial edema. ). Lymphadenopathy:     She has no cervical adenopathy. Skin:   Right anterior pretibial scattered erythematous papule with some mild erythema. Psychiatric: She has a normal mood and affect. Nursing note and vitals reviewed. Assessment & Plan:    ICD-10-CM ICD-9-CM    1. Right leg swelling  We will rule out DVT but suspect related to venous insufficiency. Uncertain what has caused recent flare. She does have compression stockings at home and recommend that she start using these on both legs and keeping them elevated. Avoid salt. She is on amlodipine but no recent change in dose so unclear whether this may be contributing to lower extremity swelling. Continue amlodipine for now and if swelling not improving may want to consider changing to a different medication. M79.89 729.81 DUPLEX LOWER EXT VENOUS RIGHT   2. Venous stasis dermatitis of right lower extremity  Start triamcinolone cream twice daily to right lower extremity. I87.2 454.1    3. Sinus headache  Restart Allegra daily and if symptoms are not improving follow-up. R51 784.0      Orders Placed This Encounter    DUPLEX LOWER EXT VENOUS RIGHT    triamcinolone acetonide (KENALOG) 0.1 % topical cream          Follow-up Disposition: Not on File   Advised her to call back or return to office if symptoms worsen/change/persist.  Discussed expected course/resolution/complications of diagnosis in detail with patient. Medication risks/benefits/costs/interactions/alternatives discussed with patient. She was given an after visit summary which includes diagnoses, current medications, & vitals. She expressed understanding with the diagnosis and plan.

## 2018-12-19 NOTE — TELEPHONE ENCOUNTER
Received call from Nneka Padilla at [facility]. Patient ID verified name and . Nneka Padilla reports doppler right leg is negative and note with results are in patient chart.

## 2018-12-19 NOTE — PROGRESS NOTES
Appointment scheduled at [address] [facility] office at 1:00 today for Right leg doppler per Dr Monalisa Flowers written order.

## 2018-12-19 NOTE — PROCEDURES
[facility]  *** FINAL REPORT ***    Name: Patti Villela  MRN: [id number]       Outpatient  : 1940  HIS Order #: [id number]  [id number] [facility] Visit #: [id number]  Date: 19 Dec 2018    TYPE OF TEST: Peripheral Venous Testing    REASON FOR TEST  Limb swelling    Right Leg:-  Deep venous thrombosis:           No  Superficial venous thrombosis:    No  Deep venous insufficiency:        Not examined  Superficial venous insufficiency: Not examined      INTERPRETATION/FINDINGS  PROCEDURE:  Evaluation of right lower extremity veins with ultrasound  (grayscale, pulsed Doppler and color flow imaging). Includes  assessment of the common femoral, deep femoral, femoral, popliteal,  peroneal, and great saphenous veins. Other veins, for example the  gastrocnemius and soleal veins, may also be visualized. FINDINGS:  Grayscale, and color flow duplex images of the veins in the   right lower extremity demonstrate normal compressibility, spontaneous   and augmented flow profiles, and absence of filling defects. CONCLUSION:  1)  No evidence of deep or superficial venous thrombosis within the  right lower extremity. 2)  The left common femoral vein is thrombus free. 3)  As an incidental finding, several lymph nodes were visualized  within the right groin. ADDITIONAL COMMENTS      Preliminary findings were telephoned to Rikik Lund at [facility] at 1:35 pm on 2018. I have personally reviewed the data relevant to the interpretation of  this  study.     TECHNOLOGIST: Sheila Machuca RVT, RDMS, RDCS  Signed: 2018 01:34 PM    PHYSICIAN: Jeannie Blanco MD  Signed: 2018 05:49 PM

## 2018-12-19 NOTE — PROGRESS NOTES
Chief Complaint   Patient presents with    Leg Swelling     right    Ankle swelling     right    Headache     head congestion    Rash     right leg     Patient states she is here for headache with head congestion. Patient also states right lower leg swelling and right ankle swelling with rash on right lower leg.

## 2018-12-19 NOTE — TELEPHONE ENCOUNTER
Notify patient that the lower extremity ultrasound is negative for any evidence of blood clot. Continue with plan of wearing compression stockings, elevating leg, steroid cream twice a day to the rash on her leg. As we discussed at her visit.

## 2019-01-11 ENCOUNTER — OFFICE VISIT (OUTPATIENT)
Dept: URGENT CARE | Age: 79
End: 2019-01-11

## 2019-01-11 VITALS
HEART RATE: 76 BPM | OXYGEN SATURATION: 95 % | BODY MASS INDEX: 33.47 KG/M2 | SYSTOLIC BLOOD PRESSURE: 160 MMHG | RESPIRATION RATE: 20 BRPM | TEMPERATURE: 97.6 F | DIASTOLIC BLOOD PRESSURE: 74 MMHG | HEIGHT: 59 IN | WEIGHT: 166 LBS

## 2019-01-11 DIAGNOSIS — B96.89 ACUTE BACTERIAL SINUSITIS: Primary | ICD-10-CM

## 2019-01-11 DIAGNOSIS — J01.90 ACUTE BACTERIAL SINUSITIS: Primary | ICD-10-CM

## 2019-01-11 DIAGNOSIS — R09.82 POST-NASAL DRIP: ICD-10-CM

## 2019-01-11 RX ORDER — DOXYCYCLINE 100 MG/1
100 CAPSULE ORAL 2 TIMES DAILY
Qty: 14 CAP | Refills: 0 | Status: SHIPPED | OUTPATIENT
Start: 2019-01-11 | End: 2019-01-18

## 2019-01-11 NOTE — PATIENT INSTRUCTIONS
Lungs are clear today; likely coming from your sinuses  Follow up with Primary Care doctor without improvement over 1 week       Sinusitis: Care Instructions  Your Care Instructions    Sinusitis is an infection of the lining of the sinus cavities in your head. Sinusitis often follows a cold. It causes pain and pressure in your head and face. In most cases, sinusitis gets better on its own in 1 to 2 weeks. But some mild symptoms may last for several weeks. Sometimes antibiotics are needed. Follow-up care is a key part of your treatment and safety. Be sure to make and go to all appointments, and call your doctor if you are having problems. It's also a good idea to know your test results and keep a list of the medicines you take. How can you care for yourself at home? · Take an over-the-counter pain medicine, such as acetaminophen (Tylenol), ibuprofen (Advil, Motrin), or naproxen (Aleve). Read and follow all instructions on the label. · If the doctor prescribed antibiotics, take them as directed. Do not stop taking them just because you feel better. You need to take the full course of antibiotics. · Be careful when taking over-the-counter cold or flu medicines and Tylenol at the same time. Many of these medicines have acetaminophen, which is Tylenol. Read the labels to make sure that you are not taking more than the recommended dose. Too much acetaminophen (Tylenol) can be harmful. · Breathe warm, moist air from a steamy shower, a hot bath, or a sink filled with hot water. Avoid cold, dry air. Using a humidifier in your home may help. Follow the directions for cleaning the machine. · Use saline (saltwater) nasal washes to help keep your nasal passages open and wash out mucus and bacteria. You can buy saline nose drops at a grocery store or drugstore. Or you can make your own at home by adding 1 teaspoon of salt and 1 teaspoon of baking soda to 2 cups of distilled water.  If you make your own, fill a bulb syringe with the solution, insert the tip into your nostril, and squeeze gently. Galilea Shames your nose. · Put a hot, wet towel or a warm gel pack on your face 3 or 4 times a day for 5 to 10 minutes each time. · Try a decongestant nasal spray like oxymetazoline (Afrin). Do not use it for more than 3 days in a row. Using it for more than 3 days can make your congestion worse. When should you call for help? Call your doctor now or seek immediate medical care if:    · You have new or worse swelling or redness in your face or around your eyes.     · You have a new or higher fever.    Watch closely for changes in your health, and be sure to contact your doctor if:    · You have new or worse facial pain.     · The mucus from your nose becomes thicker (like pus) or has new blood in it.     · You are not getting better as expected. Where can you learn more? Go to http://idania-skip.info/. Enter R236 in the search box to learn more about \"Sinusitis: Care Instructions. \"  Current as of: March 28, 2018  Content Version: 11.8  © 1329-2956 [employer]. Care instructions adapted under license by [employer] (which disclaims liability or warranty for this information). If you have questions about a medical condition or this instruction, always ask your healthcare professional. [address] any warranty or liability for your use of this information.

## 2019-01-11 NOTE — PROGRESS NOTES
Here for sinus infection and cough  1 month of sinus pain and pressure, post nasal drip and cough  Saw pcp and advised supportive therapy +allegra  Has been taking with minimal relief  \"now heading to my chest\" no SOB, pleuritic pain, SOB, dizziness, fevers or chills  Normal energy and appetite  Overall not improving. Past Medical History:   Diagnosis Date    Diabetes ([address]     adult-onset    IBS (irritable bowel syndrome)     Irregular heart beat     (PVCs)    MVP (mitral valve prolapse)     h/o (neg ECHO-3/06)        Past Surgical History:   Procedure Laterality Date    HX HEENT      tonsilectomy    HX HYSTERECTOMY      partial Hysterectomy    HX ORTHOPAEDIC      left knee-arthroscopic    HX ORTHOPAEDIC      carpal tunnel    HX OTHER SURGICAL Left     wrist, middle finger-trigger          Family History   Problem Relation Age of Onset    Heart Failure Father         CHF    Emphysema Father         (smoker)    COPD Father     Arthritis-osteo Mother     Arrhythmia Mother     Heart Attack Mother     Heart Attack Maternal Grandmother         Social History     Socioeconomic History    Marital status:      Spouse name: Not on file    Number of children: Not on file    Years of education: Not on file    Highest education level: Not on file   Social Needs    Financial resource strain: Not on file    Food insecurity - worry: Not on file    Food insecurity - inability: Not on file   [employer] needs - medical: Not on file   [employer] needs - non-medical: Not on file   Occupational History    Not on file   Tobacco Use    Smoking status: Never Smoker    Smokeless tobacco: Never Used   Substance and Sexual Activity    Alcohol use:  Yes     Alcohol/week: 0.0 oz     Comment: occassionally    Drug use: No    Sexual activity: No   Other Topics Concern    Not on file   Social History Narrative    Not on file                ALLERGIES: Januvia [sitagliptin]; B12 [cyanocobalamin-cobamamide]; Decongestant [brompheniramine maleate]; Metrizamide; and Brompheniramine    Review of Systems   Constitutional: Negative for fever. Respiratory: Negative for chest tightness, shortness of breath, wheezing and stridor. Neurological: Negative for dizziness, weakness and headaches. All other systems reviewed and are negative. Vitals:    01/11/19 1611   BP: 160/74   Pulse: 76   Resp: 20   Temp: 97.6 °F (36.4 °C)   SpO2: 95%   Weight: 166 lb (75.3 kg)   Height: 4' 11\" (1.499 m)       Physical Exam   Constitutional: She is oriented to person, place, and time. No distress. Non-toxic appearing, well hydrated   HENT:   Mouth/Throat: No oropharyngeal exudate. Decreased nasal patency bilat with some post nasal drip on phaynx wall. No swelling or exudate. TMs normal.   Eyes: Conjunctivae and EOM are normal. Pupils are equal, round, and reactive to light. No scleral icterus. Neck: Normal range of motion. Neck supple. Cardiovascular: Normal rate and regular rhythm. Exam reveals no gallop and no friction rub. Murmur heard. Pulmonary/Chest: Effort normal and breath sounds normal. No respiratory distress. She has no wheezes. She has no rales. Good air movement throughout   Lymphadenopathy:     She has no cervical adenopathy. Neurological: She is alert and oriented to person, place, and time. Skin: Skin is warm and dry. No rash noted. She is not diaphoretic. No erythema. No pallor. Psychiatric: She has a normal mood and affect. Her behavior is normal. Thought content normal.   Nursing note and vitals reviewed. [facility]     Differential Diagnosis; Clinical Impression; Plan:       CLINICAL IMPRESSION:  (J01.90,  B96.89) Acute bacterial sinusitis  (primary encounter diagnosis)  (R09.82) Post-nasal drip    Orders Placed This Encounter      doxycycline (VIBRAMYCIN) 100 mg capsule      Plan:  1. Continue allegra. Start doxycycline antibiotic. 2. Humidified air.  Soothing fluids  3. Review handouts  4. See PCP without improvement over next 1 week      We have reviewed concerning signs/symptoms, normal vs abnormal progression of medical condition and when to seek immediate medical attention. Schedule with PCP or Urgent Care immediately for worsening or new symptoms.               Procedures

## 2019-03-27 ENCOUNTER — OFFICE VISIT (OUTPATIENT)
Dept: INTERNAL MEDICINE CLINIC | Age: 79
End: 2019-03-27

## 2019-03-27 VITALS
WEIGHT: 167.8 LBS | SYSTOLIC BLOOD PRESSURE: 140 MMHG | BODY MASS INDEX: 33.83 KG/M2 | HEIGHT: 59 IN | DIASTOLIC BLOOD PRESSURE: 76 MMHG | TEMPERATURE: 99 F | HEART RATE: 90 BPM | RESPIRATION RATE: 16 BRPM | OXYGEN SATURATION: 98 %

## 2019-03-27 DIAGNOSIS — E11.9 TYPE 2 DIABETES MELLITUS WITHOUT COMPLICATION, WITHOUT LONG-TERM CURRENT USE OF INSULIN (HCC): ICD-10-CM

## 2019-03-27 DIAGNOSIS — F41.9 ANXIETY: ICD-10-CM

## 2019-03-27 DIAGNOSIS — Z86.718 HISTORY OF DVT (DEEP VEIN THROMBOSIS): ICD-10-CM

## 2019-03-27 DIAGNOSIS — G47.30 SLEEP APNEA, UNSPECIFIED TYPE: ICD-10-CM

## 2019-03-27 DIAGNOSIS — Z01.818 PRE-OP EVALUATION: Primary | ICD-10-CM

## 2019-03-27 RX ORDER — CHOLECALCIFEROL (VITAMIN D3) 125 MCG
1 CAPSULE ORAL
COMMUNITY

## 2019-03-27 NOTE — PROGRESS NOTES
HISTORY OF PRESENT ILLNESS  William Thomas is a [de-identified] y.o. female. HPI  William Thomas presents for preop evaluation for Left total knee replacement: Patient has a history of DVT, Type II Diabetes Mellitus, Anxiety, Sleep Apnea on CPAP, and Osteoarthritis,  Surgeon :Dr. Jama Biggs  Type of surgery :Left Total Knee Replacement  Surgery site : Left Knee  Anesthesia type: General  Date of procedure:  4/10/19  Last HgB A1C 7.3% 2/27/19  Allergies: Allergies   Allergen Reactions    Januvia [Sitagliptin] Rash    B12 [Cyanocobalamin-Cobamamide] Hives     w/injection    Decongestant [Brompheniramine Maleate] Other (comments)     heart    Metrizamide Hives     w/injection    Brompheniramine Rash     heart     Latex allergy: no    Past Medical History:   Diagnosis Date    Diabetes [address]     adult-onset    DVT, lower extremity (HCC)     IBS (irritable bowel syndrome)     Irregular heart beat     (PVCs)    MVP (mitral valve prolapse)     h/o (neg ECHO-3/06)    Thromboembolus ([address]      . H/O APCs, VPCs    Past Surgical History:   Procedure Laterality Date    HX HEENT      tonsilectomy    HX HYSTERECTOMY      partial Hysterectomy    HX ORTHOPAEDIC      left knee-arthroscopic    HX ORTHOPAEDIC      carpal tunnel    HX OTHER SURGICAL Left     wrist, middle finger-trigger      Social History     Tobacco Use    Smoking status: Never Smoker    Smokeless tobacco: Never Used   Substance Use Topics    Alcohol use: Yes     Alcohol/week: 0.0 oz     Comment: occassionally    Drug use: No     Current Outpatient Medications on File Prior to Visit   Medication Sig Dispense Refill    naproxen sodium (ALEVE) 220 mg cap Take  by mouth.  glucose blood VI test strips (ONETOUCH ULTRA BLUE TEST STRIP) strip Check blood sugar twice a day E11.9 150 Strip 3    triamcinolone acetonide (KENALOG) 0.1 % topical cream Apply  to affected area two (2) times a day.  use thin layer 45 g 0    metFORMIN ER (GLUCOPHAGE XR) 750 mg tablet Take 1 Tab by mouth two (2) times daily (with meals). 180 Tab 3    glipiZIDE (GLUCOTROL) 5 mg tablet Take 1 and half tabs with breakfast and 1 tab with dinner 225 Tab 3    sertraline (ZOLOFT) 50 mg tablet Take 1 Tab by mouth daily. 90 Tab 2    amLODIPine (NORVASC) 5 mg tablet TAKE ONE TABLET BY MOUTH ONE TIME DAILY  12    Blood-Glucose Meter monitoring kit Check BG 2 times/day; onetouch meter; E11.9 1 Kit 0    glucose blood VI test strips (BLOOD GLUCOSE TEST) strip Check BG 2 times/day 100 Strip 11    cyanocobalamin (VITAMIN B-12) 500 mcg tablet Take 500 mcg by mouth two (2) times a day.  biotin 2,500 mcg tab Take 10,000 mcg by mouth.  Cholecalciferol, Vitamin D3, (VITAMIN D3) 1,000 unit cap Take 1,000 Units by mouth.  MULTIVITAMIN PO Take 1 Tab by mouth daily.  aspirin delayed-release 81 mg tablet Take 81 mg by mouth daily. Every other day      clobetasol (TEMOVATE) 0.05 % external solution USE ON SCALP 1-2 TIMES DAILY AS NEEDED  5    nitroglycerin (NITROSTAT) 0.4 mg SL tablet 0.4 mg by SubLINGual route every five (5) minutes as needed for Chest Pain. Up to 3 doses.  fexofenadine (ALLEGRA) 180 mg tablet Take 1 Tab by mouth daily. 30 Tab 4    diclofenac (VOLTAREN) 1 % gel Apply  to affected area four (4) times daily.  fluticasone (FLONASE) 50 mcg/actuation nasal spray 2 Sprays by Both Nostrils route daily. 1 Bottle 0     No current facility-administered medications on file prior to visit. Review of Systems   Constitutional: Negative for chills, fever and weight loss. HENT: Negative for sore throat. Sinus congestion/chronic     Respiratory: Negative for cough, shortness of breath and wheezing. Cardiovascular: Negative for chest pain and leg swelling. Chronic lifelong palpitations  Evaluated by Dr. Micah Willingham Cardiology   Gastrointestinal: Negative for abdominal pain, blood in stool, heartburn, melena, nausea and vomiting.         Occasional diarrhea Genitourinary: Negative for dysuria and frequency. Musculoskeletal: Positive for joint pain. Neurological: Negative for sensory change, focal weakness and weakness. Psychiatric/Behavioral: Negative for depression. Chronic anxiety, stable       Physical Exam  Visit Vitals  /76 (BP 1 Location: Left arm, BP Patient Position: Sitting)   Pulse 90   Temp 99 °F (37.2 °C)   Resp 16   Ht 4' 11\" (1.499 m)   Wt 167 lb 12.8 oz (76.1 kg)   SpO2 98%   BMI 33.89 kg/m²     HEENT: normocephalic, extraocular movements intact, conjunctiva clear  Oropharynx: clear. No erythema, exudate, or drainage  Nose: no drainage  Sinuses: nontender  Ears: tympanic membrane's and canals normal.  No erythema, fluid, drainage  Neck:- supple, no adenopathy  Heart[de-identified] normal rate, regular rhythm, normal S1, S2, I/VI systolic murmur  Chest: clear to auscultation, no wheezes, rales or rhonchi,   Abdominal: soft, nontender, nondistended, no masses or organomegaly. Extremities: no edema  Neuro: no focal weakness or sensory changes  Psych: normal mood and affect  ASSESSMENT and PLAN  There are no medical contraindications to total knee replacement surgery pending review of labs and EKG   H/O DVT: Patient is at risk due to history of DVT, age, orthopedic surgery. VTE prophylaxis per Orthopedics   Patient denies history of abnormal bleeding. She knows to discontinue all NSAIDS 1 week prior to surgery. Diabetes: Good control. Recent HgB A1C is up a bit from previous level, which patient attributes to dietary indiscretions. Discussed increasing her Glucophage to 1000 mg bid, but patient reports that she is unable to tolerate due to GI upset.  She will continue to work on her diet and follow up with Beatrice Shaw MD.   FELIX: uses CPAP  H/O Anxiety: stable

## 2019-03-27 NOTE — PROGRESS NOTES
Left knee total replacement with Dr. Jaylen Cabrera on 4/10/19  EKG done at cardiologists and lab work already done.

## 2019-03-27 NOTE — PATIENT INSTRUCTIONS
Continue your current medications  follow up with Khadar Lazar MD and with Dr. Sary Stratton this summer

## 2019-03-28 NOTE — PROGRESS NOTES
HISTORY OF PRESENT ILLNESS Jamey Arreola is a [de-identified] y.o. female. HPI Jaemy Arreola presents for preop evaluation: Patient has a history of Type II Diabetes Mellitus, Anxiety, Sleep Apnea on CPAP, Osteoarthritis, 
Surgeon :Dr. Mariaa Mueller Type of surgery :Left Total Knee Replacement Surgery site : Left Knee Anesthesia type: *** Date of procedure:  4/10/19 Last HgB A1C 7.3% 2/27/19 Allergies: Allergies Allergen Reactions  Januvia [Sitagliptin] Rash  B12 [Cyanocobalamin-Cobamamide] Hives  
  w/injection  Decongestant [Brompheniramine Maleate] Other (comments)  
  heart  Metrizamide Hives  
  w/injection  Brompheniramine Rash  
  heart Latex allergy: {yes [id number]} Patient Active Problem List  
Diagnosis Code  Diabetes mellitus type 2, controlled ([facility] [address] E11.9  Urge incontinence of urine N39.41  
 Atrial premature depolarization I49.1  Anxiety disorder due to general medical condition F06.4  Post-nasal drip R09.82  Fecal incontinence R15.9  Vitamin D deficiency E55.9  
 IBS (irritable bowel syndrome) K58.9  FELIX on CPAP G47.33, Z99.89  
 Obesity (BMI 30-39. 9) E66.9  Chronic prescription benzodiazepine use Z79.899  Mitral valve prolapse, nonrheumatic I34.1  Osteoarthritis involving multiple joints on both sides of body M15.9 Past Surgical History:  
Procedure Laterality Date  HX HEENT    
 tonsilectomy  HX HYSTERECTOMY partial Hysterectomy  HX ORTHOPAEDIC    
 left knee-arthroscopic  HX ORTHOPAEDIC    
 carpal tunnel  HX OTHER SURGICAL Left   
 wrist, middle finger-trigger Social History Tobacco Use  Smoking status: Never Smoker  Smokeless tobacco: Never Used Substance Use Topics  Alcohol use: Yes Alcohol/week: 0.0 oz  
  Comment: occassionally  Drug use: No  
 
Current Outpatient Medications on File Prior to Visit Medication Sig Dispense Refill  naproxen sodium (ALEVE) 220 mg cap Take  by mouth.  glucose blood VI test strips (ONETOUCH ULTRA BLUE TEST STRIP) strip Check blood sugar twice a day E11.9 150 Strip 3  
 triamcinolone acetonide (KENALOG) 0.1 % topical cream Apply  to affected area two (2) times a day. use thin layer 45 g 0  
 metFORMIN ER (GLUCOPHAGE XR) 750 mg tablet Take 1 Tab by mouth two (2) times daily (with meals). 180 Tab 3  
 glipiZIDE (GLUCOTROL) 5 mg tablet Take 1 and half tabs with breakfast and 1 tab with dinner 225 Tab 3  
 sertraline (ZOLOFT) 50 mg tablet Take 1 Tab by mouth daily. 90 Tab 2  
 amLODIPine (NORVASC) 5 mg tablet TAKE ONE TABLET BY MOUTH ONE TIME DAILY  12  Blood-Glucose Meter monitoring kit Check BG 2 times/day; onetouch meter; E11.9 1 Kit 0  
 glucose blood VI test strips (BLOOD GLUCOSE TEST) strip Check BG 2 times/day 100 Strip 11  
 cyanocobalamin (VITAMIN B-12) 500 mcg tablet Take 500 mcg by mouth two (2) times a day.  biotin 2,500 mcg tab Take 10,000 mcg by mouth.  Cholecalciferol, Vitamin D3, (VITAMIN D3) 1,000 unit cap Take 1,000 Units by mouth.  MULTIVITAMIN PO Take 1 Tab by mouth daily.  aspirin delayed-release 81 mg tablet Take 81 mg by mouth daily. Every other day  clobetasol (TEMOVATE) 0.05 % external solution USE ON SCALP 1-2 TIMES DAILY AS NEEDED  5  
 nitroglycerin (NITROSTAT) 0.4 mg SL tablet 0.4 mg by SubLINGual route every five (5) minutes as needed for Chest Pain. Up to 3 doses.  fexofenadine (ALLEGRA) 180 mg tablet Take 1 Tab by mouth daily. 30 Tab 4  
 diclofenac (VOLTAREN) 1 % gel Apply  to affected area four (4) times daily.  fluticasone (FLONASE) 50 mcg/actuation nasal spray 2 Sprays by Both Nostrils route daily. 1 Bottle 0 No current facility-administered medications on file prior to visit. Review of Systems Constitutional: Negative for chills, fever and weight loss. HENT: Negative for sore throat. Sinus congestion/chronic Respiratory: Negative for cough, shortness of breath and wheezing. Cardiovascular: Negative for chest pain and leg swelling. Chronic lifelong palpitations. H/O PVCs, APCs Evaluated by Dr. Jeff Park, Cardiology Gastrointestinal: Negative for abdominal pain, blood in stool, heartburn, melena, nausea and vomiting. Occasional diarrhea Genitourinary: Negative for dysuria and frequency. Musculoskeletal: Positive for joint pain. Neurological: Negative for sensory change, focal weakness and weakness. Psychiatric/Behavioral: Negative for depression. Chronic anxiety, stable Physical Exam 
 
ASSESSMENT and PLAN 
{ASSESSMENT/PLAN:[id number]}

## 2019-04-05 ENCOUNTER — DOCUMENTATION ONLY (OUTPATIENT)
Dept: INTERNAL MEDICINE CLINIC | Age: 79
End: 2019-04-05

## 2019-04-05 ENCOUNTER — TELEPHONE (OUTPATIENT)
Dept: INTERNAL MEDICINE CLINIC | Age: 79
End: 2019-04-05

## 2019-04-05 NOTE — PROGRESS NOTES
Faxed pt's pre op form to [phone] as requested. Confirmation received. Forms sent to stat scan.

## 2019-04-05 NOTE — TELEPHONE ENCOUNTER
Please re-fax pre-op papers to Dr. Shannon Cadena office. New fax number is [phone]. [facility] says they still have not gotten the paperwork. [facility] said they need them Monday morning.

## 2019-05-13 DIAGNOSIS — F06.4 ANXIETY DISORDER DUE TO GENERAL MEDICAL CONDITION: ICD-10-CM

## 2019-05-14 RX ORDER — SERTRALINE HYDROCHLORIDE 50 MG/1
50 TABLET, FILM COATED ORAL DAILY
Qty: 90 TAB | Refills: 1 | Status: SHIPPED | OUTPATIENT
Start: 2019-05-14

## 2019-10-02 ENCOUNTER — OFFICE VISIT (OUTPATIENT)
Dept: SLEEP MEDICINE | Age: 79
End: 2019-10-02

## 2019-10-02 VITALS
WEIGHT: 162 LBS | HEIGHT: 59 IN | SYSTOLIC BLOOD PRESSURE: 126 MMHG | RESPIRATION RATE: 16 BRPM | BODY MASS INDEX: 32.66 KG/M2 | OXYGEN SATURATION: 97 % | DIASTOLIC BLOOD PRESSURE: 72 MMHG | HEART RATE: 84 BPM

## 2019-10-02 DIAGNOSIS — E11.9 CONTROLLED TYPE 2 DIABETES MELLITUS WITHOUT COMPLICATION, WITHOUT LONG-TERM CURRENT USE OF INSULIN (HCC): ICD-10-CM

## 2019-10-02 DIAGNOSIS — G47.33 OBSTRUCTIVE SLEEP APNEA (ADULT) (PEDIATRIC): Primary | ICD-10-CM

## 2019-10-02 RX ORDER — METFORMIN HYDROCHLORIDE 500 MG/1
TABLET, EXTENDED RELEASE ORAL
COMMUNITY
Start: 2019-09-21 | End: 2020-08-25

## 2019-10-02 NOTE — PROGRESS NOTES
[address] [facility], [address]  Tel.  [phone]  Fax. 100 [facility] 60  [place], [address] [facility]  Tel.  [phone]  Fax. [phone] 9250 [facility] Jessenia Espinoza   Tel.  [phone]  Fax. [phone]     S>Della Sofia is a 78 y.o. female seen for a positive airway pressure follow-up. She reports no problems using the device. The following problems are identified:    Drowsiness At times Problems exhaling no   Snoring no Forget to put on no   Mask Comfortable yes Can't fall asleep no   Dry Mouth no Mask falls off no   Air Leaking no Frequent awakenings no     Download reviewed. She admits that her sleep has improved. Therapy Apnea Index averaged over PAP use: 2 /hr which reflects improved sleep breathing condition. Allergies   Allergen Reactions    Januvia [Sitagliptin] Rash    B12 [Cyanocobalamin-Cobamamide] Hives     w/injection    Decongestant [Brompheniramine Maleate] Other (comments)     heart    Metrizamide Hives     w/injection    Brompheniramine Rash     heart       She has a current medication list which includes the following prescription(s): metformin er, sertraline, naproxen sodium, glucose blood vi test strips, clobetasol, glipizide, nitroglycerin, amlodipine, fexofenadine, blood-glucose meter, glucose blood vi test strips, fluticasone propionate, biotin, cholecalciferol, multivitamin, aspirin delayed-release, triamcinolone acetonide, metformin er, diclofenac, and cyanocobalamin. .      She  has a past medical history of Diabetes ([address] DVT, lower extremity ([address] IBS (irritable bowel syndrome), Irregular heart beat, MVP (mitral valve prolapse), and Thromboembolus ([address]     [place] Sleepiness Score: 13   and Modified F.O.S.Q. Score Total / 2: 16.5      O>    Visit Vitals  /72 (BP 1 Location: Left arm, BP Patient Position: Sitting)   Pulse 84   Resp 16   Ht 4' 11\" (1.499 m)   Wt 162 lb (73.5 kg)   SpO2 97% BMI 32.72 kg/m²           General:   Alert, oriented, not in distress   Neck:   No JVD    Chest/Lungs:  symetrical lung expansion , no accessory muscle use    Extremities:  no obvious rashes , negative edema    Neuro:  No focal deficits ; No obvious tremor    Psych:  Normal affect ,  Normal countenance ;         A>    ICD-10-CM ICD-9-CM    1. Obstructive sleep apnea (adult) (pediatric) G47.33 327.23 AMB SUPPLY ORDER   2. Controlled type 2 diabetes mellitus without complication, without long-term current use of insulin ([facility]) E11.9 250.00      AHI = 16(2015). On CPAP :  5-10 cmH2O. Compliant:      yes    Therapeutic Response:  Positive    P>      *   Follow-up and Dispositions    · Return in about 1 year (around 10/2/2020). she is compliant to therapy and  PAP therapy is effective and remains necessary for treatment of sleep apnea  I have ordered replacement supplies  she will continue on her current pressure settings. * She was asked to contact our office for any problems regarding PAP therapy. * Counseling was provided regarding the importance of regular PAP use and on proper sleep hygiene and safe driving. * Re-enforced proper and regular cleaning for the device. 2. Type II diabetes - she continues on her current regimen. I have reviewed the relationship between sleep disordered breathing as it relates to diabetes.     Electronically signed by    Erin Perez MD  Diplomate in Sleep Medicine  [facility]

## 2019-10-02 NOTE — PATIENT INSTRUCTIONS
[address] [facility] [facility] [address] [place]. [place], [address]  Tel.  [phone]  Fax. 100 [facility] 60  [place], 200 [address] [facility]  Tel.  [phone]  Fax. [phone] 9250 [facility] Jessenia Espinoza  Tel.  [phone]  Fax. [phone]     PROPER SLEEP HYGIENE    What to avoid  · Do not have drinks with caffeine, such as coffee or black tea, for 8 hours before bed. · Do not smoke or use other types of tobacco near bedtime. Nicotine is a stimulant and can keep you awake. · Avoid drinking alcohol late in the evening, because it can cause you to wake in the middle of the night. · Do not eat a big meal close to bedtime. If you are hungry, eat a light snack. · Do not drink a lot of water close to bedtime, because the need to urinate may wake you up during the night. · Do not read or watch TV in bed. Use the bed only for sleeping and sexual activity. What to try  · Go to bed at the same time every night, and wake up at the same time every morning. Do not take naps during the day. · Keep your bedroom quiet, dark, and cool. · Get regular exercise, but not within 3 to 4 hours of your bedtime. .  · Sleep on a comfortable pillow and mattress. · If watching the clock makes you anxious, turn it facing away from you so you cannot see the time. · If you worry when you lie down, start a worry book. Well before bedtime, write down your worries, and then set the book and your concerns aside. · Try meditation or other relaxation techniques before you go to bed. · If you cannot fall asleep, get up and go to another room until you feel sleepy. Do something relaxing. Repeat your bedtime routine before you go to bed again. · Make your house quiet and calm about an hour before bedtime. Turn down the lights, turn off the TV, log off the computer, and turn down the volume on music. This can help you relax after a busy day.     Drowsy Driving  The [address] [place] [address] Road Traffic Safety Administration cites drowsiness as a causing factor in more than 874,420 police reported crashes annually, resulting in 76,000 injuries and 1,500 deaths. Other surveys suggest 55% of people polled have driven while drowsy in the past year, 23% had fallen asleep but not crashed, 3% crashed, and 2% had and accident due to drowsy driving. Who is at risk? Young Drivers: One study of drowsy driving accidents states that 55% of the drivers were under 25 years. Of those, 75% were male. Shift Workers and Travelers: People who work overnight or travel across time zones frequently are at higher risk of experiencing Circadian Rhythm Disorders. They are trying to work and function when their body is programed to sleep. Sleep Deprived: Lack of sleep has a serious impact on your ability to pay attention or focus on a task. Consistently getting less than the average of 8 hours your body needs creates partial or cumulative sleep deprivation. Untreated Sleep Disorders: Sleep Apnea, Narcolepsy, R.L.S., and other sleep disorders (untreated) prevent a person from getting enough restful sleep. This leads to excessive daytime sleepiness and increases the risk for drowsy driving accidents by up to 7 times. Medications / Alcohol: Even over the counter medications can cause drowsiness. Medications that impair a drivers attention should have a warning label. Alcohol naturally makes you sleepy and on its own can cause accidents. Combined with excessive drowsiness its effects are amplified. Signs of Drowsy Driving:   * You don't remember driving the last few miles   * You may drift out of your lucia   * You are unable to focus and your thoughts wander   * You may yawn more often than normal   * You have difficulty keeping your eyes open / nodding off   * Missing traffic signs, speeding, or tailgating  Prevention-   Good sleep hygiene, lifestyle and behavioral choices have the most impact on drowsy driving.  There is no substitute for sleep and the average person requires 8 hours nightly. If you find yourself driving drowsy, stop and sleep. Consider the sleep hygiene tips provided during your visit as well. Medication Refill Policy: Refills for all medications require 1 week advance notice. Please have your pharmacy fax a refill request. We are unable to fax, or call in \"controled substance\" medications and you will need to pick these prescriptions up from our office. [facility] [employer] Activation    Thank you for requesting access to [employer]. Please follow the instructions below to securely access and download your online medical record. [employer] allows you to send messages to your doctor, view your test results, renew your prescriptions, schedule appointments, and more. How Do I Sign Up? 1. In your internet browser, go to [email or web address]. 2. Click on the First Time User? Click Here link in the Sign In box. You will see the New Member Sign Up page. 3. Enter your [employer] Access Code exactly as it appears below. You will not need to use this code after youve completed the sign-up process. If you do not sign up before the expiration date, you must request a new code. [employer] Access Code: Activation code not generated  Current [employer] Status: Active (This is the date your [employer] access code will )    4. Enter the last four digits of your Social Security Number (xxxx) and Date of Birth (mm/dd/yyyy) as indicated and click Submit. You will be taken to the next sign-up page. 5. Create a [employer] ID. This will be your [employer] login ID and cannot be changed, so think of one that is secure and easy to remember. 6. Create a [employer] password. You can change your password at any time. 7. Enter your Password Reset Question and Answer. This can be used at a later time if you forget your password. 8. Enter your e-mail address. You will receive e-mail notification when new information is available in [address]   9. Click Sign Up. You can now view and download portions of your medical record. 10. Click the Download Summary menu link to download a portable copy of your medical information. Additional Information    If you have questions, please call [phone]. Remember, [employer] is NOT to be used for urgent needs. For medical emergencies, dial 911.

## 2019-10-03 ENCOUNTER — DOCUMENTATION ONLY (OUTPATIENT)
Dept: SLEEP MEDICINE | Age: 79
End: 2019-10-03

## 2020-07-20 ENCOUNTER — OFFICE VISIT (OUTPATIENT)
Dept: OBGYN CLINIC | Age: 80
End: 2020-07-20

## 2020-07-20 VITALS
SYSTOLIC BLOOD PRESSURE: 142 MMHG | DIASTOLIC BLOOD PRESSURE: 76 MMHG | BODY MASS INDEX: 33.87 KG/M2 | HEIGHT: 59 IN | WEIGHT: 168 LBS

## 2020-07-20 DIAGNOSIS — N64.52 NIPPLE DISCHARGE IN FEMALE: Primary | ICD-10-CM

## 2020-07-20 NOTE — PROGRESS NOTES
Problem Visit    Heather Akhtar is a 78 y.o.  presenting for problem visit. Her main concerns today are breast discharge and lumps. She is a previous patient of Dr. Marion Stanford, last seen around 5 years ago. She reports that around 1-2 months ago she noticed some clear discharge from her L nipple, which occurs with pressure or manual expression. This led her to check her R breast as well. She found that expression of R breast produced bloody discharge from the nipple. She also noted several lumps in her R breast.    It has been at least 4 years since her last mammogram. She states she has a history of calcium deposits noted in her L breast. She has history of 2 biopsies in her left breast. No other history of breast problems.       Ob/Gyn Hx:  G P  -  LMP- menopause and s/p hysterectomy  Menses- n/a        Past Medical History:   Diagnosis Date    Diabetes ([facility]     adult-onset    DVT, lower extremity (HCC)     IBS (irritable bowel syndrome)     Irregular heart beat     (PVCs)    MVP (mitral valve prolapse)     h/o (neg ECHO-3/06)    Thromboembolus (HCC)        Past Surgical History:   Procedure Laterality Date    HX HEENT      tonsilectomy    HX HYSTERECTOMY      partial Hysterectomy    HX ORTHOPAEDIC      left knee-arthroscopic    HX ORTHOPAEDIC      carpal tunnel    HX OTHER SURGICAL Left     wrist, middle finger-trigger        Family History   Problem Relation Age of Onset    Heart Failure Father         CHF    Emphysema Father         (smoker)    COPD Father     Arthritis-osteo Mother     Arrhythmia Mother     Heart Attack Mother     Heart Attack Maternal Grandmother        Social History     Socioeconomic History    Marital status:      Spouse name: Not on file    Number of children: Not on file    Years of education: Not on file    Highest education level: Not on file   Occupational History    Not on file   Social Needs    Financial resource strain: Not on file   Bridge-[place]athy insecurity     Worry: Not on file     Inability: Not on file    Transportation needs     Medical: Not on file     Non-medical: Not on file   Tobacco Use    Smoking status: Never Smoker    Smokeless tobacco: Never Used   Substance and Sexual Activity    Alcohol use: Yes     Alcohol/week: 0.0 standard drinks     Comment: occassionally    Drug use: No    Sexual activity: Never   Lifestyle    Physical activity     Days per week: Not on file     Minutes per session: Not on file    Stress: Not on file   Relationships    Social connections     Talks on phone: Not on file     Gets together: Not on file     Attends Orthodoxy service: Not on file     Active member of club or organization: Not on file     Attends meetings of clubs or organizations: Not on file     Relationship status: Not on file    Intimate partner violence     Fear of current or ex partner: Not on file     Emotionally abused: Not on file     Physically abused: Not on file     Forced sexual activity: Not on file   Other Topics Concern    Not on file   Social History Narrative    Not on file       Current Outpatient Medications   Medication Sig Dispense Refill    metFORMIN ER (GLUCOPHAGE XR) 500 mg tablet       sertraline (ZOLOFT) 50 mg tablet Take 1 Tab by mouth daily. 90 Tab 1    naproxen sodium (ALEVE) 220 mg cap Take  by mouth.  glucose blood VI test strips (ONETOUCH ULTRA BLUE TEST STRIP) strip Check blood sugar twice a day E11.9 150 Strip 3    glipiZIDE (GLUCOTROL) 5 mg tablet Take 1 and half tabs with breakfast and 1 tab with dinner 225 Tab 3    amLODIPine (NORVASC) 5 mg tablet TAKE ONE TABLET BY MOUTH ONE TIME DAILY  12    fexofenadine (ALLEGRA) 180 mg tablet Take 1 Tab by mouth daily.  30 Tab 4    Blood-Glucose Meter monitoring kit Check BG 2 times/day; onetouch meter; E11.9 1 Kit 0    glucose blood VI test strips (BLOOD GLUCOSE TEST) strip Check BG 2 times/day 100 Strip 11    diclofenac (VOLTAREN) 1 % gel Apply  to affected area four (4) times daily.  fluticasone (FLONASE) 50 mcg/actuation nasal spray 2 Sprays by Both Nostrils route daily. 1 Bottle 0    biotin 2,500 mcg tab Take 10,000 mcg by mouth.  Cholecalciferol, Vitamin D3, (VITAMIN D3) 1,000 unit cap Take 1,000 Units by mouth.  MULTIVITAMIN PO Take 1 Tab by mouth daily.  aspirin delayed-release 81 mg tablet Take 81 mg by mouth daily. Every other day      triamcinolone acetonide (KENALOG) 0.1 % topical cream Apply  to affected area two (2) times a day. use thin layer 45 g 0    clobetasol (TEMOVATE) 0.05 % external solution USE ON SCALP 1-2 TIMES DAILY AS NEEDED  5    metFORMIN ER (GLUCOPHAGE XR) 750 mg tablet Take 1 Tab by mouth two (2) times daily (with meals). 180 Tab 3    nitroglycerin (NITROSTAT) 0.4 mg SL tablet 0.4 mg by SubLINGual route every five (5) minutes as needed for Chest Pain. Up to 3 doses.  cyanocobalamin (VITAMIN B-12) 500 mcg tablet Take 500 mcg by mouth two (2) times a day.          Allergies   Allergen Reactions    Januvia [Sitagliptin] Rash    B12 [Cyanocobalamin-Cobamamide] Hives     w/injection    Decongestant [Brompheniramine Maleate] Other (comments)     heart    Metrizamide Hives     w/injection    Brompheniramine Rash     heart       Review of Systems - History obtained from the patient  Constitutional: negative for weight loss, fever, night sweats  HEENT: negative for hearing loss, earache, congestion, snoring, sorethroat  CV: negative for chest pain, palpitations, edema  Resp: negative for cough, shortness of breath, wheezing  GI: negative for change in bowel habits, abdominal pain, black or bloody stools  : negative for frequency, dysuria, hematuria, vaginal discharge  MSK: negative for back pain, joint pain, muscle pain  Breast: negative for breast lumps, nipple discharge, galactorrhea  Skin :negative for itching, rash, hives  Neuro: negative for dizziness, headache, confusion, weakness  Psych: negative for anxiety, depression, change in mood  Heme/lymph: negative for bleeding, bruising, pallor    Physical Exam    Visit Vitals  /76 (BP 1 Location: Left arm, BP Patient Position: Sitting)   Ht 4' 11\" (1.499 m)   Wt 168 lb (76.2 kg)   BMI 33.93 kg/m²         Physical Exam  Chest:      Breasts: Breasts are symmetrical.         Right: Mass (dense firm tissue palpated around right areola most prominantly on lateral portion) and nipple discharge (bloody nipple discharge, not seen on today's exam) present. No swelling, bleeding or inverted nipple. Left: Nipple discharge (clear nipple discharge - able to be expressed on today's exam) present. No swelling, bleeding, inverted nipple or mass. Lymphadenopathy:      Upper Body:      Right upper body: No supraclavicular or axillary adenopathy. Left upper body: No supraclavicular or axillary adenopathy. Constitutional  · Appearance: well-nourished, well developed, alert, in no acute distress    HENT  · Head and Face: appears normal    Neck  · Inspection/Palpation: normal appearance, no masses or tenderness  · Thyroid: gland size normal, nontender    Chest  · Respiratory Effort: non-labored breathing    Cardiovascular  · Extremities: no peripheral edema    Gastrointestinal  · Abdominal Examination: abdomen non-distended, obese  · Liver and spleen: no hepatomegaly present, spleen not palpable  · Hernias: no hernias identified      Skin  · General Inspection: no rash, no lesions identified    Neurologic/Psychiatric  · Mental Status:  · Orientation: grossly oriented to person, place and time  · Mood and Affect: mood normal, affect appropriate      Assessment/Plan:    1. Nipple discharge in female  Discussed her symptoms and my exam findings. She has bilateral nipple discharge, only with expression. The right nipple discharge is bloody and firmness is palpated (but no discrete lesion) palpated inferior to the right nipple.     Recommended diagnostic mammogram to r/o papilloma or malignancy given the abnormal nipple discharge. - LISA 3D FRANCIS W MAMMO BI DX INCL CAD;  Future      Walter Prior, MD

## 2020-07-20 NOTE — PATIENT INSTRUCTIONS
Nipple Discharge: Care Instructions  Your Care Instructions  Fluid leaking from one or both nipples when you are not breastfeeding is called nipple discharge. Clear, cloudy, or white discharge that appears only when you press on your nipple is usually normal. The more the nipple is pressed or stimulated, the more fluid appears. Yellow, green, or brown discharge is not normal and may be a symptom of an infection or other problem. Spontaneous discharge appears without pressing or stimulating the nipple. This is not normal unless you are pregnant or breastfeeding. It may be a side effect of a medicine, or it may be caused by other health problems. The treatment of spontaneous nipple discharge depends on what is causing it. You may need additional tests to find out what is causing the nipple discharge. Follow-up care is a key part of your treatment and safety. Be sure to make and go to all appointments, and call your doctor if you are having problems. It's also a good idea to know your test results and keep a list of the medicines you take. How can you care for yourself at home? · If your doctor gave you medicine, take it exactly as prescribed. Call your doctor if you think you are having a problem with your medicine. · Wear a supportive bra, such as a sports bra or jog bra. · Avoid stimulating your breast until you have your follow-up appointment. When should you call for help? Call your doctor now or seek immediate medical care if:  · You have symptoms of a breast infection, such as:  ? Increased pain, swelling, redness, or warmth around a breast.  ? Red streaks extending from the breast.  ? Pus draining from a breast.  ? A fever. Watch closely for changes in your health, and be sure to contact your doctor if:  · You notice any changes in your breast or discharge. · You do not get better as expected. Where can you learn more?   Go to http://www.gray.com/  Enter L878 in the search box to learn more about \"Nipple Discharge: Care Instructions. \"  Current as of: June 26, 2019               Content Version: 12.5  © 9607-1524 [employer]wise[employer]. Care instructions adapted under license by [employer] (which disclaims liability or warranty for this information). If you have questions about a medical condition or this instruction, always ask your healthcare professional. [address] any warranty or liability for your use of this information.

## 2020-07-29 ENCOUNTER — HOSPITAL ENCOUNTER (OUTPATIENT)
Dept: MAMMOGRAPHY | Age: 80
Discharge: HOME | End: 2020-07-29
Attending: OBSTETRICS & GYNECOLOGY
Payer: MEDICARE

## 2020-07-29 DIAGNOSIS — N64.52 NIPPLE DISCHARGE IN FEMALE: ICD-10-CM

## 2020-07-29 PROCEDURE — 77062 BREAST TOMOSYNTHESIS BI: CPT

## 2020-07-29 PROCEDURE — 76642 ULTRASOUND BREAST LIMITED: CPT

## 2020-07-29 NOTE — PROGRESS NOTES
I spoke with [facility], they refer to the [address] [place] [address]. I will call the pt and inform her to call and make an appt herself as a new pt for her nipple discharge.   adelia

## 2020-08-25 ENCOUNTER — OFFICE VISIT (OUTPATIENT)
Dept: INTERNAL MEDICINE CLINIC | Age: 80
End: 2020-08-25
Payer: MEDICARE

## 2020-08-25 VITALS
SYSTOLIC BLOOD PRESSURE: 130 MMHG | BODY MASS INDEX: 32.59 KG/M2 | TEMPERATURE: 98.4 F | OXYGEN SATURATION: 98 % | DIASTOLIC BLOOD PRESSURE: 60 MMHG | RESPIRATION RATE: 17 BRPM | HEART RATE: 98 BPM | HEIGHT: 60 IN | WEIGHT: 166 LBS

## 2020-08-25 DIAGNOSIS — Z86.718 HISTORY OF DVT (DEEP VEIN THROMBOSIS): ICD-10-CM

## 2020-08-25 DIAGNOSIS — F41.9 ANXIETY: ICD-10-CM

## 2020-08-25 DIAGNOSIS — I49.1 PAC (PREMATURE ATRIAL CONTRACTION): ICD-10-CM

## 2020-08-25 DIAGNOSIS — E11.9 TYPE 2 DIABETES MELLITUS WITHOUT COMPLICATION, WITHOUT LONG-TERM CURRENT USE OF INSULIN (HCC): ICD-10-CM

## 2020-08-25 DIAGNOSIS — G47.30 SLEEP APNEA, UNSPECIFIED TYPE: ICD-10-CM

## 2020-08-25 DIAGNOSIS — Z00.00 MEDICARE ANNUAL WELLNESS VISIT, SUBSEQUENT: Primary | ICD-10-CM

## 2020-08-25 DIAGNOSIS — Z13.39 SCREENING FOR ALCOHOLISM: ICD-10-CM

## 2020-08-25 DIAGNOSIS — E78.00 HYPERCHOLESTEROLEMIA: ICD-10-CM

## 2020-08-25 PROCEDURE — 1101F PT FALLS ASSESS-DOCD LE1/YR: CPT | Performed by: INTERNAL MEDICINE

## 2020-08-25 PROCEDURE — G8536 NO DOC ELDER MAL SCRN: HCPCS | Performed by: INTERNAL MEDICINE

## 2020-08-25 PROCEDURE — G0463 HOSPITAL OUTPT CLINIC VISIT: HCPCS | Performed by: INTERNAL MEDICINE

## 2020-08-25 PROCEDURE — G8510 SCR DEP NEG, NO PLAN REQD: HCPCS | Performed by: INTERNAL MEDICINE

## 2020-08-25 PROCEDURE — G8419 CALC BMI OUT NRM PARAM NOF/U: HCPCS | Performed by: INTERNAL MEDICINE

## 2020-08-25 PROCEDURE — G8427 DOCREV CUR MEDS BY ELIG CLIN: HCPCS | Performed by: INTERNAL MEDICINE

## 2020-08-25 PROCEDURE — G0444 DEPRESSION SCREEN ANNUAL: HCPCS | Performed by: INTERNAL MEDICINE

## 2020-08-25 PROCEDURE — 99214 OFFICE O/P EST MOD 30 MIN: CPT | Performed by: INTERNAL MEDICINE

## 2020-08-25 PROCEDURE — G0439 PPPS, SUBSEQ VISIT: HCPCS | Performed by: INTERNAL MEDICINE

## 2020-08-25 PROCEDURE — G8399 PT W/DXA RESULTS DOCUMENT: HCPCS | Performed by: INTERNAL MEDICINE

## 2020-08-25 PROCEDURE — 1090F PRES/ABSN URINE INCON ASSESS: CPT | Performed by: INTERNAL MEDICINE

## 2020-08-25 RX ORDER — ATORVASTATIN CALCIUM 10 MG/1
TABLET, FILM COATED ORAL DAILY
COMMUNITY

## 2020-08-25 NOTE — PROGRESS NOTES
This is the Subsequent Medicare Annual Wellness Exam, performed 12 months or more after the Initial AWV or the last Subsequent AWV    I have reviewed the patient's medical history in detail and updated the computerized patient record. History     Patient Active Problem List   Diagnosis Code    Diabetes mellitus type 2, controlled ([facility] [address] E11.9    Urge incontinence of urine N39.41    Atrial premature depolarization I49.1    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 9) E66.9    Chronic prescription benzodiazepine use Z79.899    Mitral valve prolapse, nonrheumatic I34.1    Osteoarthritis involving multiple joints on both sides of body M15.9     Past Medical History:   Diagnosis Date    Diabetes ([facility] [address]     adult-onset    DVT, lower extremity (HCC)     IBS (irritable bowel syndrome)     Irregular heart beat     (PVCs)    MVP (mitral valve prolapse)     h/o (neg ECHO-3/06)    Thromboembolus (HCC)       Past Surgical History:   Procedure Laterality Date    HX HEENT      tonsilectomy    HX HYSTERECTOMY      partial Hysterectomy    HX ORTHOPAEDIC      left knee-arthroscopic    HX ORTHOPAEDIC      carpal tunnel    HX OTHER SURGICAL Left     wrist, middle finger-trigger     LISA STEREO  BX BREAST LT 1ST LESION W/CLIP AND SPECIMEN Left     mult benign needle bx's over the years     Current Outpatient Medications   Medication Sig Dispense Refill    sertraline (ZOLOFT) 50 mg tablet Take 1 Tab by mouth daily. 90 Tab 1    naproxen sodium (ALEVE) 220 mg cap Take 1 Tab by mouth daily as needed.  glucose blood VI test strips (ONETOUCH ULTRA BLUE TEST STRIP) strip Check blood sugar twice a day E11.9 150 Strip 3    triamcinolone acetonide (KENALOG) 0.1 % topical cream Apply  to affected area two (2) times a day.  use thin layer 45 g 0    clobetasol (TEMOVATE) 0.05 % external solution USE ON SCALP 1-2 TIMES DAILY AS NEEDED  5    metFORMIN ER (GLUCOPHAGE XR) 750 mg tablet Take 1 Tab by mouth two (2) times daily (with meals). 180 Tab 3    glipiZIDE (GLUCOTROL) 5 mg tablet Take 1 and half tabs with breakfast and 1 tab with dinner (Patient taking differently: Take 5 mg by mouth. Take 1 tablet with breakfast and 1 tab with dinner) 225 Tab 3    nitroglycerin (NITROSTAT) 0.4 mg SL tablet 0.4 mg by SubLINGual route every five (5) minutes as needed for Chest Pain. Up to 3 doses.  amLODIPine (NORVASC) 5 mg tablet TAKE ONE TABLET BY MOUTH ONE TIME DAILY  12    fexofenadine (ALLEGRA) 180 mg tablet Take 1 Tab by mouth daily. 30 Tab 4    Blood-Glucose Meter monitoring kit Check BG 2 times/day; onetouch meter; E11.9 1 Kit 0    glucose blood VI test strips (BLOOD GLUCOSE TEST) strip Check BG 2 times/day 100 Strip 11    diclofenac (VOLTAREN) 1 % gel Apply  to affected area four (4) times daily.  fluticasone (FLONASE) 50 mcg/actuation nasal spray 2 Sprays by Both Nostrils route daily. 1 Bottle 0    cyanocobalamin (VITAMIN B-12) 500 mcg tablet Take 500 mcg by mouth two (2) times a day.  biotin 2,500 mcg tab Take 10,000 mcg by mouth.  Cholecalciferol, Vitamin D3, (VITAMIN D3) 1,000 unit cap Take 1,000 Units by mouth.  MULTIVITAMIN PO Take 1 Tab by mouth daily.  aspirin delayed-release 81 mg tablet Take 81 mg by mouth daily.  Every other day      metFORMIN ER (GLUCOPHAGE XR) 500 mg tablet        Allergies   Allergen Reactions    Januvia [Sitagliptin] Rash    B12 [Cyanocobalamin-Cobamamide] Hives     w/injection    Decongestant [Brompheniramine Maleate] Other (comments)     heart    Metrizamide Hives     w/injection    Brompheniramine Rash     heart       Family History   Problem Relation Age of Onset    Heart Failure Father         CHF    Emphysema Father         (smoker)    COPD Father     Arthritis-osteo Mother     Arrhythmia Mother     Heart Attack Mother     Breast Cancer Mother         66's    Heart Attack Maternal Grandmother      Social History     Tobacco Use    Smoking status: Never Smoker    Smokeless tobacco: Never Used   Substance Use Topics    Alcohol use: Yes     Alcohol/week: 0.0 standard drinks     Comment: occassionally       Depression Risk Factor Screening:     3 most recent PHQ Screens 8/25/2020   PHQ Not Done -   Little interest or pleasure in doing things Not at all   Feeling down, depressed, irritable, or hopeless Not at all   Total Score PHQ 2 0       Alcohol Risk Factor Screening:   Do you average 1 drink per night or more than 7 drinks a week:  No    On any one occasion in the past three months have you have had more than 3 drinks containing alcohol:  No      Functional Ability and Level of Safety:   Hearing: not good and has hearing aids but does not feel they help he. Activities of Daily Living: The home contains: no safety equipment. Patient does total self care     Ambulation: with no difficulty     Fall Risk:  Fall Risk Assessment, last 12 mths 8/25/2020   Able to walk? Yes   Fall in past 12 months?  No   Fall with injury? -   Number of falls in past 12 months -   Fall Risk Score -     Abuse Screen:  Patient is not abused       Cognitive Screening   Has your family/caregiver stated any concerns about your memory: no     Cognitive Screening: normal    Patient Care Team   Patient Care Team:  Camille Farias MD as PCP - General (Internal Medicine)  Ronal Morales MD as PCP - [facility] Provider  Eliot Ching MD (Ophthalmology)  Yoana Matta MD (Orthopedic Surgery)  Miguelangel Smyth MD (Cardiology)  Sravanthi Pagan MD as Consulting Provider (Endocrinology)    Assessment/Plan   Education and counseling provided:  Are appropriate based on today's review and evaluation        Health Maintenance Due   Topic Date Due    A1C test (Diabetic or Prediabetic) 02/20/2019    MICROALBUMIN Q1  08/20/2019    Lipid Screen  08/20/2019    GLAUCOMA SCREENING Q2Y  09/11/2020     HPI:  Charli DE LEON is also presents today as a new pt to establish care of chronic medical conditions. :   1. DM type 2: BS ranging 130-200 in am fasting. Taking medication and states has some diarrhea on and off on metfromin. She is followed by Dr Wendy Solano. Records requested  2. Anxiety: zoloft daily and tolerating well. Anxiety is controlled. No depression. No SI.   3. Hx PAC and stress test neg. Followed by Dr Larry Ruelas cardiology. Has stationary bike but not using. No exercise. 4.  Sleep apnea and using CPAP nightly with no problems. Feeling well rested. Current Outpatient Medications   Medication Sig Dispense Refill    sertraline (ZOLOFT) 50 mg tablet Take 1 Tab by mouth daily. 90 Tab 1    naproxen sodium (ALEVE) 220 mg cap Take 1 Tab by mouth daily as needed.  glucose blood VI test strips (ONETOUCH ULTRA BLUE TEST STRIP) strip Check blood sugar twice a day E11.9 150 Strip 3    triamcinolone acetonide (KENALOG) 0.1 % topical cream Apply  to affected area two (2) times a day. use thin layer 45 g 0    clobetasol (TEMOVATE) 0.05 % external solution USE ON SCALP 1-2 TIMES DAILY AS NEEDED  5    metFORMIN ER (GLUCOPHAGE XR) 750 mg tablet Take 1 Tab by mouth two (2) times daily (with meals). 180 Tab 3    glipiZIDE (GLUCOTROL) 5 mg tablet Take 1 and half tabs with breakfast and 1 tab with dinner (Patient taking differently: Take 5 mg by mouth. Take 1 tablet with breakfast and 1 tab with dinner) 225 Tab 3    nitroglycerin (NITROSTAT) 0.4 mg SL tablet 0.4 mg by SubLINGual route every five (5) minutes as needed for Chest Pain. Up to 3 doses.  amLODIPine (NORVASC) 5 mg tablet TAKE ONE TABLET BY MOUTH ONE TIME DAILY  12    fexofenadine (ALLEGRA) 180 mg tablet Take 1 Tab by mouth daily.  30 Tab 4    Blood-Glucose Meter monitoring kit Check BG 2 times/day; onetouch meter; E11.9 1 Kit 0    glucose blood VI test strips (BLOOD GLUCOSE TEST) strip Check BG 2 times/day 100 Strip 11    diclofenac (VOLTAREN) 1 % gel Apply  to affected area four (4) times daily.  fluticasone (FLONASE) 50 mcg/actuation nasal spray 2 Sprays by Both Nostrils route daily. 1 Bottle 0    cyanocobalamin (VITAMIN B-12) 500 mcg tablet Take 500 mcg by mouth two (2) times a day.  biotin 2,500 mcg tab Take 10,000 mcg by mouth.  Cholecalciferol, Vitamin D3, (VITAMIN D3) 1,000 unit cap Take 1,000 Units by mouth.  MULTIVITAMIN PO Take 1 Tab by mouth daily.  aspirin delayed-release 81 mg tablet Take 81 mg by mouth daily. Every other day       Allergies   Allergen Reactions    Januvia [Sitagliptin] Rash    B12 [Cyanocobalamin-Cobamamide] Hives     w/injection    Decongestant [Brompheniramine Maleate] Other (comments)     heart    Metrizamide Hives     w/injection    Brompheniramine Rash     heart     Social History     Tobacco Use    Smoking status: Never Smoker    Smokeless tobacco: Never Used   Substance Use Topics    Alcohol use: Yes     Alcohol/week: 0.0 standard drinks     Comment: occassionally         Review of Systems   Constitutional: Negative for chills, fever and malaise/fatigue. HENT: Negative for congestion and sore throat. Eyes: Negative for blurred vision. Respiratory: Negative for cough, shortness of breath and wheezing. Cardiovascular: Positive for palpitations and leg swelling (Ankles at the end of the day). Negative for chest pain. Gastrointestinal: Negative for abdominal pain, blood in stool, constipation, heartburn, nausea and vomiting. Genitourinary: Negative for dysuria, frequency and urgency. Musculoskeletal: Positive for back pain and joint pain (Knees and shoulder). Negative for myalgias. Skin: Negative for rash. Neurological: Negative for dizziness, sensory change, focal weakness and headaches. Psychiatric/Behavioral: Negative for depression.  The patient is not nervous/anxious and does not have insomnia. Physical Exam  Vitals signs and nursing note reviewed. Constitutional:       General: She is not in acute distress. Appearance: She is well-developed. HENT:      Head: Normocephalic and atraumatic. Right Ear: Tympanic membrane and ear canal normal.      Left Ear: Tympanic membrane and ear canal normal.      Nose: Nose normal.   Eyes:      Conjunctiva/sclera: Conjunctivae normal.      Pupils: Pupils are equal, round, and reactive to light. Neck:      Musculoskeletal: Normal range of motion. Thyroid: No thyromegaly. Cardiovascular:      Rate and Rhythm: Normal rate and regular rhythm. Heart sounds: Murmur present. Systolic murmur present with a grade of 2/6. Pulmonary:      Effort: Pulmonary effort is normal.      Breath sounds: Normal breath sounds. Abdominal:      General: Bowel sounds are normal.      Palpations: Abdomen is soft. There is no mass. Tenderness: There is no abdominal tenderness. Musculoskeletal:      Right knee: She exhibits decreased range of motion. She exhibits no swelling. Left knee: She exhibits decreased range of motion (Status post left knee replacement). She exhibits no swelling. Tenderness found. Right lower leg: Edema (Trace ankle) present. Left lower leg: Edema (Trace ankle) present. Lymphadenopathy:      Cervical: No cervical adenopathy. Skin:     Findings: No rash. Neurological:      Cranial Nerves: No cranial nerve deficit. Sensory: No sensory deficit. Monofilament intact bilaterally. Pulses 1+ bilaterally. No skin lesions or open wounds. Visit Vitals  /60 (BP 1 Location: Left arm, BP Patient Position: Sitting)   Pulse 98   Temp 98.4 °F (36.9 °C) (Oral)   Resp 17   Ht 5' 0.16\" (1.528 m)   Wt 166 lb (75.3 kg)   SpO2 98%   BMI 32.25 kg/m²       Assessment & Plan:    ICD-10-CM ICD-9-CM    1.  Medicare annual wellness visit, subsequent   Health maintenance reviewed and updated with patient today at visit. Z00.00 V70.0    2. Screening for alcoholism  Z13.39 V79.1    3. Anxiety   At goal continue current medication F41.9 300.00    4. Type 2 diabetes mellitus without complication, without long-term current use of insulin ([facility] [address]   We will check lab work. Will continue with management as per endocrine. Continue to work on diabetic diet weight loss and exercise E11.9 250.00 MICROALBUMIN, UR, RAND W/ MICROALB/CREAT RATIO      HEMOGLOBIN A1C WITH EAG   5. History of DVT (deep vein thrombosis)   No recurrence Z86.718 V12.51    6. PAC (premature atrial contraction)   Stable and followed by Dr. Rowena Mcfarland and cardiology I49.1 427.61    7. Hypercholesterolemia   Tolerated medication well. Check lab work. E78.00 272.0 LIPID PANEL      METABOLIC PANEL, COMPREHENSIVE      CBC WITH AUTOMATED DIFF      TSH REFLEX TO T4   8. Sleep apnea, unspecified type   Well-controlled on CPAP G47.30 780.57       Orders Placed This Encounter    LIPID PANEL    METABOLIC PANEL, COMPREHENSIVE    CBC WITH AUTOMATED DIFF    MICROALBUMIN, UR, RAND W/ MICROALB/CREAT RATIO    HEMOGLOBIN A1C WITH EAG    TSH REFLEX TO T4    atorvastatin (LIPITOR) 10 mg tablet   Follow-up 6 months     Advised her to call back or return to office if symptoms worsen/change/persist.  Discussed expected course/resolution/complications of diagnosis in detail with patient. Medication risks/benefits/costs/interactions/alternatives discussed with patient. She was given an after visit summary which includes diagnoses, current medications, & vitals. She expressed understanding with the diagnosis and plan.

## 2020-08-25 NOTE — PROGRESS NOTES
Chief Complaint   Patient presents with   [facility] Establish Care     1. Have you been to the ER, urgent care clinic since your last visit? Hospitalized since your last visit? No    2. Have you seen or consulted any other health care providers outside of the [address] [place] [address] since your last visit? Include any pap smears or colon screening.  Yes Where: Dr Brennan De La Torre for visit: routine follow up/ Dr Kemal Wong (knee replacement)

## 2020-08-25 NOTE — PATIENT INSTRUCTIONS
Medicare Wellness Visit, Female The best way to live healthy is to have a lifestyle where you eat a well-balanced diet, exercise regularly, limit alcohol use, and quit all forms of tobacco/nicotine, if applicable. Regular preventive services are another way to keep healthy. Preventive services (vaccines, screening tests, monitoring & exams) can help personalize your care plan, which helps you manage your own care. Screening tests can find health problems at the earliest stages, when they are easiest to treat. [facility] follows the current, evidence-based guidelines published by the [facility] Frankie Samano ([facility]) when recommending preventive services for our patients. Because we follow these guidelines, sometimes recommendations change over time as research supports it. (For example, mammograms used to be recommended annually. Even though Medicare will still pay for an annual mammogram, the newer guidelines recommend a mammogram every two years for women of average risk). Of course, you and your doctor may decide to screen more often for some diseases, based on your risk and your co-morbidities (chronic disease you are already diagnosed with). Preventive services for you include: - Medicare offers their members a free annual wellness visit, which is time for you and your primary care provider to discuss and plan for your preventive service needs. Take advantage of this benefit every year! 
-All adults over the age of 72 should receive the recommended pneumonia vaccines. Current USPSTF guidelines recommend a series of two vaccines for the best pneumonia protection.  
-All adults should have a flu vaccine yearly and a tetanus vaccine every 10 years.  
-All adults age 48 and older should receive the shingles vaccines (series of two vaccines). -All adults age 38-68 who are overweight should have a diabetes screening test once every three years. -All adults born between [birth year] and 1967 should be screened once for Hepatitis C. 
-Other screening tests and preventive services for persons with diabetes include: an eye exam to screen for diabetic retinopathy, a kidney function test, a foot exam, and stricter control over your cholesterol.  
-Cardiovascular screening for adults with routine risk involves an electrocardiogram (ECG) at intervals determined by your doctor.  
-Colorectal cancer screenings should be done for adults age 54-65 with no increased risk factors for colorectal cancer. There are a number of acceptable methods of screening for this type of cancer. Each test has its own benefits and drawbacks. Discuss with your doctor what is most appropriate for you during your annual wellness visit. The different tests include: colonoscopy (considered the best screening method), a fecal occult blood test, a fecal DNA test, and sigmoidoscopy. 
 
-A bone mass density test is recommended when a woman turns 65 to screen for osteoporosis. This test is only recommended one time, as a screening. Some providers will use this same test as a disease monitoring tool if you already have osteoporosis. -Breast cancer screenings are recommended every other year for women of normal risk, age 54-69. 
-Cervical cancer screenings for women over age 72 are only recommended with certain risk factors. Here is a list of your current Health Maintenance items (your personalized list of preventive services) with a due date: 
Health Maintenance Due Topic Date Due  
 Hemoglobin A1C    02/20/2019  Albumin Urine Test  08/20/2019  Cholesterol Test   08/20/2019 [facility] Diabetic Foot Care  08/23/2019  Glaucoma Screening   09/11/2020

## 2020-08-28 ENCOUNTER — HOSPITAL ENCOUNTER (OUTPATIENT)
Dept: LAB | Age: 80
Discharge: HOME | End: 2020-08-28
Payer: MEDICARE

## 2020-08-28 PROCEDURE — 82043 UR ALBUMIN QUANTITATIVE: CPT

## 2020-08-28 PROCEDURE — 36415 COLL VENOUS BLD VENIPUNCTURE: CPT

## 2020-08-28 PROCEDURE — 85025 COMPLETE CBC W/AUTO DIFF WBC: CPT

## 2020-08-28 PROCEDURE — 80061 LIPID PANEL: CPT

## 2020-08-28 PROCEDURE — 84443 ASSAY THYROID STIM HORMONE: CPT

## 2020-08-28 PROCEDURE — 80053 COMPREHEN METABOLIC PANEL: CPT

## 2020-08-28 PROCEDURE — 83036 HEMOGLOBIN GLYCOSYLATED A1C: CPT

## 2020-08-28 PROCEDURE — 84436 ASSAY OF TOTAL THYROXINE: CPT

## 2020-09-01 LAB
ALBUMIN SERPL-MCNC: 4.7 G/DL (ref 3.6–4.6)
ALBUMIN/CREAT UR: 12 MG/G CREAT (ref 0–29)
ALBUMIN/GLOB SERPL: 2.1 {RATIO} (ref 1.2–2.2)
ALP SERPL-CCNC: 88 IU/L (ref 39–117)
ALT SERPL-CCNC: 16 IU/L (ref 0–32)
AST SERPL-CCNC: 22 IU/L (ref 0–40)
BASOPHILS # BLD AUTO: 0.1 X10E3/UL (ref 0–0.2)
BASOPHILS NFR BLD AUTO: 1 %
BILIRUB SERPL-MCNC: 0.4 MG/DL (ref 0–1.2)
BUN SERPL-MCNC: 12 MG/DL (ref 8–27)
BUN/CREAT SERPL: 15 (ref 12–28)
CALCIUM SERPL-MCNC: 9.5 MG/DL (ref 8.7–10.3)
CHLORIDE SERPL-SCNC: 103 MMOL/L (ref 96–106)
CHOLEST SERPL-MCNC: 120 MG/DL (ref 100–199)
CO2 SERPL-SCNC: 22 MMOL/L (ref 20–29)
CREAT SERPL-MCNC: 0.8 MG/DL (ref 0.57–1)
CREAT UR-MCNC: 112 MG/DL
EOSINOPHIL # BLD AUTO: 0.2 X10E3/UL (ref 0–0.4)
EOSINOPHIL NFR BLD AUTO: 2 %
ERYTHROCYTE [DISTWIDTH] IN BLOOD BY AUTOMATED COUNT: 13.7 % (ref 11.7–15.4)
EST. AVERAGE GLUCOSE BLD GHB EST-MCNC: 174 MG/DL
GLOBULIN SER CALC-MCNC: 2.2 G/DL (ref 1.5–4.5)
GLUCOSE SERPL-MCNC: 176 MG/DL (ref 65–99)
HBA1C MFR BLD: 7.7 % (ref 4.8–5.6)
HCT VFR BLD AUTO: 35.9 % (ref 34–46.6)
HDLC SERPL-MCNC: 55 MG/DL
HGB BLD-MCNC: 11.6 G/DL (ref 11.1–15.9)
IMM GRANULOCYTES # BLD AUTO: 0.1 X10E3/UL (ref 0–0.1)
IMM GRANULOCYTES NFR BLD AUTO: 1 %
LDLC SERPL CALC-MCNC: 43 MG/DL (ref 0–99)
LYMPHOCYTES # BLD AUTO: 1.6 X10E3/UL (ref 0.7–3.1)
LYMPHOCYTES NFR BLD AUTO: 20 %
MCH RBC QN AUTO: 28.8 PG (ref 26.6–33)
MCHC RBC AUTO-ENTMCNC: 32.3 G/DL (ref 31.5–35.7)
MCV RBC AUTO: 89 FL (ref 79–97)
MICROALBUMIN UR-MCNC: 13.8 UG/ML
MONOCYTES # BLD AUTO: 0.5 X10E3/UL (ref 0.1–0.9)
MONOCYTES NFR BLD AUTO: 7 %
NEUTROPHILS # BLD AUTO: 5.9 X10E3/UL (ref 1.4–7)
NEUTROPHILS NFR BLD AUTO: 69 %
PLATELET # BLD AUTO: 222 X10E3/UL (ref 150–450)
POTASSIUM SERPL-SCNC: 4.8 MMOL/L (ref 3.5–5.2)
PROT SERPL-MCNC: 6.9 G/DL (ref 6–8.5)
RBC # BLD AUTO: 4.03 X10E6/UL (ref 3.77–5.28)
SODIUM SERPL-SCNC: 143 MMOL/L (ref 134–144)
T4 SERPL-MCNC: 7.7 UG/DL (ref 4.5–12)
TRIGL SERPL-MCNC: 109 MG/DL (ref 0–149)
TSH SERPL DL<=0.005 MIU/L-ACNC: 5.37 UIU/ML (ref 0.45–4.5)
VLDLC SERPL CALC-MCNC: 22 MG/DL (ref 5–40)
WBC # BLD AUTO: 8.4 X10E3/UL (ref 3.4–10.8)

## 2020-09-04 DIAGNOSIS — R79.89 ABNORMAL THYROID BLOOD TEST: Primary | ICD-10-CM

## 2020-09-04 DIAGNOSIS — E07.9 THYROID DISEASE: ICD-10-CM

## 2020-09-04 NOTE — PROGRESS NOTES
Notify pt one of her thyroid test is mildly abnormal and would recommend rechecking thyroid test in 6 weeks and if still abnormal would recommend she start  thyroid supplement. Lab work is ordered. Continue with medications and diabetic diet. hgba1c 7.7.  cholesterol is at goal.

## 2020-09-08 ENCOUNTER — TELEPHONE (OUTPATIENT)
Dept: INTERNAL MEDICINE CLINIC | Age: 80
End: 2020-09-08

## 2020-09-08 NOTE — TELEPHONE ENCOUNTER
Daniel Díaz ([facility]) [phone] (H)     Returning didier's call from friday

## 2020-09-11 ENCOUNTER — OFFICE VISIT (OUTPATIENT)
Dept: SURGERY | Age: 80
End: 2020-09-11
Payer: MEDICARE

## 2020-09-11 VITALS
WEIGHT: 165 LBS | HEIGHT: 60 IN | BODY MASS INDEX: 32.39 KG/M2 | SYSTOLIC BLOOD PRESSURE: 143 MMHG | TEMPERATURE: 97.7 F | HEART RATE: 88 BPM | DIASTOLIC BLOOD PRESSURE: 63 MMHG

## 2020-09-11 DIAGNOSIS — N64.52 BLOODY DISCHARGE FROM RIGHT NIPPLE: Primary | ICD-10-CM

## 2020-09-11 DIAGNOSIS — N64.52 DISCHARGE FROM LEFT NIPPLE: ICD-10-CM

## 2020-09-11 PROCEDURE — 99203 OFFICE O/P NEW LOW 30 MIN: CPT | Performed by: SURGERY

## 2020-09-11 PROCEDURE — G8399 PT W/DXA RESULTS DOCUMENT: HCPCS | Performed by: SURGERY

## 2020-09-11 PROCEDURE — G8427 DOCREV CUR MEDS BY ELIG CLIN: HCPCS | Performed by: SURGERY

## 2020-09-11 PROCEDURE — 1101F PT FALLS ASSESS-DOCD LE1/YR: CPT | Performed by: SURGERY

## 2020-09-11 PROCEDURE — 1090F PRES/ABSN URINE INCON ASSESS: CPT | Performed by: SURGERY

## 2020-09-11 PROCEDURE — G8432 DEP SCR NOT DOC, RNG: HCPCS | Performed by: SURGERY

## 2020-09-11 PROCEDURE — G8536 NO DOC ELDER MAL SCRN: HCPCS | Performed by: SURGERY

## 2020-09-11 PROCEDURE — G8419 CALC BMI OUT NRM PARAM NOF/U: HCPCS | Performed by: SURGERY

## 2020-09-11 RX ORDER — ACETAMINOPHEN 325 MG/1
TABLET ORAL
COMMUNITY

## 2020-09-11 NOTE — PATIENT INSTRUCTIONS
Nipple Discharge: Care Instructions Your Care Instructions Fluid leaking from one or both nipples when you are not breastfeeding is called nipple discharge. Clear, cloudy, or white discharge that appears only when you press on your nipple is usually normal. The more the nipple is pressed or stimulated, the more fluid appears. Yellow, green, or brown discharge is not normal and may be a symptom of an infection or other problem. Spontaneous discharge appears without pressing or stimulating the nipple. This is not normal unless you are pregnant or breastfeeding. It may be a side effect of a medicine, or it may be caused by other health problems. The treatment of spontaneous nipple discharge depends on what is causing it. You may need additional tests to find out what is causing the nipple discharge. Follow-up care is a key part of your treatment and safety. Be sure to make and go to all appointments, and call your doctor if you are having problems. It's also a good idea to know your test results and keep a list of the medicines you take. How can you care for yourself at home? · If your doctor gave you medicine, take it exactly as prescribed. Call your doctor if you think you are having a problem with your medicine. · Wear a supportive bra, such as a sports bra or jog bra. · Avoid stimulating your breast until you have your follow-up appointment. When should you call for help? Call your doctor now or seek immediate medical care if: 
  · You have symptoms of a breast infection, such as: 
? Increased pain, swelling, redness, or warmth around a breast. 
? Red streaks extending from the breast. 
? Pus draining from a breast. 
? A fever. Watch closely for changes in your health, and be sure to contact your doctor if: 
  · You notice any changes in your breast or discharge.  
  · You do not get better as expected. Where can you learn more? Go to http://idania-skip.info/ Enter M763 in the search box to learn more about \"Nipple Discharge: Care Instructions. \" Current as of: June 26, 2019               Content Version: 12.6 © 0101-7232 [employer]. Care instructions adapted under license by [employer] (which disclaims liability or warranty for this information). If you have questions about a medical condition or this instruction, always ask your healthcare professional. [address] any warranty or liability for your use of this information. Nipple Discharge: Care Instructions Your Care Instructions Fluid leaking from one or both nipples when you are not breastfeeding is called nipple discharge. Clear, cloudy, or white discharge that appears only when you press on your nipple is usually normal. The more the nipple is pressed or stimulated, the more fluid appears. Yellow, green, or brown discharge is not normal and may be a symptom of an infection or other problem. Spontaneous discharge appears without pressing or stimulating the nipple. This is not normal unless you are pregnant or breastfeeding. It may be a side effect of a medicine, or it may be caused by other health problems. The treatment of spontaneous nipple discharge depends on what is causing it. You may need additional tests to find out what is causing the nipple discharge. Follow-up care is a key part of your treatment and safety. Be sure to make and go to all appointments, and call your doctor if you are having problems. It's also a good idea to know your test results and keep a list of the medicines you take. How can you care for yourself at home? · If your doctor gave you medicine, take it exactly as prescribed. Call your doctor if you think you are having a problem with your medicine. · Wear a supportive bra, such as a sports bra or jog bra. · Avoid stimulating your breast until you have your follow-up appointment. When should you call for help? Call your doctor now or seek immediate medical care if: 
  · You have symptoms of a breast infection, such as: 
? Increased pain, swelling, redness, or warmth around a breast. 
? Red streaks extending from the breast. 
? Pus draining from a breast. 
? A fever. Watch closely for changes in your health, and be sure to contact your doctor if: 
  · You notice any changes in your breast or discharge.  
  · You do not get better as expected. Where can you learn more? Go to http://idania-skip.info/ Enter P203 in the search box to learn more about \"Nipple Discharge: Care Instructions. \" Current as of: June 26, 2019               Content Version: 12.6 © 0788-7096 [employer]. Care instructions adapted under license by [employer] (which disclaims liability or warranty for this information). If you have questions about a medical condition or this instruction, always ask your healthcare professional. [address] any warranty or liability for your use of this information.

## 2020-09-11 NOTE — PROGRESS NOTES
HISTORY OF PRESENT ILLNESS Rocco Zimmerman is a 78 y.o. female. HPI   NEW patient consult referred by Dr. Bobbi Nicolas for BILATERAL nipple discharge. With pressing on the counter or bumping breast, has nipple discharge. The RIGHT nipple has bloody discharge and the LEFT is clear to yellowish. She feels a lump to the outer RIGHT breast which started her pressing and checking breasts. Has had tenderness to her nipples. The discharge started about 4 or 5 months ago. Family History: Mother, breast cancer, dx in her late 66's. [facility] Results (most recent): 
Results from Hospital Encounter encounter on 07/29/20 [facility] 3D FRANCIS W MAMMO BI DX INCL CAD Narrative EXAM: [facility] 3D FRANCIS W MAMMO BI DX INCL CAD, US BREASTS LIMITED=<3 QUAD INDICATION: Bilateral nipple discharge, spontaneous on the left for one month. Mother with postmenopausal breast carcinoma. No hormones. COMPARISON: 2/20/2009 and 11/13/2007. TECHNIQUE: Diagnostic bilateral  digital standard and spot magnification MLO and 
CC views. Technique includes three-dimensional tomosynthesis. Computer aided 
detection (CAD) was utilized. Bilateral limited breast ultrasound (2 separate 
studies reported together). BREAST COMPOSITION: There are scattered fibroglandular densities (approximately 25-50% glandular). FINDINGS: Tissues are unchanged as 2009 given difference in technique. Benign 
calcifications have increased in size and number since 2009. No skin thickening 
or nipple retraction. No suspicious mass, cluster of pleomorphic calcifications, 
or architectural distortion to suggest malignancy. Bilateral ultrasound: In the retroareolar bilateral breasts, there are mildly 
dilated ducts. Minimal debris in the ducts. No measurable mass. No abnormal 
blood flow. No suspicious mass or pathologic shadowing. No cyst. 
  
 Impression IMPRESSION:  
1. No mammographic or sonographic evidence of malignancy. 2. Bilateral retroareolar dilated milk ducts. Recommendation: 
Screening mammogram in one year if clinically indicated taking into account age 
and other medical conditions. Consider breast surgical consultation to discuss 
the nipple discharge and any further evaluation of the dilated ducts. BI-RADS Assessment Category 2: Benign finding. I gave results to the patient at the completion of the study. Review of Systems HENT: Positive for hearing loss and tinnitus. Eyes: Positive for blurred vision. Cardiovascular: Positive for leg swelling. Musculoskeletal: Positive for joint pain and neck pain. Skin: Positive for itching. Neurological: Positive for dizziness and headaches. Psychiatric/Behavioral: The patient is nervous/anxious. All other systems reviewed and are negative. Physical Exam 
 
ASSESSMENT and PLAN 
{ASSESSMENT/PLAN:13651}

## 2020-09-11 NOTE — PROGRESS NOTES
HISTORY OF PRESENT ILLNESS  John Pantoja is a 78 y.o. female. HPI  NEW patient consult referred by Dr. Tonja Mclean for BILATERAL nipple discharge. With pressing on the counter or bumping breast, has nipple discharge. The RIGHT nipple has bloody discharge (with expression) and the LEFT is clear to yellowish (spontaneous). She feels a lump to the outer RIGHT breast which started her pressing and checking breasts. Has had tenderness to her nipples. The discharge started about 4 or 5 months ago. Pt notes hx of LEFT breast bx.     Family History: Mother, breast cancer, dx in her late 66's.          [facility] Results (most recent):       Results from Hospital Encounter encounter on 07/29/20   [facility] 3D FRANCIS W MAMMO BI DX INCL CAD     Narrative EXAM: [facility] 3D FRANCIS W MAMMO BI DX INCL CAD, US BREASTS LIMITED=<3 QUAD     INDICATION: Bilateral nipple discharge, spontaneous on the left for one month. Mother with postmenopausal breast carcinoma. No hormones.     COMPARISON: 2/20/2009 and 11/13/2007.     TECHNIQUE: Diagnostic bilateral  digital standard and spot magnification MLO and  CC views. Technique includes three-dimensional tomosynthesis. Computer aided  detection (CAD) was utilized. Bilateral limited breast ultrasound (2 separate  studies reported together).     BREAST COMPOSITION: There are scattered fibroglandular densities (approximately  25-50% glandular).     FINDINGS: Tissues are unchanged as 2009 given difference in technique. Benign  calcifications have increased in size and number since 2009. No skin thickening  or nipple retraction. No suspicious mass, cluster of pleomorphic calcifications,  or architectural distortion to suggest malignancy.     Bilateral ultrasound: In the retroareolar bilateral breasts, there are mildly  dilated ducts. Minimal debris in the ducts. No measurable mass. No abnormal  blood flow. No suspicious mass or pathologic shadowing. No cyst.        Impression IMPRESSION:   1.  No mammographic or sonographic evidence of malignancy. 2. Bilateral retroareolar dilated milk ducts.     Recommendation:  Screening mammogram in one year if clinically indicated taking into account age  and other medical conditions. Consider breast surgical consultation to discuss  the nipple discharge and any further evaluation of the dilated ducts.     BI-RADS Assessment Category 2: Benign finding.     I gave results to the patient at the completion of the study.        Past Medical History:   Diagnosis Date    Diabetes ([address]     adult-onset    DVT, lower extremity ([address]     IBS (irritable bowel syndrome)     Irregular heart beat     (PVCs)    MVP (mitral valve prolapse)     h/o (neg ECHO-3/06)    Thromboembolus (HCC)        Past Surgical History:   Procedure Laterality Date    HX HEENT      tonsilectomy    HX HYSTERECTOMY      partial Hysterectomy    HX ORTHOPAEDIC      left knee-arthroscopic    HX ORTHOPAEDIC      carpal tunnel    HX OTHER SURGICAL Left     wrist, middle finger-trigger     LISA STEREO  BX BREAST LT 1ST LESION W/CLIP AND SPECIMEN Left     mult benign needle bx's over the years       Social History     Socioeconomic History    Marital status:      Spouse name: Not on file    Number of children: Not on file    Years of education: Not on file    Highest education level: Not on file   Occupational History    Not on file   Social Needs    Financial resource strain: Not on file    Food insecurity     Worry: Not on file     Inability: Not on file    Transportation needs     Medical: Not on file     Non-medical: Not on file   Tobacco Use    Smoking status: Never Smoker    Smokeless tobacco: Never Used   Substance and Sexual Activity    Alcohol use:  Yes     Alcohol/week: 0.0 standard drinks     Comment: occassionally    Drug use: No    Sexual activity: Never   Lifestyle    Physical activity     Days per week: Not on file     Minutes per session: Not on file    Stress: Not on file   Relationships    Social connections     Talks on phone: Not on file     Gets together: Not on file     Attends Taoism service: Not on file     Active member of club or organization: Not on file     Attends meetings of clubs or organizations: Not on file     Relationship status: Not on file    Intimate partner violence     Fear of current or ex partner: Not on file     Emotionally abused: Not on file     Physically abused: Not on file     Forced sexual activity: Not on file   Other Topics Concern    Not on file   Social History Narrative    Not on file       Current Outpatient Medications on File Prior to Visit   Medication Sig Dispense Refill    acetaminophen (TYLENOL) 325 mg tablet Take  by mouth every four (4) hours as needed for Pain.  atorvastatin (LIPITOR) 10 mg tablet Take  by mouth daily.  sertraline (ZOLOFT) 50 mg tablet Take 1 Tab by mouth daily. 90 Tab 1    metFORMIN ER (GLUCOPHAGE XR) 750 mg tablet Take 1 Tab by mouth two (2) times daily (with meals). 180 Tab 3    glipiZIDE (GLUCOTROL) 5 mg tablet Take 1 and half tabs with breakfast and 1 tab with dinner (Patient taking differently: Take 5 mg by mouth. Take 1 tablet with breakfast and 1 tab with dinner) 225 Tab 3    amLODIPine (NORVASC) 5 mg tablet TAKE ONE TABLET BY MOUTH ONE TIME DAILY  12    cyanocobalamin (VITAMIN B-12) 500 mcg tablet Take 500 mcg by mouth two (2) times a day.  biotin 2,500 mcg tab Take 10,000 mcg by mouth.  Cholecalciferol, Vitamin D3, (VITAMIN D3) 1,000 unit cap Take 1,000 Units by mouth.  MULTIVITAMIN PO Take 1 Tab by mouth daily.  aspirin delayed-release 81 mg tablet Take 81 mg by mouth daily. Every other day      naproxen sodium (ALEVE) 220 mg cap Take 1 Tab by mouth daily as needed.       glucose blood VI test strips (ONETOUCH ULTRA BLUE TEST STRIP) strip Check blood sugar twice a day E11.9 150 Strip 3    triamcinolone acetonide (KENALOG) 0.1 % topical cream Apply  to affected area two (2) times a day. use thin layer 45 g 0    clobetasol (TEMOVATE) 0.05 % external solution USE ON SCALP 1-2 TIMES DAILY AS NEEDED  5    nitroglycerin (NITROSTAT) 0.4 mg SL tablet 0.4 mg by SubLINGual route every five (5) minutes as needed for Chest Pain. Up to 3 doses.  fexofenadine (ALLEGRA) 180 mg tablet Take 1 Tab by mouth daily. 30 Tab 4    Blood-Glucose Meter monitoring kit Check BG 2 times/day; onetouch meter; E11.9 1 Kit 0    glucose blood VI test strips (BLOOD GLUCOSE TEST) strip Check BG 2 times/day 100 Strip 11    diclofenac (VOLTAREN) 1 % gel Apply  to affected area four (4) times daily.  fluticasone (FLONASE) 50 mcg/actuation nasal spray 2 Sprays by Both Nostrils route daily. 1 Bottle 0     No current facility-administered medications on file prior to visit. Allergies   Allergen Reactions    Januvia [Sitagliptin] Rash    B12 [Cyanocobalamin-Cobamamide] Hives     w/injection    Decongestant [Brompheniramine Maleate] Other (comments)     heart    Metrizamide Hives     w/injection    Brompheniramine Rash     heart       ROS  HENT: Positive for hearing loss and tinnitus. Eyes: Positive for blurred vision. Cardiovascular: Positive for leg swelling. Musculoskeletal: Positive for joint pain and neck pain. Skin: Positive for itching. Neurological: Positive for dizziness and headaches. Psychiatric/Behavioral: The patient is nervous/anxious. All other systems reviewed and are negative. Physical Exam  Exam conducted with a chaperone present. Cardiovascular:      Rate and Rhythm: Normal rate and regular rhythm. Heart sounds: Normal heart sounds. Pulmonary:      Breath sounds: Normal breath sounds. Chest:      Breasts: Breasts are symmetrical.         Right: Nipple discharge present. No swelling, bleeding, inverted nipple, mass, skin change or tenderness. Left: Nipple discharge present.  No swelling, bleeding, inverted nipple, mass, skin change or tenderness. Lymphadenopathy:      Cervical:      Right cervical: No superficial, deep or posterior cervical adenopathy. Left cervical: No superficial, deep or posterior cervical adenopathy. Upper Body:      Right upper body: No supraclavicular or axillary adenopathy. Left upper body: No supraclavicular or axillary adenopathy. ASSESSMENT and PLAN    ICD-10-CM ICD-9-CM    1. Bloody discharge from right nipple  N64.52 611.79    2. Discharge from left nipple  N64.52 611.79       New patient presents for evaluation of BL nipple discharge, and is doing well overall. Questionable fullness of the RIGHT nipple noted on exam, with BL pathologic uniorificial nipple discharge: bloody on the RIGHT, clear on the LEFT. Probable intraductal papilloma vs. possible DCIS. Discussed BL ductal excision to confirm PATH and rule out malignancy. This will be an outpatient procedure. Pt understands and consents to surgery. Will schedule for the near future at [facility], and scheduling will f/u with the date. This plan was reviewed with the patient and patient agrees. All questions were answered.     Written by Everardo Field, as dictated by Dr. Rossana Apple MD.

## 2020-10-02 ENCOUNTER — HOSPITAL ENCOUNTER (OUTPATIENT)
Dept: LAB | Age: 80
Discharge: HOME | End: 2020-10-02
Payer: MEDICARE

## 2020-10-02 PROCEDURE — 84443 ASSAY THYROID STIM HORMONE: CPT

## 2020-10-02 PROCEDURE — 36415 COLL VENOUS BLD VENIPUNCTURE: CPT

## 2020-10-02 PROCEDURE — 84439 ASSAY OF FREE THYROXINE: CPT

## 2020-10-03 LAB
T4 FREE SERPL-MCNC: 1.23 NG/DL (ref 0.82–1.77)
TSH SERPL DL<=0.005 MIU/L-ACNC: 4.3 UIU/ML (ref 0.45–4.5)

## 2020-10-07 ENCOUNTER — VIRTUAL VISIT (OUTPATIENT)
Dept: SLEEP MEDICINE | Age: 80
End: 2020-10-07
Payer: MEDICARE

## 2020-10-07 ENCOUNTER — DOCUMENTATION ONLY (OUTPATIENT)
Dept: SLEEP MEDICINE | Age: 80
End: 2020-10-07

## 2020-10-07 DIAGNOSIS — G47.33 OBSTRUCTIVE SLEEP APNEA (ADULT) (PEDIATRIC): Primary | ICD-10-CM

## 2020-10-07 DIAGNOSIS — I49.1 ATRIAL PREMATURE DEPOLARIZATION: ICD-10-CM

## 2020-10-07 DIAGNOSIS — E11.9 CONTROLLED TYPE 2 DIABETES MELLITUS WITHOUT COMPLICATION, WITHOUT LONG-TERM CURRENT USE OF INSULIN (HCC): ICD-10-CM

## 2020-10-07 PROCEDURE — 99442 PR PHYS/QHP TELEPHONE EVALUATION 11-20 MIN: CPT | Performed by: NURSE PRACTITIONER

## 2020-10-07 NOTE — PROGRESS NOTES
[address] [facility] [place]. [place], [address]   Tel.  [phone]   Fax. [address] [facility] [address]   [place], [address] [facility]   Tel.  [phone]   Fax. [phone] [address] [facility] Jessenia Espinoza    Tel.  [phone]   Fax. [phone]       Subjective:     Kathryn Bernard, who was evaluated through a patient-initiated, synchronous (real-time) audio only encounter, and/or her healthcare decision maker, is aware that it is a billable service, with coverage as determined by her insurance carrier. She provided verbal consent to proceed: Yes. She has not had a related appointment within my department in the past 7 days or scheduled within the next 24 hours. Total Time: minutes: 11-20 minutes    I was in the office while conducting this encounter. Patient verified with demographics. Kathryn Bernard is a 78 y.o. female, evaluated via audio-only technology on 10/7/2020 for  a positive airway pressure follow-up, last seen by Dr. Wilbur Jacobs on 10/2/2019, prior notes reviewed in detail. Home sleep test 7/2015 showed AHI of 15.9/hr with a lowest SaO2 of 77%, duration of SaO2 < 88% 33 min. She  is seen today for follow up on device set up 8/6/2015. She is not interested with a new device at this time but will contact us if she starts to have issues with the device. She reports no problems using the device. The following concerns reviewed:    Drowsiness no Problems exhaling no   Snoring no Forget to put on no   Mask Comfortable yes Can't fall asleep no   Dry Mouth no Mask falls off no   Air Leaking no Frequent awakenings no       She admits that her sleep has improved on PAP therapy using nasal mask and non-heated tubing. Review of device download indicated:  Auto pressure: 5-10 cmH2O; Peak Avg Pressure: 10.0 cmH2O;  Avg. Device Pressure <= 90 %: 9.4 cmH2O    Average % Night in Large Leak:  1.0  % Used Days >= 4 hours: 100.   Avg hours used: 7:49.    Therapy Apnea Index averaged over PAP use: 3.2 /hr which reflects improved sleep breathing condition. [place] Sleepiness Score: 2 and Modified F.O.S.Q. Score Total / 2: 18.5 which reflects improved sleep quality over therapy time. Allergies   Allergen Reactions    Januvia [Sitagliptin] Rash    B12 [Cyanocobalamin-Cobamamide] Hives     w/injection    Decongestant [Brompheniramine Maleate] Other (comments)     heart    Metrizamide Hives     w/injection    Brompheniramine Rash     heart       She has a current medication list which includes the following prescription(s): atorvastatin, sertraline, metformin er, glipizide, amlodipine, fexofenadine, fluticasone propionate, aspirin delayed-release, acetaminophen, naproxen sodium, glucose blood vi test strips, triamcinolone acetonide, clobetasol, nitroglycerin, blood-glucose meter, glucose blood vi test strips, diclofenac, cyanocobalamin, biotin, cholecalciferol, and multivitamin. .      She  has a past medical history of Diabetes ([address] DVT, lower extremity ([address] IBS (irritable bowel syndrome), Irregular heart beat, MVP (mitral valve prolapse), and Thromboembolus ([address] Sleep Review of Systems: notable for Negative difficulty falling asleep; Negative awakenings at night; occasional early morning headaches; Negative memory problems; Negative concentration issues; Negative chest pain; Negative shortness of breath; Negative significant joint pain at night; Negative significant muscle pain at night; Negative rashes or itching; Negative heartburn; Negative significant mood issues; 0 afternoon naps per week    Objective:   Vitals reported by patient     Patient-Reported Vitals 10/7/2020   Patient-Reported Weight 164 lbs      Calculated BMI 32    Physical Exam not completed due to audio only visit. Assessment:       ICD-10-CM ICD-9-CM    1. Obstructive sleep apnea (adult) (pediatric)  G47.33 327.23 AMB SUPPLY ORDER   2.  Atrial premature depolarization I49.1 427.61    3. Controlled type 2 diabetes mellitus without complication, without long-term current use of insulin (HCC)  E11.9 250.00    4. Adult BMI 32.0-32.9 kg/sq m  Z68.32 V85.32        AHI = 15.9(7/2015). On APAP, Respironics :  5-10 cmH2O. She is adherent with PAP therapy and PAP continues to benefit patient and remains necessary for control of her sleep apnea. Plan:     Follow-up and Dispositions    · Return in about 1 year (around 10/7/2021). *  Continue on current pressures    * Supplies ordered - nasal mask and non-heated tubing    Orders Placed This Encounter    AMB SUPPLY ORDER     Diagnosis: (G47.33) FELIX (obstructive sleep apnea)  (primary encounter diagnosis)     Replacement Supplies for Positive Airway Pressure Therapy Device:   Duration of need: 99 months.  Nasal Cushion (Replace) 2 per month.  Nasal Interface Mask 1 every 3 months.  Pos Airway pressure chin strap   Headgear 1 every 6 months.  Tubing with non-heating element 1 every 3 months.  Filter(s) Disposable 2 per month.  Filter(s) Non-Disposable 1 every 6 months. .   [facility] for Humidifier (Replace) 1 every 6 months. JIA Chase-BC; NPI: [id number]    Electronically signed. Date:- 10/07/20       * Counseling was provided regarding the importance of regular PAP use with emphasis on ensuring sufficient total sleep time, proper sleep hygiene, and safe driving. * Re-enforced proper and regular cleaning for the device. * She was asked to contact our office for any problems regarding PAP therapy. 2.  Atrial Fibrilation - continue on her current regimen. I have reviewed the relationship between heart arrhythmias and sleep disordered breathing. 3. Recommended a dedicated weight loss program through appropriate diet and exercise regimen as significant weight reduction has been shown to reduce severity of obstructive sleep apnea. Patient's phone number [phone] (home)  was reviewed and confirmed for accuracy. She gives permission for messages regarding results and appointments to be left at that number. Pursuant to the emergency declaration under the [facility], [address] [place] [address] authority and the [employer] and [employer] Act, this telephone encounter was conducted with patient's (and/or legal guardian's) consent, to reduce the risk of exposure to COVID-19 and provide necessary medical care. Services were provided through a telephone call to substitute for in-person encounter. JIA Carcamo-BC, [address] [place] [address]  Electronically signed.  10/07/20

## 2020-10-07 NOTE — PATIENT INSTRUCTIONS
217 [facility]., Timothy. 1668 [facility], [address] Tel.  [phone] Fax. 100 [facility] [address] [place], [address] [facility] Tel.  [phone] Fax. [phone] [address] Jessenia Ochoa Tel.  [phone] Fax. [phone] Learning About CPAP for Sleep Apnea What is CPAP? CPAP is a small machine that you use at home every night while you sleep. It increases air pressure in your throat to keep your airway open. When you have sleep apnea, this can help you sleep better so you feel much better. CPAP stands for \"continuous positive airway pressure. \" The CPAP machine will have one of the following: · A mask that covers your nose and mouth · Prongs that fit into your nose · A mask that covers your nose only, the most common type. This type is called NCPAP. The N stands for \"nasal.\" Why is it done? CPAP is usually the best treatment for obstructive sleep apnea. It is the first treatment choice and the most widely used. Your doctor may suggest CPAP if you have: · Moderate to severe sleep apnea. · Sleep apnea and coronary artery disease (CAD) or heart failure. How does it help? · CPAP can help you have more normal sleep, so you feel less sleepy and more alert during the daytime. · CPAP may help keep heart failure or other heart problems from getting worse. · NCPAP may help lower your blood pressure. · If you use CPAP, your bed partner may also sleep better because you are not snoring or restless. What are the side effects? Some people who use CPAP have: · A dry or stuffy nose and a sore throat. · Irritated skin on the face. · Sore eyes. · Bloating. If you have any of these problems, work with your doctor to fix them. Here are some things you can try: · Be sure the mask or nasal prongs fit well. · See if your doctor can adjust the pressure of your CPAP. · If your nose is dry, try a humidifier. · If your nose is runny or stuffy, try decongestant medicine or a steroid nasal spray. If these things do not help, you might try a different type of machine. Some machines have air pressure that adjusts on its own. Others have air pressures that are different when you breathe in than when you breathe out. This may reduce discomfort caused by too much pressure in your nose. Where can you learn more? Go to Glance App.be Enter K283 in the search box to learn more about \"Learning About CPAP for Sleep Apnea. \"  
© 2822-8437 Healthwise, Incorporated. Care instructions adapted under license by [facility] (which disclaims liability or warranty for this information). This care instruction is for use with your licensed healthcare professional. If you have questions about a medical condition or this instruction, always ask your healthcare professional. [address] any warranty or liability for your use of this information. Content Version: 6.8.01737; Last Revised: January 11, 2010 PROPER SLEEP HYGIENE What to avoid · Do not have drinks with caffeine, such as coffee or black tea, for 8 hours before bed. · Do not smoke or use other types of tobacco near bedtime. Nicotine is a stimulant and can keep you awake. · Avoid drinking alcohol late in the evening, because it can cause you to wake in the middle of the night. · Do not eat a big meal close to bedtime. If you are hungry, eat a light snack. · Do not drink a lot of water close to bedtime, because the need to urinate may wake you up during the night. · Do not read or watch TV in bed. Use the bed only for sleeping and sexual activity. What to try · Go to bed at the same time every night, and wake up at the same time every morning. Do not take naps during the day. · Keep your bedroom quiet, dark, and cool. · Get regular exercise, but not within 3 to 4 hours of your bedtime. Ronold Kanner · Sleep on a comfortable pillow and mattress. · If watching the clock makes you anxious, turn it facing away from you so you cannot see the time. · If you worry when you lie down, start a worry book. Well before bedtime, write down your worries, and then set the book and your concerns aside. · Try meditation or other relaxation techniques before you go to bed. · If you cannot fall asleep, get up and go to another room until you feel sleepy. Do something relaxing. Repeat your bedtime routine before you go to bed again. · Make your house quiet and calm about an hour before bedtime. Turn down the lights, turn off the TV, log off the computer, and turn down the volume on music. This can help you relax after a busy day. Drowsy Driving: The [employer] cites drowsiness as a causing factor in more than 471,455 police reported crashes annually, resulting in 76,000 injuries and 1,500 deaths. Other surveys suggest 55% of people polled have driven while drowsy in the past year, 23% had fallen asleep but not crashed, 3% crashed, and 2% had and accident due to drowsy driving. Who is at risk? Young Drivers: One study of drowsy driving accidents states that 55% of the drivers were under 25 years. Of those, 75% were male. Shift Workers and Travelers: People who work overnight or travel across time zones frequently are at higher risk of experiencing Circadian Rhythm Disorders. They are trying to work and function when their body is programed to sleep. Sleep Deprived: Lack of sleep has a serious impact on your ability to pay attention or focus on a task. Consistently getting less than the average of 8 hours your body needs creates partial or cumulative sleep deprivation.   
Untreated Sleep Disorders: Sleep Apnea, Narcolepsy, R.L.S., and other sleep disorders (untreated) prevent a person from getting enough restful sleep. This leads to excessive daytime sleepiness and increases the risk for drowsy driving accidents by up to 7 times. Medications / Alcohol: Even over the counter medications can cause drowsiness. Medications that impair a drivers attention should have a warning label. Alcohol naturally makes you sleepy and on its own can cause accidents. Combined with excessive drowsiness its effects are amplified. Signs of Drowsy Driving: * You don't remember driving the last few miles * You may drift out of your lucia * You are unable to focus and your thoughts wander * You may yawn more often than normal 
 * You have difficulty keeping your eyes open / nodding off * Missing traffic signs, speeding, or tailgating Prevention-  
Good sleep hygiene, lifestyle and behavioral choices have the most impact on drowsy driving. There is no substitute for sleep and the average person requires 8 hours nightly. If you find yourself driving drowsy, stop and sleep. Consider the sleep hygiene tips provided during your visit as well. Medication Refill Policy: Refills for all medications require 1 week advance notice. Please have your pharmacy fax a refill request. We are unable to fax, or call in \"controled substance\" medications and you will need to pick these prescriptions up from our office. Linea Activation Thank you for requesting access to Linea. Please follow the instructions below to securely access and download your online medical record. Linea allows you to send messages to your doctor, view your test results, renew your prescriptions, schedule appointments, and more. How Do I Sign Up? 1. In your internet browser, go to [email or web address]. 2. Click on the First Time User? Click Here link in the Sign In box. You will see the New Member Sign Up page. 3. Enter your Linea Access Code exactly as it appears below.  You will not need to use this code after youve completed the sign-up process. If you do not sign up before the expiration date, you must request a new code. Shobutt Babiest Access Code: Activation code not generated Current Consulted Status: Active (This is the date your Consulted access code will ) 4. Enter the last four digits of your Social Security Number (xxxx) and Date of Birth (mm/dd/yyyy) as indicated and click Submit. You will be taken to the next sign-up page. 5. Create a Shobutt Babiest ID. This will be your Consulted login ID and cannot be changed, so think of one that is secure and easy to remember. 6. Create a Consulted password. You can change your password at any time. 7. Enter your Password Reset Question and Answer. This can be used at a later time if you forget your password. 8. Enter your e-mail address. You will receive e-mail notification when new information is available in [address]. 9. Click Sign Up. You can now view and download portions of your medical record. 10. Click the Download Summary menu link to download a portable copy of your medical information. Additional Information If you have questions, please call [phone]. Remember, Consulted is NOT to be used for urgent needs. For medical emergencies, dial 911.

## 2020-10-08 DIAGNOSIS — N64.52 BLOODY DISCHARGE FROM NIPPLE: Primary | ICD-10-CM

## 2020-10-14 ENCOUNTER — TRANSCRIBE ORDER (OUTPATIENT)
Dept: REGISTRATION | Age: 80
End: 2020-10-14

## 2020-10-14 ENCOUNTER — HOSPITAL ENCOUNTER (OUTPATIENT)
Dept: PREADMISSION TESTING | Age: 80
Discharge: HOME | End: 2020-10-14
Payer: MEDICARE

## 2020-10-14 VITALS
DIASTOLIC BLOOD PRESSURE: 72 MMHG | SYSTOLIC BLOOD PRESSURE: 136 MMHG | HEART RATE: 82 BPM | TEMPERATURE: 98.5 F | WEIGHT: 168.65 LBS | HEIGHT: 60 IN | BODY MASS INDEX: 33.11 KG/M2

## 2020-10-14 DIAGNOSIS — Z01.812 PRE-PROCEDURE LAB EXAM: Primary | ICD-10-CM

## 2020-10-14 DIAGNOSIS — N64.52 BLOODY DISCHARGE FROM NIPPLE: ICD-10-CM

## 2020-10-14 LAB
ATRIAL RATE: 68 BPM
CALCULATED P AXIS, ECG09: 20 DEGREES
CALCULATED R AXIS, ECG10: 13 DEGREES
CALCULATED T AXIS, ECG11: 31 DEGREES
DIAGNOSIS, 93000: NORMAL
P-R INTERVAL, ECG05: 192 MS
Q-T INTERVAL, ECG07: 390 MS
QRS DURATION, ECG06: 78 MS
QTC CALCULATION (BEZET), ECG08: 414 MS
VENTRICULAR RATE, ECG03: 68 BPM

## 2020-10-14 PROCEDURE — 93005 ELECTROCARDIOGRAM TRACING: CPT

## 2020-10-14 NOTE — PERIOP NOTES
PATIENT MADE AWARE OF NEED FOR COVID-19 TESTING WITHIN 96 HOURS OF SURGERY. PATIENT INSTRUCTED TO EXPECT A CALL TO SCHEDULE APPT FOR TEST. PATIENT INSTRUCTED TO SELF QUARANTINE BETWEEN TESTING AND ARRIVAL TIME DAY OF SURGERY. Patient verbalizes understanding of preoperative instructions:  Given skin prep chlorhexidine wipes-given written and verbal instructions on use. Pre-Operative Instructions    DO NOT EAT OR DRINK ANYTHING AFTER MIDNIGHT THE NIGHT BEFORE SURGERY.

## 2020-10-18 ENCOUNTER — HOSPITAL ENCOUNTER (OUTPATIENT)
Dept: PREADMISSION TESTING | Age: 80
Discharge: HOME | End: 2020-10-18
Payer: MEDICARE

## 2020-10-18 DIAGNOSIS — Z01.812 PRE-PROCEDURE LAB EXAM: ICD-10-CM

## 2020-10-18 PROCEDURE — 87635 SARS-COV-2 COVID-19 AMP PRB: CPT

## 2020-10-19 LAB — SARS-COV-2, COV2NT: NOT DETECTED

## 2020-10-22 ENCOUNTER — ANESTHESIA EVENT (OUTPATIENT)
Dept: MEDSURG UNIT | Age: 80
End: 2020-10-22
Payer: MEDICARE

## 2020-10-22 ENCOUNTER — HOSPITAL ENCOUNTER (OUTPATIENT)
Age: 80
Setting detail: OUTPATIENT SURGERY
Discharge: HOME | End: 2020-10-22
Attending: SURGERY | Admitting: SURGERY
Payer: MEDICARE

## 2020-10-22 ENCOUNTER — ANESTHESIA (OUTPATIENT)
Dept: MEDSURG UNIT | Age: 80
End: 2020-10-22
Payer: MEDICARE

## 2020-10-22 VITALS
RESPIRATION RATE: 16 BRPM | DIASTOLIC BLOOD PRESSURE: 60 MMHG | BODY MASS INDEX: 32.94 KG/M2 | TEMPERATURE: 98.2 F | OXYGEN SATURATION: 99 % | WEIGHT: 168.65 LBS | HEART RATE: 72 BPM | SYSTOLIC BLOOD PRESSURE: 119 MMHG

## 2020-10-22 DIAGNOSIS — N64.52 BILATERAL NIPPLE DISCHARGE: ICD-10-CM

## 2020-10-22 LAB
GLUCOSE BLD STRIP.AUTO-MCNC: 200 MG/DL (ref 65–100)
GLUCOSE BLD STRIP.AUTO-MCNC: 220 MG/DL (ref 65–100)
SERVICE CMNT-IMP: ABNORMAL
SERVICE CMNT-IMP: ABNORMAL

## 2020-10-22 PROCEDURE — 77030018836 HC SOL IRR NACL ICUM -A: Performed by: SURGERY

## 2020-10-22 PROCEDURE — 2709999900 HC NON-CHARGEABLE SUPPLY: Performed by: SURGERY

## 2020-10-22 PROCEDURE — 77030002933 HC SUT MCRYL J&J -A: Performed by: SURGERY

## 2020-10-22 PROCEDURE — 74011250636 HC RX REV CODE- 250/636: Performed by: NURSE PRACTITIONER

## 2020-10-22 PROCEDURE — 76210000035 HC AMBSU PH I REC 1 TO 1.5 HR: Performed by: SURGERY

## 2020-10-22 PROCEDURE — 77030002996 HC SUT SLK J&J -A: Performed by: SURGERY

## 2020-10-22 PROCEDURE — 74011000250 HC RX REV CODE- 250: Performed by: NURSE PRACTITIONER

## 2020-10-22 PROCEDURE — 74011250636 HC RX REV CODE- 250/636: Performed by: SURGERY

## 2020-10-22 PROCEDURE — 77030031139 HC SUT VCRL2 J&J -A: Performed by: SURGERY

## 2020-10-22 PROCEDURE — 19110 NIPPLE EXPLORATION: CPT | Performed by: SURGERY

## 2020-10-22 PROCEDURE — 76060000062 HC AMB SURG ANES 1 TO 1.5 HR: Performed by: SURGERY

## 2020-10-22 PROCEDURE — 82962 GLUCOSE BLOOD TEST: CPT

## 2020-10-22 PROCEDURE — 88307 TISSUE EXAM BY PATHOLOGIST: CPT

## 2020-10-22 PROCEDURE — 74011250636 HC RX REV CODE- 250/636: Performed by: ANESTHESIOLOGY

## 2020-10-22 PROCEDURE — 77030010507 HC ADH SKN DERMBND J&J -B: Performed by: SURGERY

## 2020-10-22 PROCEDURE — 77030041680 HC PNCL ELECSURG SMK EVAC CNMD -B: Performed by: SURGERY

## 2020-10-22 PROCEDURE — 74011000250 HC RX REV CODE- 250: Performed by: SURGERY

## 2020-10-22 PROCEDURE — 77030040361 HC SLV COMPR DVT MDII -B: Performed by: SURGERY

## 2020-10-22 PROCEDURE — 77030008684 HC TU ET CUF COVD -B: Performed by: ANESTHESIOLOGY

## 2020-10-22 PROCEDURE — 76030000001 HC AMB SURG OR TIME 1 TO 1.5: Performed by: SURGERY

## 2020-10-22 PROCEDURE — 77030026438 HC STYL ET INTUB CARD -A: Performed by: ANESTHESIOLOGY

## 2020-10-22 PROCEDURE — 77030011267 HC ELECTRD BLD COVD -A: Performed by: SURGERY

## 2020-10-22 PROCEDURE — 76210000046 HC AMBSU PH II REC FIRST 0.5 HR: Performed by: SURGERY

## 2020-10-22 PROCEDURE — 2709999900 HC NON-CHARGEABLE SUPPLY

## 2020-10-22 PROCEDURE — 77030040922 HC BLNKT HYPOTHRM STRY -A

## 2020-10-22 RX ORDER — FENTANYL CITRATE 50 UG/ML
50 INJECTION, SOLUTION INTRAMUSCULAR; INTRAVENOUS AS NEEDED
Status: COMPLETED | OUTPATIENT
Start: 2020-10-22 | End: 2020-10-22

## 2020-10-22 RX ORDER — FENTANYL CITRATE 50 UG/ML
25 INJECTION, SOLUTION INTRAMUSCULAR; INTRAVENOUS
Status: DISCONTINUED | OUTPATIENT
Start: 2020-10-22 | End: 2020-10-22 | Stop reason: HOSPADM

## 2020-10-22 RX ORDER — ONDANSETRON 2 MG/ML
INJECTION INTRAMUSCULAR; INTRAVENOUS AS NEEDED
Status: DISCONTINUED | OUTPATIENT
Start: 2020-10-22 | End: 2020-10-22 | Stop reason: HOSPADM

## 2020-10-22 RX ORDER — SODIUM CHLORIDE 0.9 % (FLUSH) 0.9 %
5-40 SYRINGE (ML) INJECTION EVERY 8 HOURS
Status: DISCONTINUED | OUTPATIENT
Start: 2020-10-22 | End: 2020-10-22 | Stop reason: HOSPADM

## 2020-10-22 RX ORDER — SODIUM CHLORIDE, SODIUM LACTATE, POTASSIUM CHLORIDE, CALCIUM CHLORIDE 600; 310; 30; 20 MG/100ML; MG/100ML; MG/100ML; MG/100ML
INJECTION, SOLUTION INTRAVENOUS
Status: DISCONTINUED | OUTPATIENT
Start: 2020-10-22 | End: 2020-10-22 | Stop reason: HOSPADM

## 2020-10-22 RX ORDER — SODIUM CHLORIDE, SODIUM LACTATE, POTASSIUM CHLORIDE, CALCIUM CHLORIDE 600; 310; 30; 20 MG/100ML; MG/100ML; MG/100ML; MG/100ML
75 INJECTION, SOLUTION INTRAVENOUS CONTINUOUS
Status: DISCONTINUED | OUTPATIENT
Start: 2020-10-22 | End: 2020-10-22 | Stop reason: HOSPADM

## 2020-10-22 RX ORDER — SODIUM CHLORIDE 9 MG/ML
50 INJECTION, SOLUTION INTRAVENOUS CONTINUOUS
Status: DISCONTINUED | OUTPATIENT
Start: 2020-10-22 | End: 2020-10-22 | Stop reason: HOSPADM

## 2020-10-22 RX ORDER — DIPHENHYDRAMINE HYDROCHLORIDE 50 MG/ML
12.5 INJECTION, SOLUTION INTRAMUSCULAR; INTRAVENOUS AS NEEDED
Status: DISCONTINUED | OUTPATIENT
Start: 2020-10-22 | End: 2020-10-22 | Stop reason: HOSPADM

## 2020-10-22 RX ORDER — MIDAZOLAM HYDROCHLORIDE 1 MG/ML
0.5 INJECTION, SOLUTION INTRAMUSCULAR; INTRAVENOUS
Status: DISCONTINUED | OUTPATIENT
Start: 2020-10-22 | End: 2020-10-22 | Stop reason: HOSPADM

## 2020-10-22 RX ORDER — PROPOFOL 10 MG/ML
INJECTION, EMULSION INTRAVENOUS AS NEEDED
Status: DISCONTINUED | OUTPATIENT
Start: 2020-10-22 | End: 2020-10-22 | Stop reason: HOSPADM

## 2020-10-22 RX ORDER — DEXAMETHASONE SODIUM PHOSPHATE 4 MG/ML
INJECTION, SOLUTION INTRA-ARTICULAR; INTRALESIONAL; INTRAMUSCULAR; INTRAVENOUS; SOFT TISSUE AS NEEDED
Status: DISCONTINUED | OUTPATIENT
Start: 2020-10-22 | End: 2020-10-22 | Stop reason: HOSPADM

## 2020-10-22 RX ORDER — BUPIVACAINE HYDROCHLORIDE 5 MG/ML
20 INJECTION, SOLUTION EPIDURAL; INTRACAUDAL ONCE
Status: DISCONTINUED | OUTPATIENT
Start: 2020-10-22 | End: 2020-10-22 | Stop reason: HOSPADM

## 2020-10-22 RX ORDER — MIDAZOLAM HYDROCHLORIDE 1 MG/ML
1 INJECTION, SOLUTION INTRAMUSCULAR; INTRAVENOUS AS NEEDED
Status: DISCONTINUED | OUTPATIENT
Start: 2020-10-22 | End: 2020-10-22 | Stop reason: HOSPADM

## 2020-10-22 RX ORDER — SODIUM CHLORIDE 0.9 % (FLUSH) 0.9 %
5-40 SYRINGE (ML) INJECTION AS NEEDED
Status: DISCONTINUED | OUTPATIENT
Start: 2020-10-22 | End: 2020-10-22 | Stop reason: HOSPADM

## 2020-10-22 RX ORDER — PHENYLEPHRINE HCL IN 0.9% NACL 0.4MG/10ML
SYRINGE (ML) INTRAVENOUS AS NEEDED
Status: DISCONTINUED | OUTPATIENT
Start: 2020-10-22 | End: 2020-10-22 | Stop reason: HOSPADM

## 2020-10-22 RX ORDER — HYDROMORPHONE HYDROCHLORIDE 1 MG/ML
0.2 INJECTION, SOLUTION INTRAMUSCULAR; INTRAVENOUS; SUBCUTANEOUS
Status: DISCONTINUED | OUTPATIENT
Start: 2020-10-22 | End: 2020-10-22 | Stop reason: HOSPADM

## 2020-10-22 RX ORDER — ONDANSETRON 2 MG/ML
4 INJECTION INTRAMUSCULAR; INTRAVENOUS AS NEEDED
Status: DISCONTINUED | OUTPATIENT
Start: 2020-10-22 | End: 2020-10-22 | Stop reason: HOSPADM

## 2020-10-22 RX ORDER — LIDOCAINE HYDROCHLORIDE 10 MG/ML
30 INJECTION INFILTRATION; PERINEURAL ONCE
Status: DISCONTINUED | OUTPATIENT
Start: 2020-10-22 | End: 2020-10-22 | Stop reason: HOSPADM

## 2020-10-22 RX ORDER — SUCCINYLCHOLINE CHLORIDE 20 MG/ML
INJECTION INTRAMUSCULAR; INTRAVENOUS AS NEEDED
Status: DISCONTINUED | OUTPATIENT
Start: 2020-10-22 | End: 2020-10-22 | Stop reason: HOSPADM

## 2020-10-22 RX ORDER — CEFAZOLIN SODIUM/WATER 2 G/20 ML
2 SYRINGE (ML) INTRAVENOUS
Status: COMPLETED | OUTPATIENT
Start: 2020-10-22 | End: 2020-10-22

## 2020-10-22 RX ORDER — HYDROCODONE BITARTRATE AND ACETAMINOPHEN 5; 325 MG/1; MG/1
1 TABLET ORAL
Qty: 12 TAB | Refills: 0 | Status: SHIPPED | OUTPATIENT
Start: 2020-10-22 | End: 2020-10-29

## 2020-10-22 RX ORDER — DEXMEDETOMIDINE HYDROCHLORIDE 100 UG/ML
INJECTION, SOLUTION INTRAVENOUS AS NEEDED
Status: DISCONTINUED | OUTPATIENT
Start: 2020-10-22 | End: 2020-10-22 | Stop reason: HOSPADM

## 2020-10-22 RX ORDER — LIDOCAINE HYDROCHLORIDE 20 MG/ML
INJECTION, SOLUTION EPIDURAL; INFILTRATION; INTRACAUDAL; PERINEURAL AS NEEDED
Status: DISCONTINUED | OUTPATIENT
Start: 2020-10-22 | End: 2020-10-22 | Stop reason: HOSPADM

## 2020-10-22 RX ORDER — LIDOCAINE HYDROCHLORIDE 10 MG/ML
0.1 INJECTION, SOLUTION EPIDURAL; INFILTRATION; INTRACAUDAL; PERINEURAL AS NEEDED
Status: DISCONTINUED | OUTPATIENT
Start: 2020-10-22 | End: 2020-10-22 | Stop reason: HOSPADM

## 2020-10-22 RX ORDER — MORPHINE SULFATE 10 MG/ML
2 INJECTION, SOLUTION INTRAMUSCULAR; INTRAVENOUS
Status: DISCONTINUED | OUTPATIENT
Start: 2020-10-22 | End: 2020-10-22 | Stop reason: HOSPADM

## 2020-10-22 RX ORDER — OXYCODONE AND ACETAMINOPHEN 5; 325 MG/1; MG/1
1 TABLET ORAL AS NEEDED
Status: DISCONTINUED | OUTPATIENT
Start: 2020-10-22 | End: 2020-10-22 | Stop reason: HOSPADM

## 2020-10-22 RX ORDER — ROCURONIUM BROMIDE 10 MG/ML
INJECTION, SOLUTION INTRAVENOUS AS NEEDED
Status: DISCONTINUED | OUTPATIENT
Start: 2020-10-22 | End: 2020-10-22 | Stop reason: HOSPADM

## 2020-10-22 RX ADMIN — FENTANYL CITRATE 50 MCG: 50 INJECTION, SOLUTION INTRAMUSCULAR; INTRAVENOUS at 08:50

## 2020-10-22 RX ADMIN — LIDOCAINE HYDROCHLORIDE 100 MG: 20 INJECTION, SOLUTION EPIDURAL; INFILTRATION; INTRACAUDAL; PERINEURAL at 09:01

## 2020-10-22 RX ADMIN — DEXMEDETOMIDINE HYDROCHLORIDE 4 MCG: 100 INJECTION, SOLUTION, CONCENTRATE INTRAVENOUS at 09:22

## 2020-10-22 RX ADMIN — DEXMEDETOMIDINE HYDROCHLORIDE 4 MCG: 100 INJECTION, SOLUTION, CONCENTRATE INTRAVENOUS at 09:43

## 2020-10-22 RX ADMIN — Medication 80 MCG: at 09:44

## 2020-10-22 RX ADMIN — Medication 80 MCG: at 09:14

## 2020-10-22 RX ADMIN — PROPOFOL 100 MG: 10 INJECTION, EMULSION INTRAVENOUS at 09:01

## 2020-10-22 RX ADMIN — ONDANSETRON HYDROCHLORIDE 4 MG: 2 INJECTION, SOLUTION INTRAMUSCULAR; INTRAVENOUS at 09:06

## 2020-10-22 RX ADMIN — Medication 80 MCG: at 09:50

## 2020-10-22 RX ADMIN — DEXMEDETOMIDINE HYDROCHLORIDE 6 MCG: 100 INJECTION, SOLUTION, CONCENTRATE INTRAVENOUS at 09:39

## 2020-10-22 RX ADMIN — FENTANYL CITRATE 50 MCG: 50 INJECTION, SOLUTION INTRAMUSCULAR; INTRAVENOUS at 09:01

## 2020-10-22 RX ADMIN — SODIUM CHLORIDE, SODIUM LACTATE, POTASSIUM CHLORIDE, AND CALCIUM CHLORIDE 75 ML/HR: 600; 310; 30; 20 INJECTION, SOLUTION INTRAVENOUS at 08:26

## 2020-10-22 RX ADMIN — Medication 100 MCG: at 09:57

## 2020-10-22 RX ADMIN — Medication 80 MCG: at 09:27

## 2020-10-22 RX ADMIN — Medication 80 MCG: at 09:08

## 2020-10-22 RX ADMIN — Medication 2 G: at 09:06

## 2020-10-22 RX ADMIN — DEXAMETHASONE SODIUM PHOSPHATE 4 MG: 4 INJECTION, SOLUTION INTRAMUSCULAR; INTRAVENOUS at 09:05

## 2020-10-22 RX ADMIN — ROCURONIUM BROMIDE 10 MG: 10 SOLUTION INTRAVENOUS at 09:01

## 2020-10-22 RX ADMIN — SODIUM CHLORIDE, POTASSIUM CHLORIDE, SODIUM LACTATE AND CALCIUM CHLORIDE: 600; 310; 30; 20 INJECTION, SOLUTION INTRAVENOUS at 08:53

## 2020-10-22 RX ADMIN — SUCCINYLCHOLINE CHLORIDE 140 MG: 20 INJECTION, SOLUTION INTRAMUSCULAR; INTRAVENOUS at 09:01

## 2020-10-22 NOTE — ANESTHESIA PREPROCEDURE EVALUATION
Relevant Problems   No relevant active problems       Anesthetic History   No history of anesthetic complications            Review of Systems / Medical History  Patient summary reviewed, nursing notes reviewed and pertinent labs reviewed    Pulmonary        Sleep apnea: CPAP           Neuro/Psych         TIA and psychiatric history    Comments: Chronic prescription benzodiazepine use   carpal tunnel  Cardiovascular            Dysrhythmias : PVC      Exercise tolerance: >4 METS     GI/Hepatic/Renal               Comments: IBS Endo/Other    Diabetes: poorly controlled    Obesity, blood dyscrasia and arthritis     Other Findings              Physical Exam    Airway  Mallampati: IV  TM Distance: 4 - 6 cm  Neck ROM: decreased range of motion   Mouth opening: Diminished (comment)     Cardiovascular  Regular rate and rhythm,  S1 and S2 normal,  no murmur, click, rub, or gallop  Rhythm: regular  Rate: normal         Dental    Dentition: Caps/crowns     Pulmonary  Breath sounds clear to auscultation               Abdominal  GI exam deferred       Other Findings            Anesthetic Plan    ASA: 3  Anesthesia type: general          Induction: Intravenous  Anesthetic plan and risks discussed with: Patient      CMAC

## 2020-10-22 NOTE — H&P
HISTORY OF PRESENT ILLNESS  Lowell Kanner is a 78 y.o. female. HPI  NEW patient consult referred by Dr. Vernia Babinski nipple discharge.  With pressing on the counter or bumping breast, has nipple discharge.  The RIGHT nipple has bloody discharge (with expression) and the LEFT is clear to yellowish (spontaneous).  She feels a lump to the outer RIGHT breast which started her pressing and checking breasts. Has had tenderness to her nipples.  The discharge started about 4 or 5 months ago.      Pt notes hx of LEFT breast bx.     Family History: Mother, breast cancer, dx in her late 78's.          [facility] Results (most recent):          Results from Hospital Encounter encounter on 07/29/20   [facility] 3D FRANCIS W MAMMO BI DX INCL CAD     Narrative EXAM: [facility] 3D FRANCIS W MAMMO BI DX INCL CAD, US BREASTS LIMITED=<3 QUAD     INDICATION: Bilateral nipple discharge, spontaneous on the left for one month. Mother with postmenopausal breast carcinoma. No hormones.     COMPARISON: 2/20/2009 and 11/13/2007.     TECHNIQUE: Diagnostic bilateral  digital standard and spot magnification MLO and  CC views. Technique includes three-dimensional tomosynthesis. Computer aided  detection (CAD) was utilized. Bilateral limited breast ultrasound (2 separate  studies reported together).     BREAST COMPOSITION: There are scattered fibroglandular densities (approximately  25-50% glandular).     FINDINGS: Tissues are unchanged as 2009 given difference in technique. Benign  calcifications have increased in size and number since 2009. No skin thickening  or nipple retraction. No suspicious mass, cluster of pleomorphic calcifications,  or architectural distortion to suggest malignancy.     Bilateral ultrasound: In the retroareolar bilateral breasts, there are mildly  dilated ducts. Minimal debris in the ducts. No measurable mass. No abnormal  blood flow. No suspicious mass or pathologic shadowing. No cyst.        Impression IMPRESSION:   1.  No mammographic or sonographic evidence of malignancy. 2. Bilateral retroareolar dilated milk ducts.     Recommendation:  Screening mammogram in one year if clinically indicated taking into account age  and other medical conditions. Consider breast surgical consultation to discuss  the nipple discharge and any further evaluation of the dilated ducts.     BI-RADS Assessment Category 2: Benign finding.     I gave results to the patient at the completion of the study.              Past Medical History:   Diagnosis Date    Diabetes ([address]       adult-onset    DVT, lower extremity (HCC)      IBS (irritable bowel syndrome)      Irregular heart beat       (PVCs)    MVP (mitral valve prolapse)       h/o (neg ECHO-3/06)    Thromboembolus ([address]                Past Surgical History:   Procedure Laterality Date    HX HEENT         tonsilectomy    HX HYSTERECTOMY         partial Hysterectomy    HX ORTHOPAEDIC         left knee-arthroscopic    HX ORTHOPAEDIC         carpal tunnel    HX OTHER SURGICAL Left       wrist, middle finger-trigger     LISA STEREO  BX BREAST LT 1ST LESION W/CLIP AND SPECIMEN Left       mult benign needle bx's over the years        Social History            Socioeconomic History    Marital status:        Spouse name: Not on file    Number of children: Not on file    Years of education: Not on file    Highest education level: Not on file   Occupational History    Not on file   Social Needs    Financial resource strain: Not on file    Food insecurity       Worry: Not on file       Inability: Not on file    Transportation needs       Medical: Not on file       Non-medical: Not on file   Tobacco Use    Smoking status: Never Smoker    Smokeless tobacco: Never Used   Substance and Sexual Activity    Alcohol use:  Yes       Alcohol/week: 0.0 standard drinks       Comment: occassionally    Drug use: No    Sexual activity: Never   Lifestyle    Physical activity       Days per week: Not on file       Minutes per session: Not on file    Stress: Not on file   Relationships    Social connections       Talks on phone: Not on file       Gets together: Not on file       Attends Cheondoism service: Not on file       Active member of club or organization: Not on file       Attends meetings of clubs or organizations: Not on file       Relationship status: Not on file    Intimate partner violence       Fear of current or ex partner: Not on file       Emotionally abused: Not on file       Physically abused: Not on file       Forced sexual activity: Not on file   Other Topics Concern    Not on file   Social History Narrative    Not on file               Current Outpatient Medications on File Prior to Visit   Medication Sig Dispense Refill    acetaminophen (TYLENOL) 325 mg tablet Take  by mouth every four (4) hours as needed for Pain.        atorvastatin (LIPITOR) 10 mg tablet Take  by mouth daily.        sertraline (ZOLOFT) 50 mg tablet Take 1 Tab by mouth daily. 90 Tab 1    metFORMIN ER (GLUCOPHAGE XR) 750 mg tablet Take 1 Tab by mouth two (2) times daily (with meals). 180 Tab 3    glipiZIDE (GLUCOTROL) 5 mg tablet Take 1 and half tabs with breakfast and 1 tab with dinner (Patient taking differently: Take 5 mg by mouth. Take 1 tablet with breakfast and 1 tab with dinner) 225 Tab 3    amLODIPine (NORVASC) 5 mg tablet TAKE ONE TABLET BY MOUTH ONE TIME DAILY   12    cyanocobalamin (VITAMIN B-12) 500 mcg tablet Take 500 mcg by mouth two (2) times a day.        biotin 2,500 mcg tab Take 10,000 mcg by mouth.        Cholecalciferol, Vitamin D3, (VITAMIN D3) 1,000 unit cap Take 1,000 Units by mouth.        MULTIVITAMIN PO Take 1 Tab by mouth daily.        aspirin delayed-release 81 mg tablet Take 81 mg by mouth daily.  Every other day        naproxen sodium (ALEVE) 220 mg cap Take 1 Tab by mouth daily as needed.        glucose blood VI test strips (ONETOUCH ULTRA BLUE TEST STRIP) strip Check blood sugar twice a day E11.9 150 Strip 3    triamcinolone acetonide (KENALOG) 0.1 % topical cream Apply  to affected area two (2) times a day. use thin layer 45 g 0    clobetasol (TEMOVATE) 0.05 % external solution USE ON SCALP 1-2 TIMES DAILY AS NEEDED   5    nitroglycerin (NITROSTAT) 0.4 mg SL tablet 0.4 mg by SubLINGual route every five (5) minutes as needed for Chest Pain. Up to 3 doses.        fexofenadine (ALLEGRA) 180 mg tablet Take 1 Tab by mouth daily. 30 Tab 4    Blood-Glucose Meter monitoring kit Check BG 2 times/day; onetouch meter; E11.9 1 Kit 0    glucose blood VI test strips (BLOOD GLUCOSE TEST) strip Check BG 2 times/day 100 Strip 11    diclofenac (VOLTAREN) 1 % gel Apply  to affected area four (4) times daily.        fluticasone (FLONASE) 50 mcg/actuation nasal spray 2 Sprays by Both Nostrils route daily. 1 Bottle 0      No current facility-administered medications on file prior to visit.               Allergies   Allergen Reactions    Januvia [Sitagliptin] Rash    B12 [Cyanocobalamin-Cobamamide] Hives       w/injection    Decongestant [Brompheniramine Maleate] Other (comments)       heart    Metrizamide Hives       w/injection    Brompheniramine Rash       heart        ROS  HENT: Positive for hearing loss and tinnitus.    Eyes: Positive for blurred vision. Cardiovascular: Positive for leg swelling. Musculoskeletal: Positive for joint pain and neck pain. Skin: Positive for itching. Neurological: Positive for dizziness and headaches. Psychiatric/Behavioral: The patient is nervous/anxious.    All other systems reviewed and are negative.     Physical Exam  Exam conducted with a chaperone present. Cardiovascular:      Rate and Rhythm: Normal rate and regular rhythm. Heart sounds: Normal heart sounds. Pulmonary:      Breath sounds: Normal breath sounds. Chest:      Breasts: Breasts are symmetrical.         Right: Nipple discharge present.  No swelling, bleeding, inverted nipple, mass, skin change or tenderness. Left: Nipple discharge present. No swelling, bleeding, inverted nipple, mass, skin change or tenderness. Lymphadenopathy:      Cervical:      Right cervical: No superficial, deep or posterior cervical adenopathy. Left cervical: No superficial, deep or posterior cervical adenopathy. Upper Body:      Right upper body: No supraclavicular or axillary adenopathy. Left upper body: No supraclavicular or axillary adenopathy.       ASSESSMENT and PLAN     ICD-10-CM ICD-9-CM     1. Bloody discharge from right nipple  N64.52 611.79     2. Discharge from left nipple  N64.52 611.79        New patient presents for evaluation of BL nipple discharge, and is doing well overall. Questionable fullness of the RIGHT nipple noted on exam, with BL pathologic uniorificial nipple discharge: bloody on the RIGHT, clear on the LEFT. Probable intraductal papilloma vs. possible DCIS. Discussed BL ductal excision to confirm PATH and rule out malignancy. This will be an outpatient procedure. Pt understands and consents to surgery. Will schedule for the near future at [facility], and scheduling will f/u with the date. This plan was reviewed with the patient and patient agrees. All questions were answered.

## 2020-10-22 NOTE — ANESTHESIA POSTPROCEDURE EVALUATION
Procedure(s):  BILATERAL BREAST DUCTAL EXCISIONS. general    Anesthesia Post Evaluation        Patient location during evaluation: PACU  Patient participation: complete - patient participated  Level of consciousness: awake and alert  Pain management: adequate  Airway patency: patent  Anesthetic complications: no  Cardiovascular status: acceptable  Respiratory status: acceptable  Hydration status: acceptable  Comments: Seen, no complaints   Post anesthesia nausea and vomiting:  none  Final Post Anesthesia Temperature Assessment:  Normothermia (36.0-37.5 degrees C)      INITIAL Post-op Vital signs:   Vitals Value Taken Time   /60 10/22/2020 11:00 AM   Temp 36.8 °C (98.2 °F) 10/22/2020 10:13 AM   Pulse 73 10/22/2020 11:02 AM   Resp 11 10/22/2020 11:02 AM   SpO2 95 % 10/22/2020 11:02 AM   Vitals shown include unvalidated device data.

## 2020-10-22 NOTE — OP NOTES
[address] [place]   OPERATIVE REPORT    Name:  Sue Segovia  MR#:  [id number]  :  1940  ACCOUNT #:  [de-identified]  DATE OF SERVICE:  10/22/2020    PREOPERATIVE DIAGNOSIS:  Bilateral pathologic nipple discharge. POSTOPERATIVE DIAGNOSIS:  Bilateral pathologic nipple discharge. PROCEDURE PERFORMED:  Bilateral ductal excision. SURGEON:  Farzana Robins MD    ASSISTANT:  Susan Antony    ANESTHESIA:  General.    COMPLICATIONS:  None. SPECIMENS REMOVED:  Bilateral breast tissue. IMPLANTS:  None. ESTIMATED BLOOD LOSS:  Minimal.    INDICATIONS:  The patient is an 18-year-old female who has a clear nipple discharge from the left and bloody nipple discharge from the right, both of which are pathologic and uniorificial.  She was admitted for bilateral ductal excision. PROCEDURE:  After satisfactory induction of general LMA anesthesia, the patient was prepped and draped in sterile fashion. Attention was turned to the left side first where a circumareolar incision was made and deepened through the subcutaneous tissue with Bovie cautery. The offending duct was disconnected from the back wall of the nipple and excised with Bovie cautery. The specimen was oriented and sent to pathology. All dissection planes were hemostatic. The wound was anesthetized with 0.5% plain Marcaine and closed in two layers of 3-0 Vicryl and a running subcuticular 4-0 Monocryl on skin. Attention was then returned to the right where a circumareolar incision was made. It was deepened through the subcutaneous tissue with Bovie cautery. The offending duct was disconnected off the back of the nipple and a small anterior margin was taken sharply off the back of the nipple. The ductal excision was performed and the specimen removed. There was additional bleeding from the duct laterally, so additional posterolateral margin was obtained and oriented. All specimens were oriented and sent to pathology. All dissection planes were hemostatic. The wound was anesthetized with 0.5% plain Marcaine, closed with inner layer of 3-0 Vicryl and a running subcuticular 4-0 Monocryl on the skin. The patient tolerated the procedure well with no complications. She was taken to the recovery room in stable condition. Radha Ferreira MD JP/[email or web address]_[email or web address]_IN/[email or web address]  D:  10/22/2020 10:01  T:  10/22/2020 19:26  JOB #:  [id number]  CC:   MD Garrett Baumann MD Doneen Barn, MD       [facility] CenterMD Tim MD

## 2020-10-22 NOTE — DISCHARGE INSTRUCTIONS
Discharge Instructions from Dr. Pool Austin    · I will call you with the pathology results, typically within 1 week from today. · You may shower, but no hot tubs, swimming pools, or baths until your incision is healed. · No heavy lifting with the affected extremity (nothing greater than 5 pounds), and limit its use for the next 4-5 days. · You may use an ice pack for comfort for the next couple of days, but do not place ice directly on the skin. Rather, use a towel or clothing to serve as a barrier between skin and ice to prevent injury. · If I placed a drain, follow the drain instructions provided, especially as you keep a record of the drain output. · Follow medication instructions carefully. · Watch for signs of infection as listed below. · Redness  · Swelling  · Drainage from the incision or from your nipple that appears infected  · Fever over 101 degrees for consecutive readings, or over 99.5 if you are currently undergoing chemotherapy. · Call our office (number is below) for a follow-up appointment. · If you have any problems, our phone number is [phone].     Resume your diabetic treatment routine

## 2020-10-22 NOTE — BRIEF OP NOTE
Brief Postoperative Note    Patient: Xiang Main  YOB: 1940  MRN: [id number]    Date of Procedure: 10/22/2020     Pre-Op Diagnosis: BLOODY NIPPLE DISCHARGE    Post-Op Diagnosis: Same as preoperative diagnosis.       Procedure(s):  BILATERAL BREAST DUCTAL EXCISIONS    Surgeon(s):  Erin Kilgore MD    Surgical Assistant: None    Anesthesia: General     Estimated Blood Loss (mL): Minimal    Complications: None    Specimens:   ID Type Source Tests Collected by Time Destination   1 : LEFT DUCTAL EXCISION Fresh Breast  Erin Kilgore MD 10/22/2020 9355 Pathology   2 : ANTERIOR MARGIN of RIGHT DUCTAL EXCISION Fresh Breast  Erin Kilgore MD 10/22/2020 0137 Pathology   3 : RIGHT DUCTAL EXCISION Fresh Breast  Erin Kilgore MD 10/22/2020 4081 Pathology   4 : POSTERIOR LATERAL MARGIN of RIGHT DUCTAL EXCISION [place] Breast  Erin Kilgore MD 10/22/2020 0945 Pathology        Implants: * No implants in log *    Drains: * No LDAs found *    Findings: Clear discharge on left, bloody on right    Electronically Signed by Yan Neff MD on 68/46/4062 at 9:55 AM

## 2020-10-22 NOTE — ROUTINE PROCESS
Patient: Fransisco Campoverde MRN: [id number]  SSN: [id number] YOB: 1940  Age: 78 y.o. Sex: female Patient is status post Procedure(s): BILATERAL BREAST DUCTAL EXCISIONS. Surgeon(s) and Role: Karrie Mesa MD - Primary Local/Dose/Irrigation:  sEE mar Peripheral IV 10/22/20 Left; Anterior Hand (Active) Site Assessment Clean, dry, & intact 10/22/20 3101 Phlebitis Assessment 0 10/22/20 0824 Infiltration Assessment 0 10/22/20 0824 Dressing Status Clean, dry, & intact 10/22/20 7901 Dressing Type Transparent 10/22/20 0824 Hub Color/Line Status [place] 10/22/20 8731 Airway - Endotracheal Tube 10/22/20 Oral (Active) Dressing/Packing:  Wound Breast-Dressing Type: Topical skin adhesive/glue (10/22/20 0900) Splint/Cast:  ] Other:

## 2020-10-26 ENCOUNTER — TELEPHONE (OUTPATIENT)
Dept: SURGERY | Age: 80
End: 2020-10-26

## 2020-11-13 ENCOUNTER — OFFICE VISIT (OUTPATIENT)
Dept: SURGERY | Age: 80
End: 2020-11-13
Payer: COMMERCIAL

## 2020-11-13 VITALS
TEMPERATURE: 97.8 F | HEART RATE: 82 BPM | SYSTOLIC BLOOD PRESSURE: 139 MMHG | BODY MASS INDEX: 32.98 KG/M2 | DIASTOLIC BLOOD PRESSURE: 64 MMHG | WEIGHT: 168 LBS | HEIGHT: 60 IN

## 2020-11-13 DIAGNOSIS — N60.91 ATYPICAL DUCTAL HYPERPLASIA OF RIGHT BREAST: ICD-10-CM

## 2020-11-13 DIAGNOSIS — D24.1 PAPILLOMA OF RIGHT BREAST: ICD-10-CM

## 2020-11-13 DIAGNOSIS — N60.11 FIBROCYSTIC BREAST CHANGES, BILATERAL: Primary | ICD-10-CM

## 2020-11-13 DIAGNOSIS — N60.12 FIBROCYSTIC BREAST CHANGES, BILATERAL: Primary | ICD-10-CM

## 2020-11-13 PROCEDURE — 99024 POSTOP FOLLOW-UP VISIT: CPT | Performed by: SURGERY

## 2020-11-13 NOTE — PATIENT INSTRUCTIONS

## 2020-11-13 NOTE — PROGRESS NOTES
HISTORY OF PRESENT ILLNESS  Mayte Fuentes is a 78 y.o. female. HPI  ESTABLISHED patient here for post op follow up, s/p BILATERAL duct excisions. 10/22/2020 - BILATERAL duct excisions    1. Left breast, duct excision:   Fibrocystic changes with ectatic ducts containing eosinophilic secretions   Negative for in situ or invasive carcinoma   2. Right breast, anterior margin, duct excision:   Benign breast tissue, negative for in situ or invasive carcinoma   3. Right breast, duct excision:   Microscopic intraductal papilloma with usual ductal hyperplasia   Fibrocystic changes with ectatic ducts containing eosinophilic secretions   Negative for in situ or invasive carcinoma   4.  Right breast, posterior lateral margin, duct excision:   Microscopic sclerosed intraductal papilloma with atypical ductal hyperplasia   Fibrocystic changes with ectatic ducts containing eosinophilic secretions and usual ductal hyperplasia   Negative for in situ or invasive carcinoma       Past Medical History:   Diagnosis Date    Arrhythmia     PVC AND MVP    Arthritis     Diabetes ([address]     adult-onset    DVT, lower extremity (HCC)     IBS (irritable bowel syndrome)     Irregular heart beat     (PVCs)    MVP (mitral valve prolapse)     h/o (neg ECHO-3/06)    Sleep apnea     WEARS CPAP    Stroke ([address] 2010    TIA-NO LASTING EFFECTS    Thromboembolus [facility])        Past Surgical History:   Procedure Laterality Date    HX BREAST LUMPECTOMY Bilateral 10/22/2020    BILATERAL BREAST DUCTAL EXCISIONS performed by [facility] at [facility]UL[facility] OR    HX HEENT      tonsilectomy    HX HYSTERECTOMY      partial Hysterectomy    HX KNEE REPLACEMENT Right 04/10/2019    HX ORTHOPAEDIC Left     left knee-arthroscopic    HX ORTHOPAEDIC Right     carpal tunnel    HX OTHER SURGICAL Left     wrist, middle finger-trigger     LISA STEREO  BX BREAST LT 1ST LESION W/CLIP AND SPECIMEN Left     mult benign needle bx's over the years       Social History     Socioeconomic History    Marital status:      Spouse name: Not on file    Number of children: Not on file    Years of education: Not on file    Highest education level: Not on file   Occupational History    Not on file   Social Needs    Financial resource strain: Not on file    Food insecurity     Worry: Not on file     Inability: Not on file    Transportation needs     Medical: Not on file     Non-medical: Not on file   Tobacco Use    Smoking status: Never Smoker    Smokeless tobacco: Never Used   Substance and Sexual Activity    Alcohol use: Yes     Alcohol/week: 0.0 standard drinks     Comment: occassionally    Drug use: No    Sexual activity: Never   Lifestyle    Physical activity     Days per week: Not on file     Minutes per session: Not on file    Stress: Not on file   Relationships    Social connections     Talks on phone: Not on file     Gets together: Not on file     Attends Bahai service: Not on file     Active member of club or organization: Not on file     Attends meetings of clubs or organizations: Not on file     Relationship status: Not on file    Intimate partner violence     Fear of current or ex partner: Not on file     Emotionally abused: Not on file     Physically abused: Not on file     Forced sexual activity: Not on file   Other Topics Concern    Not on file   Social History Narrative    Not on file       Current Outpatient Medications on File Prior to Visit   Medication Sig Dispense Refill    acetaminophen (TYLENOL) 325 mg tablet Take  by mouth every four (4) hours as needed for Pain.  atorvastatin (LIPITOR) 10 mg tablet Take  by mouth daily.  sertraline (ZOLOFT) 50 mg tablet Take 1 Tab by mouth daily. 90 Tab 1    naproxen sodium (ALEVE) 220 mg cap Take 1 Tab by mouth daily as needed.  metFORMIN ER (GLUCOPHAGE XR) 750 mg tablet Take 1 Tab by mouth two (2) times daily (with meals).  (Patient taking differently: Take 750 mg by mouth two (2) times daily (with meals). Indications: PT TAKES 500MG IN AM AND 1000MG IN EVENING) 180 Tab 3    glipiZIDE (GLUCOTROL) 5 mg tablet Take 1 and half tabs with breakfast and 1 tab with dinner (Patient taking differently: Take 5 mg by mouth. Take 1 tablet with breakfast and 1 tab with dinner) 225 Tab 3    nitroglycerin (NITROSTAT) 0.4 mg SL tablet 0.4 mg by SubLINGual route every five (5) minutes as needed for Chest Pain. Up to 3 doses.  amLODIPine (NORVASC) 5 mg tablet 5 mg nightly. 12    cyanocobalamin (VITAMIN B-12) 500 mcg tablet Take 500 mcg by mouth two (2) times a day.  biotin 2,500 mcg tab Take 10,000 mcg by mouth.  Cholecalciferol, Vitamin D3, (VITAMIN D3) 1,000 unit cap Take 1,000 Units by mouth.  MULTIVITAMIN PO Take 1 Tab by mouth daily.  aspirin delayed-release 81 mg tablet Take 81 mg by mouth daily. Every other day      glucose blood VI test strips (ONETOUCH ULTRA BLUE TEST STRIP) strip Check blood sugar twice a day E11.9 150 Strip 3    Blood-Glucose Meter monitoring kit Check BG 2 times/day; onetouch meter; E11.9 1 Kit 0    glucose blood VI test strips (BLOOD GLUCOSE TEST) strip Check BG 2 times/day 100 Strip 11     No current facility-administered medications on file prior to visit. Allergies   Allergen Reactions    Januvia [Sitagliptin] Rash    B12 [Cyanocobalamin-Cobamamide] Hives     w/injection    Decongestant [Brompheniramine Maleate] Other (comments)     heart    Metrizamide Hives     w/injection    Brompheniramine Rash     heart       ROS    Physical Exam  Exam conducted with a chaperone present. Cardiovascular:      Rate and Rhythm: Normal rate and regular rhythm. Heart sounds: Normal heart sounds. Pulmonary:      Breath sounds: Normal breath sounds. Chest:      Breasts: Breasts are symmetrical.         Right: Normal. No swelling, bleeding, inverted nipple, mass, nipple discharge, skin change or tenderness. Left: Normal. No swelling, bleeding, inverted nipple, mass, nipple discharge, skin change or tenderness. Lymphadenopathy:      Cervical:      Right cervical: No superficial, deep or posterior cervical adenopathy. Left cervical: No superficial, deep or posterior cervical adenopathy. Upper Body:      Right upper body: No supraclavicular or axillary adenopathy. Left upper body: No supraclavicular or axillary adenopathy. ASSESSMENT and PLAN    ICD-10-CM ICD-9-CM    1. Fibrocystic breast changes, bilateral  N60.11 610.1     N60.12     2. Papilloma of right breast  D24.1 217    3. Atypical ductal hyperplasia of right breast  N60.91 610.8       Patient presents for f/u s/p BL ductal excisions on 10/22/20, and is doing well overall. Well healed incisions bilaterally. Reviewed surgical PATH. No further treatment required. Pt should continue annual screening mammograms. F/U PRN. This plan was reviewed with the patient and patient agrees. All questions were answered.     Written by Kareem Hearn, as dictated by Dr. Chloe Kraus MD.

## 2020-11-13 NOTE — PROGRESS NOTES
HISTORY OF PRESENT ILLNESS Xiang Main is a 78 y.o. female. HPI  ESTABLISHED patient here for post op follow up, s/p BILATERAL duct excisions. 10/22/2020 - BILATERAL duct excisions 1. Left breast, duct excision:  
Fibrocystic changes with ectatic ducts containing eosinophilic secretions Negative for in situ or invasive carcinoma 2. Right breast, anterior margin, duct excision:  
Benign breast tissue, negative for in situ or invasive carcinoma 3. Right breast, duct excision:  
Microscopic intraductal papilloma with usual ductal hyperplasia Fibrocystic changes with ectatic ducts containing eosinophilic secretions Negative for in situ or invasive carcinoma 4. Right breast, posterior lateral margin, duct excision:  
Microscopic sclerosed intraductal papilloma with atypical ductal hyperplasia Fibrocystic changes with ectatic ducts containing eosinophilic secretions and usual ductal hyperplasia Negative for in situ or invasive carcinoma ROS Physical Exam 
 
ASSESSMENT and PLAN 
{ASSESSMENT/PLAN:[id number]}

## 2020-12-08 NOTE — PATIENT INSTRUCTIONS
Try increasing metformin to 1000mg at night. If your stomach doesn't tolerate that, resume the 500mg dose twice a day and try increasing your evening glipizide dose to 7.5mg. Send me a [employer] message and let me know which one works so I can update your prescriptions. no

## 2021-03-09 ENCOUNTER — OFFICE VISIT (OUTPATIENT)
Dept: INTERNAL MEDICINE CLINIC | Age: 81
End: 2021-03-09
Payer: MEDICARE

## 2021-03-09 ENCOUNTER — DOCUMENTATION ONLY (OUTPATIENT)
Dept: INTERNAL MEDICINE CLINIC | Age: 81
End: 2021-03-09

## 2021-03-09 VITALS
SYSTOLIC BLOOD PRESSURE: 138 MMHG | HEIGHT: 60 IN | BODY MASS INDEX: 32.75 KG/M2 | HEART RATE: 86 BPM | DIASTOLIC BLOOD PRESSURE: 63 MMHG | OXYGEN SATURATION: 98 % | RESPIRATION RATE: 16 BRPM | TEMPERATURE: 98 F | WEIGHT: 166.8 LBS

## 2021-03-09 DIAGNOSIS — Z86.718 HISTORY OF DVT (DEEP VEIN THROMBOSIS): ICD-10-CM

## 2021-03-09 DIAGNOSIS — F41.9 ANXIETY: ICD-10-CM

## 2021-03-09 DIAGNOSIS — Z01.810 PREOP CARDIOVASCULAR EXAM: Primary | ICD-10-CM

## 2021-03-09 DIAGNOSIS — I49.3 PVC (PREMATURE VENTRICULAR CONTRACTION): ICD-10-CM

## 2021-03-09 DIAGNOSIS — H25.9 SENILE CATARACT OF RIGHT EYE, UNSPECIFIED AGE-RELATED CATARACT TYPE: ICD-10-CM

## 2021-03-09 DIAGNOSIS — F32.A DEPRESSION, CONTROLLED: ICD-10-CM

## 2021-03-09 DIAGNOSIS — E11.9 TYPE 2 DIABETES MELLITUS WITHOUT COMPLICATION, WITHOUT LONG-TERM CURRENT USE OF INSULIN (HCC): ICD-10-CM

## 2021-03-09 PROCEDURE — G8427 DOCREV CUR MEDS BY ELIG CLIN: HCPCS | Performed by: INTERNAL MEDICINE

## 2021-03-09 PROCEDURE — 1090F PRES/ABSN URINE INCON ASSESS: CPT | Performed by: INTERNAL MEDICINE

## 2021-03-09 PROCEDURE — 1101F PT FALLS ASSESS-DOCD LE1/YR: CPT | Performed by: INTERNAL MEDICINE

## 2021-03-09 PROCEDURE — 93005 ELECTROCARDIOGRAM TRACING: CPT | Performed by: INTERNAL MEDICINE

## 2021-03-09 PROCEDURE — G8536 NO DOC ELDER MAL SCRN: HCPCS | Performed by: INTERNAL MEDICINE

## 2021-03-09 PROCEDURE — G8399 PT W/DXA RESULTS DOCUMENT: HCPCS | Performed by: INTERNAL MEDICINE

## 2021-03-09 PROCEDURE — G8510 SCR DEP NEG, NO PLAN REQD: HCPCS | Performed by: INTERNAL MEDICINE

## 2021-03-09 PROCEDURE — 99214 OFFICE O/P EST MOD 30 MIN: CPT | Performed by: INTERNAL MEDICINE

## 2021-03-09 PROCEDURE — G8417 CALC BMI ABV UP PARAM F/U: HCPCS | Performed by: INTERNAL MEDICINE

## 2021-03-09 PROCEDURE — G0463 HOSPITAL OUTPT CLINIC VISIT: HCPCS | Performed by: INTERNAL MEDICINE

## 2021-03-09 PROCEDURE — 93010 ELECTROCARDIOGRAM REPORT: CPT | Performed by: INTERNAL MEDICINE

## 2021-03-09 RX ORDER — GLIPIZIDE 5 MG/1
TABLET ORAL 2 TIMES DAILY
COMMUNITY

## 2021-03-09 NOTE — PROGRESS NOTES
HPI:  Jacque Menon is a 78y.o. year old female who presents today for preoperative medical evaluation. Procedure/Surgery:right and left cataract using local anesthesia  Date of Procedure/Surgery: 3/18/21 and 4/1/21 respectively Surgeon: Dr Beth Patel: [place]  Latex Allergy: no    Recent use of: ASA  Anesthesia Complications:  does report some palpitations after anesthesia in the past  History of abnormal bleeding : None  Distant hx of left DVT  Health Care Directive or Living Will: yes  Medical problems:   DM: followed by Dr Jordy Escobar and BS: 130-140 in morning but prior to lunch 200-210. Depression and anxiety is controlled with zoloft and no side effects. She reports history of palpitations in the past and has a history of PVCs. She is not currently having any palpitations but is concerned about the possibility of having them with anesthesia. Current Outpatient Medications   Medication Sig Dispense Refill    glipiZIDE (GLUCOTROL) 5 mg tablet Take  by mouth two (2) times a day.  acetaminophen (TYLENOL) 325 mg tablet Take  by mouth every four (4) hours as needed for Pain.  atorvastatin (LIPITOR) 10 mg tablet Take  by mouth daily.  sertraline (ZOLOFT) 50 mg tablet Take 1 Tab by mouth daily. 90 Tab 1    naproxen sodium (ALEVE) 220 mg cap Take 1 Tab by mouth daily as needed.  glucose blood VI test strips (ONETOUCH ULTRA BLUE TEST STRIP) strip Check blood sugar twice a day E11.9 150 Strip 3    metFORMIN ER (GLUCOPHAGE XR) 750 mg tablet Take 1 Tab by mouth two (2) times daily (with meals). (Patient taking differently: Take 750 mg by mouth two (2) times daily (with meals). Indications: PT TAKES 500MG IN AM AND 1000MG IN EVENING) 180 Tab 3    nitroglycerin (NITROSTAT) 0.4 mg SL tablet 0.4 mg by SubLINGual route every five (5) minutes as needed for Chest Pain. Up to 3 doses.  amLODIPine (NORVASC) 5 mg tablet 5 mg nightly.   12    Blood-Glucose Meter monitoring kit Check BG 2 times/day; onetouch meter; E11.9 1 Kit 0    cyanocobalamin (VITAMIN B-12) 500 mcg tablet Take 500 mcg by mouth two (2) times a day.  biotin 2,500 mcg tab Take 10,000 mcg by mouth.  Cholecalciferol, Vitamin D3, (VITAMIN D3) 1,000 unit cap Take 1,000 Units by mouth.  MULTIVITAMIN PO Take 1 Tab by mouth daily.  aspirin delayed-release 81 mg tablet Take 81 mg by mouth daily.  Every other day       Allergies   Allergen Reactions    Januvia [Sitagliptin] Rash    B12 [Cyanocobalamin-Cobamamide] Hives     w/injection    Decongestant [Brompheniramine Maleate] Other (comments)     heart    Metrizamide Hives     w/injection    Brompheniramine Rash     heart     Past Medical History:   Diagnosis Date    Arrhythmia     PVC AND MVP    Arthritis     Diabetes (HCC)     adult-onset    DVT, lower extremity (HCC)     IBS (irritable bowel syndrome)     Irregular heart beat     (PVCs)    MVP (mitral valve prolapse)     h/o (neg ECHO-3/06)    Sleep apnea     WEARS CPAP    Stroke ([address] 2010    TIA-NO LASTING EFFECTS    Thromboembolus [facility])      Past Surgical History:   Procedure Laterality Date    HX BREAST LUMPECTOMY Bilateral 10/22/2020    BILATERAL BREAST DUCTAL EXCISIONS performed by Joe Victoria [facility] at [facility] AM[facility]ULATORY OR    HX HEENT      tonsilectomy    HX HYSTERECTOMY      partial Hysterectomy    HX KNEE REPLACEMENT Right 04/10/2019    HX ORTHOPAEDIC Left     left knee-arthroscopic    HX ORTHOPAEDIC Right     carpal tunnel    HX OTHER SURGICAL Left     wrist, middle finger-trigger     LISA STEREO  BX BREAST LT 1ST LESION W/CLIP AND SPECIMEN Left     mult benign needle bx's over the years     Family History   Problem Relation Age of Onset    Heart Failure Father         CHF    Emphysema Father         (smoker)    COPD Father     Arthritis-osteo Mother     Arrhythmia Mother     Heart Attack Mother     Breast Cancer Mother         66's    Heart Attack Maternal Grandmother     Thyroid Disease Daughter     Thyroid Disease Daughter     Anesth Problems Neg Hx      Social History     Tobacco Use    Smoking status: Never Smoker    Smokeless tobacco: Never Used   Substance Use Topics    Alcohol use: Yes     Alcohol/week: 0.0 standard drinks     Comment: occassionally             Review of Systems   Constitutional: Negative for chills, fever and malaise/fatigue. HENT: Positive for congestion (nasal). Negative for sore throat. Respiratory: Negative for cough, shortness of breath and wheezing. Cardiovascular: Negative for chest pain and leg swelling. Gastrointestinal: Positive for diarrhea (has about every 10 days or so due to the metformin). Negative for abdominal pain, blood in stool, constipation, heartburn, nausea and vomiting. Genitourinary: Negative for dysuria, frequency, hematuria and urgency. Skin: Negative for rash. Neurological: Negative for sensory change, focal weakness and headaches. Psychiatric/Behavioral: Negative for depression. The patient is not nervous/anxious. Physical Exam  Constitutional:       General: She is not in acute distress. Appearance: She is well-developed. HENT:      Head: Normocephalic and atraumatic. Nose: Nose normal.   Eyes:      Conjunctiva/sclera: Conjunctivae normal.      Pupils: Pupils are equal, round, and reactive to light. Neck:      Musculoskeletal: Normal range of motion and neck supple. Thyroid: No thyromegaly. Cardiovascular:      Rate and Rhythm: Normal rate and regular rhythm. Heart sounds: Normal heart sounds. Pulmonary:      Effort: Pulmonary effort is normal. No respiratory distress. Breath sounds: Normal breath sounds. Abdominal:      General: Bowel sounds are normal.      Palpations: Abdomen is soft. Tenderness: There is no abdominal tenderness. Lymphadenopathy:      Cervical: No cervical adenopathy.         Visit Vitals  /63 (BP 1 Location: Left upper arm, BP Patient Position: Sitting, BP Cuff Size: Large adult)   Pulse 86   Temp 98 °F (36.7 °C) (Temporal)   Resp 16   Ht 5' (1.524 m)   Wt 166 lb 12.8 oz (75.7 kg)   SpO2 98%   BMI 32.58 kg/m²          Assessment & Plan:    ICD-10-CM ICD-9-CM    1. Preop cardiovascular exam   No contraindication to planned surgical procedure. Z01.810 V72.81 AMB POC EKG ROUTINE W/ 12 LEADS, INTER & REP   2. Senile cataract of right eye, unspecified age-related cataract type  H25.9 366.10    3. Type 2 diabetes mellitus without complication, without long-term current use of insulin (HCC)   Stable and managed by endocrine. E11.9 250.00 T4, FREE      TSH 3RD GENERATION      METABOLIC PANEL, COMPREHENSIVE      HEMOGLOBIN A1C WITH EAG      HEMOGLOBIN A1C WITH EAG      METABOLIC PANEL, COMPREHENSIVE      TSH 3RD GENERATION      T4, FREE      CANCELED: HEMOGLOBIN A1C WITH EAG      CANCELED: METABOLIC PANEL, COMPREHENSIVE      CANCELED: TSH 3RD GENERATION      CANCELED: T4, FREE   4. PVC (premature ventricular contraction)   EKG with no significant changes today. I49.3 427.69    5. Anxiety   Well-controlled F41.9 300.00    6. History of DVT (deep vein thrombosis) distant history Z86.718 V12.51    7. Depression, controlled  F32.9 311      Orders Placed This Encounter   • T4, FREE   • TSH 3RD GENERATION   • METABOLIC PANEL, COMPREHENSIVE   • HEMOGLOBIN A1C WITH EAG   • AMB POC EKG ROUTINE W/ 12 LEADS, INTER & REP             Advised her to call back or return to office if symptoms worsen/change/persist.  Discussed expected course/resolution/complications of diagnosis in detail with patient.    Medication risks/benefits/costs/interactions/alternatives discussed with patient.  She was given an after visit summary which includes diagnoses, current medications, & vitals.  She expressed understanding with the diagnosis and plan.

## 2021-03-09 NOTE — LETTER
3/17/2021 3:09 PM    Ms. Alan [phone]    Office Visit on 03/09/2021   Component Date Value Ref Range Status    Hemoglobin A1c 03/09/2021 7.8* 4.0 - 5.6 % Final    Comment: NEW METHOD PLEASE NOTE NEW REFERENCE RANGE  (NOTE)  HbA1C Interpretive Ranges  <5.7              Normal  5.7 - 6.4         Consider Prediabetes  >6.5              Consider Diabetes      Est. average glucose 03/09/2021 177  mg/dL Final    Sodium 03/09/2021 139  136 - 145 mmol/L Final    Potassium 03/09/2021 4.4  3.5 - 5.1 mmol/L Final    Chloride 03/09/2021 105  97 - 108 mmol/L Final    CO2 03/09/2021 26  21 - 32 mmol/L Final    Anion gap 03/09/2021 8  5 - 15 mmol/L Final    Glucose 03/09/2021 199* 65 - 100 mg/dL Final    BUN 03/09/2021 18  6 - 20 MG/DL Final    Creatinine 03/09/2021 0.87  0.55 - 1.02 MG/DL Final    BUN/Creatinine ratio 03/09/2021 21* 12 - 20   Final    GFR est AA 03/09/2021 >60  >60 ml/min/1.73m2 Final    GFR est non-AA 03/09/2021 >60  >60 ml/min/1.73m2 Final    Comment: Estimated GFR is calculated using the IDMS-traceable Modification of Diet in  Renal Disease (MDRD) Study equation, reported for both  Americans  (GFRAA) and non- Americans (GFRNA), and normalized to 1.73m2 body  surface area. The physician must decide which value applies to the patient.  Calcium 03/09/2021 9.1  8.5 - 10.1 MG/DL Final    Bilirubin, total 03/09/2021 0.4  0.2 - 1.0 MG/DL Final    ALT (SGPT) 03/09/2021 25  12 - 78 U/L Final    AST (SGOT) 03/09/2021 21  15 - 37 U/L Final    Alk.  phosphatase 03/09/2021 121* 45 - 117 U/L Final    Protein, total 03/09/2021 7.2  6.4 - 8.2 g/dL Final    Albumin 03/09/2021 4.0  3.5 - 5.0 g/dL Final    Globulin 03/09/2021 3.2  2.0 - 4.0 g/dL Final    A-G Ratio 03/09/2021 1.3  1.1 - 2.2   Final    TSH 03/09/2021 3.47  0.36 - 3.74 uIU/mL Final    Comment:   Due to TSH heterogeneity, both structurally and degree of glycosylation,  monoclonal antibodies used in the TSH assay may not accurately quantitate TSH. Therefore, this result should be correlated with clinical findings as well as  with other assessments of thyroid function, e.g., free T4, free T3.      T4, Free 03/09/2021 1.0  0.8 - 1.5 NG/DL Final     Recommendation: Overall your lab work looks good.  Your hemoglobin A1c is 7.8.  I will fax these labs over to your endocrinologist for his review also. Lencho Roberts continue with your current medications and diabetic diet.             Sincerely,      Jackeline Marcum MD

## 2021-03-10 LAB
ALBUMIN SERPL-MCNC: 4 G/DL (ref 3.5–5)
ALBUMIN/GLOB SERPL: 1.3 {RATIO} (ref 1.1–2.2)
ALP SERPL-CCNC: 121 U/L (ref 45–117)
ALT SERPL-CCNC: 25 U/L (ref 12–78)
ANION GAP SERPL CALC-SCNC: 8 MMOL/L (ref 5–15)
AST SERPL-CCNC: 21 U/L (ref 15–37)
BILIRUB SERPL-MCNC: 0.4 MG/DL (ref 0.2–1)
BUN SERPL-MCNC: 18 MG/DL (ref 6–20)
BUN/CREAT SERPL: 21 (ref 12–20)
CALCIUM SERPL-MCNC: 9.1 MG/DL (ref 8.5–10.1)
CHLORIDE SERPL-SCNC: 105 MMOL/L (ref 97–108)
CO2 SERPL-SCNC: 26 MMOL/L (ref 21–32)
CREAT SERPL-MCNC: 0.87 MG/DL (ref 0.55–1.02)
EST. AVERAGE GLUCOSE BLD GHB EST-MCNC: 177 MG/DL
GLOBULIN SER CALC-MCNC: 3.2 G/DL (ref 2–4)
GLUCOSE SERPL-MCNC: 199 MG/DL (ref 65–100)
HBA1C MFR BLD: 7.8 % (ref 4–5.6)
POTASSIUM SERPL-SCNC: 4.4 MMOL/L (ref 3.5–5.1)
PROT SERPL-MCNC: 7.2 G/DL (ref 6.4–8.2)
SODIUM SERPL-SCNC: 139 MMOL/L (ref 136–145)
T4 FREE SERPL-MCNC: 1 NG/DL (ref 0.8–1.5)
TSH SERPL DL<=0.05 MIU/L-ACNC: 3.47 UIU/ML (ref 0.36–3.74)

## 2021-04-28 ENCOUNTER — HOSPITAL ENCOUNTER (EMERGENCY)
Age: 81
Discharge: HOME | End: 2021-04-28
Attending: EMERGENCY MEDICINE | Admitting: EMERGENCY MEDICINE
Payer: MEDICARE

## 2021-04-28 VITALS
DIASTOLIC BLOOD PRESSURE: 62 MMHG | OXYGEN SATURATION: 95 % | HEART RATE: 93 BPM | TEMPERATURE: 97.8 F | SYSTOLIC BLOOD PRESSURE: 129 MMHG | RESPIRATION RATE: 20 BRPM

## 2021-04-28 DIAGNOSIS — L50.9 URTICARIA: Primary | ICD-10-CM

## 2021-04-28 DIAGNOSIS — R22.0 LIP SWELLING: ICD-10-CM

## 2021-04-28 PROCEDURE — 99283 EMERGENCY DEPT VISIT LOW MDM: CPT

## 2021-04-28 PROCEDURE — 74011250637 HC RX REV CODE- 250/637: Performed by: EMERGENCY MEDICINE

## 2021-04-28 RX ORDER — FAMOTIDINE 20 MG/1
20 TABLET, FILM COATED ORAL 2 TIMES DAILY
Qty: 10 TAB | Refills: 0 | Status: SHIPPED | OUTPATIENT
Start: 2021-04-28 | End: 2021-05-03

## 2021-04-28 RX ORDER — CETIRIZINE HCL 10 MG
10 TABLET ORAL DAILY
Qty: 10 TAB | Refills: 0 | Status: SHIPPED | OUTPATIENT
Start: 2021-04-28

## 2021-04-28 RX ORDER — CETIRIZINE HCL 10 MG
10 TABLET ORAL
Status: COMPLETED | OUTPATIENT
Start: 2021-04-28 | End: 2021-04-28

## 2021-04-28 RX ORDER — DEXAMETHASONE SODIUM PHOSPHATE 10 MG/ML
10 INJECTION INTRAMUSCULAR; INTRAVENOUS
Status: COMPLETED | OUTPATIENT
Start: 2021-04-28 | End: 2021-04-28

## 2021-04-28 RX ORDER — FAMOTIDINE 20 MG/1
20 TABLET, FILM COATED ORAL
Status: COMPLETED | OUTPATIENT
Start: 2021-04-28 | End: 2021-04-28

## 2021-04-28 RX ADMIN — DEXAMETHASONE SODIUM PHOSPHATE 10 MG: 10 INJECTION, SOLUTION INTRAMUSCULAR; INTRAVENOUS at 09:54

## 2021-04-28 RX ADMIN — CETIRIZINE HYDROCHLORIDE 10 MG: 10 TABLET, FILM COATED ORAL at 09:55

## 2021-04-28 RX ADMIN — FAMOTIDINE 20 MG: 20 TABLET, FILM COATED ORAL at 09:55

## 2021-04-28 NOTE — ED PROVIDER NOTES
HPI patient is an 53-year-old female with past medical history significant for arrhythmia, type 2 diabetes, DVT, IBS, sleep apnea and previous stroke who presents to the ED reporting intermittent scattered hives for 2 days and awaking this morning with right-sided upper and lower lip swelling. Denies any fever, tongue swelling, difficulty breathing, difficulty swallowing, SOB, chest pain or abdominal pain. Denies any nausea, vomiting or diarrhea. Pt. Reports that she has not had any food or medications this morning prior to arrival.  She denies any known changes in foods, medications, lotions, detergents or other known allergens.       Past Medical History:   Diagnosis Date    Arrhythmia     PVC AND MVP    Arthritis     Diabetes (HCC)     adult-onset    DVT, lower extremity (HCC)     IBS (irritable bowel syndrome)     Irregular heart beat     (PVCs)    MVP (mitral valve prolapse)     h/o (neg ECHO-3/06)    Sleep apnea     WEARS CPAP    Stroke ([facility] [address] 2010    TIA-NO LASTING EFFECTS    Thromboembolus [facility])        Past Surgical History:   Procedure Laterality Date    HX BREAST LUMPECTOMY Bilateral 10/22/2020    BILATERAL BREAST DUCTAL EXCISIONS performed by [facility]iqui Cruz [facility] at [facility] AM[facility]UL[facility]ORY OR    HX HEENT      tonsilectomy    HX HYSTERECTOMY      partial Hysterectomy    HX KNEE REPLACEMENT Right 04/10/2019    HX ORTHOPAEDIC Left     left knee-arthroscopic    HX ORTHOPAEDIC Right     carpal tunnel    HX OTHER SURGICAL Left     wrist, middle finger-trigger     LISA STEREO  BX BREAST LT 1ST LESION W/CLIP AND SPECIMEN Left     mult benign needle bx's over the years         Family History:   Problem Relation Age of Onset    Heart Failure Father         CHF    Emphysema Father         (smoker)    COPD Father     Arthritis-osteo Mother     Arrhythmia Mother     Heart Attack Mother     Breast Cancer Mother         66's    Heart Attack Maternal Grandmother     Thyroid Disease Daughter    24 [facility] [place] [facility]\"" Thyroid Disease Daughter     Anesth Problems Neg Hx        Social History     Socioeconomic History    Marital status:      Spouse name: Not on file    Number of children: Not on file    Years of education: Not on file    Highest education level: Not on file   Occupational History    Not on file   Social Needs    Financial resource strain: Not on file    Food insecurity     Worry: Not on file     Inability: Not on file    Transportation needs     Medical: Not on file     Non-medical: Not on file   Tobacco Use    Smoking status: Never Smoker    Smokeless tobacco: Never Used   Substance and Sexual Activity    Alcohol use: Yes     Alcohol/week: 0.0 standard drinks     Comment: occassionally    Drug use: No    Sexual activity: Never   Lifestyle    Physical activity     Days per week: Not on file     Minutes per session: Not on file    Stress: Not on file   Relationships    Social connections     Talks on phone: Not on file     Gets together: Not on file     Attends Roman [employer] service: Not on file     Active member of club or organization: Not on file     Attends meetings of clubs or organizations: Not on file     Relationship status: Not on file    Intimate partner violence     Fear of current or ex partner: Not on file     Emotionally abused: Not on file     Physically abused: Not on file     Forced sexual activity: Not on file   Other Topics Concern    Not on file   Social History Narrative    Not on file         ALLERGIES: Januvia [sitagliptin], B12 [cyanocobalamin-cobamamide], Decongestant [brompheniramine maleate], Metrizamide, and Brompheniramine    Review of Systems   Constitutional: Negative for activity change, appetite change, fever and unexpected weight change. HENT: Positive for facial swelling. Negative for congestion, sore throat and trouble swallowing. Respiratory: Negative for cough and shortness of breath.     Cardiovascular: Negative for chest pain, palpitations and leg swelling. Gastrointestinal: Negative for abdominal distention, abdominal pain, constipation, diarrhea, nausea and vomiting. Genitourinary: Negative for difficulty urinating, dysuria and flank pain. Musculoskeletal: Negative for back pain, myalgias and neck pain. Skin: Positive for rash. Neurological: Negative for dizziness, light-headedness and headaches. All other systems reviewed and are negative. Vitals:    04/28/21 0918   BP: (!) 142/69   Pulse: (!) 106   Resp: 16   Temp: 97 °F (36.1 °C)   SpO2: 99%            Physical Exam  Vitals signs and nursing note reviewed. Constitutional:       General: She is not in acute distress. Appearance: Normal appearance. She is not ill-appearing, toxic-appearing or diaphoretic. Comments: Elderly white female, type II diabetic, , non-smoker   HENT:      Mouth/Throat:      Mouth: Mucous membranes are moist.      Comments: Right-sided upper and lower lip swelling; tongue appears normal; airway patent and open  Neck:      Musculoskeletal: Normal range of motion and neck supple. Cardiovascular:      Rate and Rhythm: Normal rate and regular rhythm. Pulmonary:      Effort: Pulmonary effort is normal.      Breath sounds: Normal breath sounds. Abdominal:      General: Bowel sounds are normal.      Palpations: Abdomen is soft. Skin:     General: Skin is warm and dry. Findings: Rash present. Comments: Scattered itchy urticarial type rash in the axilla and waistline; good neurovascular sensation; no petechiae; no bruising or extending erythema; nontender   Neurological:      Mental Status: She is alert and oriented to person, place, and time. Psychiatric:         Mood and Affect: Mood normal.         Behavior: Behavior normal.          MDM       Procedures    Dr. Dottie Leyva examined the patient and discussed plan of care. Patient has been reexamined after the Decadron, Zyrtec and Pepcid with some relief of symptoms noted.   Recommend close follow-up if symptoms persist.      Patient's results and plan of care have been reviewed with her and her . Patient and/or family have verbally conveyed their understanding and agreement of the patient's signs, symptoms, diagnosis, treatment and prognosis and additionally agree to follow up as recommended or return to the Emergency Room should her condition change prior to follow-up. Discharge instructions have also been provided to the patient with some educational information regarding her diagnosis as well a list of reasons why she would want to return to the ER prior to her follow-up appointment should her condition change. Agatha Arellano, SHERYL

## 2021-04-28 NOTE — ED NOTES
Patient discharged home by Dyan Opitz NP. Prescriptions and discharge instructions. Patient verbalized understanding of discharge instructions.

## 2021-04-28 NOTE — ED TRIAGE NOTES
Pt stated her palms started itching and now she is itching everywhere,  +upper and lower lip edema noted, denies any difficulty breathing or swallowing

## 2021-10-06 ENCOUNTER — TELEPHONE (OUTPATIENT)
Dept: SLEEP MEDICINE | Age: 81
End: 2021-10-06

## 2021-10-13 ENCOUNTER — DOCUMENTATION ONLY (OUTPATIENT)
Dept: SLEEP MEDICINE | Age: 81
End: 2021-10-13

## 2021-10-13 ENCOUNTER — OFFICE VISIT (OUTPATIENT)
Dept: SLEEP MEDICINE | Age: 81
End: 2021-10-13
Payer: MEDICARE

## 2021-10-13 VITALS
WEIGHT: 163 LBS | SYSTOLIC BLOOD PRESSURE: 134 MMHG | HEART RATE: 97 BPM | DIASTOLIC BLOOD PRESSURE: 73 MMHG | BODY MASS INDEX: 32.86 KG/M2 | HEIGHT: 59 IN | OXYGEN SATURATION: 97 %

## 2021-10-13 DIAGNOSIS — I49.9 CARDIAC ARRHYTHMIA, UNSPECIFIED CARDIAC ARRHYTHMIA TYPE: ICD-10-CM

## 2021-10-13 DIAGNOSIS — G47.33 OBSTRUCTIVE SLEEP APNEA (ADULT) (PEDIATRIC): Primary | ICD-10-CM

## 2021-10-13 DIAGNOSIS — E11.9 CONTROLLED TYPE 2 DIABETES MELLITUS WITHOUT COMPLICATION, WITHOUT LONG-TERM CURRENT USE OF INSULIN (HCC): ICD-10-CM

## 2021-10-13 PROCEDURE — G8432 DEP SCR NOT DOC, RNG: HCPCS | Performed by: NURSE PRACTITIONER

## 2021-10-13 PROCEDURE — 1101F PT FALLS ASSESS-DOCD LE1/YR: CPT | Performed by: NURSE PRACTITIONER

## 2021-10-13 PROCEDURE — G8536 NO DOC ELDER MAL SCRN: HCPCS | Performed by: NURSE PRACTITIONER

## 2021-10-13 PROCEDURE — G8417 CALC BMI ABV UP PARAM F/U: HCPCS | Performed by: NURSE PRACTITIONER

## 2021-10-13 PROCEDURE — 3051F HG A1C>EQUAL 7.0%<8.0%: CPT | Performed by: NURSE PRACTITIONER

## 2021-10-13 PROCEDURE — G8399 PT W/DXA RESULTS DOCUMENT: HCPCS | Performed by: NURSE PRACTITIONER

## 2021-10-13 PROCEDURE — 99213 OFFICE O/P EST LOW 20 MIN: CPT | Performed by: NURSE PRACTITIONER

## 2021-10-13 PROCEDURE — 1090F PRES/ABSN URINE INCON ASSESS: CPT | Performed by: NURSE PRACTITIONER

## 2021-10-13 PROCEDURE — G8427 DOCREV CUR MEDS BY ELIG CLIN: HCPCS | Performed by: NURSE PRACTITIONER

## 2021-10-13 NOTE — PROGRESS NOTES
[address] [facility] [place]. [place], [address]   Tel.  [phone]   Fax. 100 [facility] 60   [place], [address] [facility]   Tel.  [phone]   Fax. [phone] [address]   Tel.  [phone]   Fax. [phone]     Noemy Singh (: 1940) is a 78 y.o. female, established patient, seen for positive airway pressure follow-up and evaluation. She was last seen by me on 10/7/2020, previously seen by Dr. Miranda Upton on 10/2/2019, prior notes reviewed in detail. Home sleep test 2015 showed AHI of 15.9/hr with a lowest SaO2 of 77%, duration of SaO2 < 88% 33 min. ASSESSMENT/PLAN:    ICD-10-CM ICD-9-CM    1. Obstructive sleep apnea (adult) (pediatric)  G47.33 327.23 AMB SUPPLY ORDER   2. Controlled type 2 diabetes mellitus without complication, without long-term current use of insulin ([facility])  E11.9 250.00    3. Cardiac arrhythmia, unspecified cardiac arrhythmia type  I49.9 427.9        AHI = 15.9(2015). On APAP, Respironics RemStar Pro (System One series 60) :  5-10 cmH2O. Set up 2015. She is adherent with PAP therapy and PAP continues to benefit patient and remains necessary for control of her sleep apnea. Her device is affected by the Wendy recall, review of  Care  shows no new device set up at this time. Follow-up and Dispositions    · Return in about 3 months (around 2022) for first adherence on new device. Sleep Apnea - New APAP device needed, current device has exceeded its life expectancy, is outdated and new technology is required. We have discussed the Wendy Respironics device recall, the need to register the device with Wendy, and I have advised positional therapy if PAP therapy is stopped.     Orders Placed This Encounter    AMB SUPPLY ORDER     Diagnosis: (G47.33) FELIX (obstructive sleep apnea)  (primary encounter diagnosis)      New APAP device needed, current device has exceeded its life expectancy, is outdated and new technology is required. Yan Chaney Positive Airway Pressure Therapy: Duration of need: 99 months.  APAP Device: Minimum Pressure: 6 cmH2O, Maximum Pressure: 12 cmH2O. Ramp Time: 20 Minutes.     Heated Humidifier  Loc Modem Access  Length of Need: 99 months     Nasal Pillows (Replace) 2 per month.  Nasal Interface Mask 1 every 3 months.  Pos Airway pressure chin strap   Headgear 1 every 6 months.  Tubing with heating element 1 every 3 months.  Filter(s) Disposable 2 per month.  Filter(s) Non-Disposable 1 every 6 months. .   [facility] for Humidifier (Replace) 1 every 6 months. BRENDA JacoboBC; NPI: [id number]    Electronically signed. Date:- 10/13/21       * Counseling was provided regarding the importance of regular PAP use with emphasis on ensuring sufficient total sleep time, proper sleep hygiene, and safe driving. * Re-enforced proper and regular cleaning for the device. We discussed the risk associated with use of cleaning devices and she will continue with use of dish soap and water as the appropriate cleaning method. * She was asked to contact our office for any problems regarding PAP therapy. 2.  Arrhythmia - continue on her current regimen. I have reviewed the relationship between heart arrhythmias and sleep disordered breathing. 3. Type II diabetes -  she continues on her current regimen. I have reviewed the relationship between sleep disordered breathing as it relates to diabetes. 4. Encouraged continued weight management program through appropriate diet and exercise regimen as significant weight reduction has been shown to reduce severity of obstructive sleep apnea. SUBJECTIVE/OBJECTIVE:    She  is seen today for follow up on PAP device and reports no problems using the device.    The following concerns identified:    Drowsiness no Problems exhaling no   Snoring no Forget to put on no   Mask Comfortable yes Can't fall asleep no   Dry Mouth no Mask falls off no   Air Leaking no Frequent awakenings no       She admits that her sleep has improved on PAP therapy using nasal pillows mask and heated tubing. Her device is affected by the recall     Review of device download indicated:  Auto pressure: 5-10 cmH2O; Peak Avg Pressure: 9.3 cmH2O;  Avg. Device Pressure <= 90 %: 10 cmH2O    Average % Night in Large Leak:  0.5  % Used Days >= 4 hours: 100. Avg hours used:  7:44. Therapy Apnea Index averaged over PAP use: 3.4 /hr which reflects improved sleep breathing condition. [place] Sleepiness Score: 10 and Modified F.O.S.Q. Score Total / 2: 16.5 which reflects improved sleep quality over therapy time. Sleep Review of Systems: notable for Negative difficulty falling asleep; Positive awakenings at night; Negative early morning headaches; Negative memory problems; Negative concentration issues; Negative chest pain; Negative shortness of breath; Negative significant joint pain at night; Negative significant muscle pain at night; Negative rashes or itching; Negative heartburn; Negative significant mood issues; 0 afternoon naps per week      Visit Vitals  /73 (BP 1 Location: Left upper arm, BP Patient Position: Sitting)   Pulse 97   Ht 4' 11\" (1.499 m)   Wt 163 lb (73.9 kg)   SpO2 97%   BMI 32.92 kg/m²          General:   Alert, oriented, not in acute distress   Eyes:  Anicteric Sclerae; no obvious strabismus   Nose:  No obvious nasal septum deviation    Neck:   Midline trachea   Chest/Lungs:  Symmetrical lung expansion, clear lung fields on auscultation    CVS:  Normal rate, regular rhythm,  no JVD   Extremities:  No obvious rashes, no edema    Neuro:  No focal deficits; No obvious tremor    Psych:  Normal affect,  normal countenance         An electronic signature was used to authenticate this note.     -- Jerson Armstrong NP, [facility]  10/13/21

## 2021-10-13 NOTE — PATIENT INSTRUCTIONS
217 [facility]., [place]. [place], [address]  Tel.  [phone]  Fax. 100 [facility] 60  [place], [address] [facility]  Tel.  [phone]  Fax. [phone] [address]  Tel.  [phone]  Fax. [phone]     Learning About CPAP for Sleep Apnea  What is CPAP? CPAP is a small machine that you use at home every night while you sleep. It increases air pressure in your throat to keep your airway open. When you have sleep apnea, this can help you sleep better so you feel much better. CPAP stands for \"continuous positive airway pressure. \"  The CPAP machine will have one of the following:  · A mask that covers your nose and mouth  · Prongs that fit into your nose  · A mask that covers your nose only, the most common type. This type is called NCPAP. The N stands for \"nasal.\"  Why is it done? CPAP is usually the best treatment for obstructive sleep apnea. It is the first treatment choice and the most widely used. Your doctor may suggest CPAP if you have:  · Moderate to severe sleep apnea. · Sleep apnea and coronary artery disease (CAD) or heart failure. How does it help? · CPAP can help you have more normal sleep, so you feel less sleepy and more alert during the daytime. · CPAP may help keep heart failure or other heart problems from getting worse. · NCPAP may help lower your blood pressure. · If you use CPAP, your bed partner may also sleep better because you are not snoring or restless. What are the side effects? Some people who use CPAP have:  · A dry or stuffy nose and a sore throat. · Irritated skin on the face. · Sore eyes. · Bloating. If you have any of these problems, work with your doctor to fix them. Here are some things you can try:  · Be sure the mask or nasal prongs fit well. · See if your doctor can adjust the pressure of your CPAP. · If your nose is dry, try a humidifier.   · If your nose is runny or stuffy, try decongestant medicine or a steroid nasal spray. If these things do not help, you might try a different type of machine. Some machines have air pressure that adjusts on its own. Others have air pressures that are different when you breathe in than when you breathe out. This may reduce discomfort caused by too much pressure in your nose. Where can you learn more? Go to AutoRef.com.be  Enter Brejake Kraft in the search box to learn more about \"Learning About CPAP for Sleep Apnea. \"   © 9473-0459 Healthwise, Incorporated. Care instructions adapted under license by [address] [place] [address] [address] (which disclaims liability or warranty for this information). This care instruction is for use with your licensed healthcare professional. If you have questions about a medical condition or this instruction, always ask your healthcare professional. [address] any warranty or liability for your use of this information. Content Version: 0.6.59814; Last Revised: January 11, 2010  PROPER SLEEP HYGIENE    What to avoid  · Do not have drinks with caffeine, such as coffee or black tea, for 8 hours before bed. · Do not smoke or use other types of tobacco near bedtime. Nicotine is a stimulant and can keep you awake. · Avoid drinking alcohol late in the evening, because it can cause you to wake in the middle of the night. · Do not eat a big meal close to bedtime. If you are hungry, eat a light snack. · Do not drink a lot of water close to bedtime, because the need to urinate may wake you up during the night. · Do not read or watch TV in bed. Use the bed only for sleeping and sexual activity. What to try  · Go to bed at the same time every night, and wake up at the same time every morning. Do not take naps during the day. · Keep your bedroom quiet, dark, and cool. · Get regular exercise, but not within 3 to 4 hours of your bedtime. .  · Sleep on a comfortable pillow and mattress.   · If watching the clock makes you anxious, turn it facing away from you so you cannot see the time. · If you worry when you lie down, start a worry book. Well before bedtime, write down your worries, and then set the book and your concerns aside. · Try meditation or other relaxation techniques before you go to bed. · If you cannot fall asleep, get up and go to another room until you feel sleepy. Do something relaxing. Repeat your bedtime routine before you go to bed again. · Make your house quiet and calm about an hour before bedtime. Turn down the lights, turn off the TV, log off the computer, and turn down the volume on music. This can help you relax after a busy day. Drowsy Driving: The [employer] cites drowsiness as a causing factor in more than 545,392 police reported crashes annually, resulting in 76,000 injuries and 1,500 deaths. Other surveys suggest 55% of people polled have driven while drowsy in the past year, 23% had fallen asleep but not crashed, 3% crashed, and 2% had and accident due to drowsy driving. Who is at risk? Young Drivers: One study of drowsy driving accidents states that 55% of the drivers were under 25 years. Of those, 75% were male. Shift Workers and Travelers: People who work overnight or travel across time zones frequently are at higher risk of experiencing Circadian Rhythm Disorders. They are trying to work and function when their body is programed to sleep. Sleep Deprived: Lack of sleep has a serious impact on your ability to pay attention or focus on a task. Consistently getting less than the average of 8 hours your body needs creates partial or cumulative sleep deprivation. Untreated Sleep Disorders: Sleep Apnea, Narcolepsy, R.L.S., and other sleep disorders (untreated) prevent a person from getting enough restful sleep. This leads to excessive daytime sleepiness and increases the risk for drowsy driving accidents by up to 7 times.   Medications / Alcohol: Even over the counter medications can cause drowsiness. Medications that impair a drivers attention should have a warning label. Alcohol naturally makes you sleepy and on its own can cause accidents. Combined with excessive drowsiness its effects are amplified. Signs of Drowsy Driving:   * You don't remember driving the last few miles   * You may drift out of your lucia   * You are unable to focus and your thoughts wander   * You may yawn more often than normal   * You have difficulty keeping your eyes open / nodding off   * Missing traffic signs, speeding, or tailgating  Prevention-   Good sleep hygiene, lifestyle and behavioral choices have the most impact on drowsy driving. There is no substitute for sleep and the average person requires 8 hours nightly. If you find yourself driving drowsy, stop and sleep. Consider the sleep hygiene tips provided during your visit as well. Medication Refill Policy: Refills for all medications require 1 week advance notice. Please have your pharmacy fax a refill request. We are unable to fax, or call in \"controled substance\" medications and you will need to pick these prescriptions up from our office. [employer] Activation    Thank you for requesting access to [employer]. Please follow the instructions below to securely access and download your online medical record. [employer] allows you to send messages to your doctor, view your test results, renew your prescriptions, schedule appointments, and more. How Do I Sign Up? 1. In your internet browser, go to [email or web address]. 2. Click on the First Time User? Click Here link in the Sign In box. You will see the New Member Sign Up page. 3. Enter your [employer] Access Code exactly as it appears below. You will not need to use this code after youve completed the sign-up process. If you do not sign up before the expiration date, you must request a new code. [employer] Access Code:  Activation code not generated  Current [employer] Status: Active (This is the date your [employer] Mobile access code will )    4. Enter the last four digits of your Social Security Number (xxxx) and Date of Birth (mm/dd/yyyy) as indicated and click Submit. You will be taken to the next sign-up page. 5. Create a Vertrot ID. This will be your [employer] login ID and cannot be changed, so think of one that is secure and easy to remember. 6. Create a [employer] password. You can change your password at any time. 7. Enter your Password Reset Question and Answer. This can be used at a later time if you forget your password. 8. Enter your e-mail address. You will receive e-mail notification when new information is available in [address] 9. Click Sign Up. You can now view and download portions of your medical record. 10. Click the Download Summary menu link to download a portable copy of your medical information. Additional Information    If you have questions, please call [phone]. Remember, [employer] is NOT to be used for urgent needs. For medical emergencies, dial 911.

## 2022-01-24 ENCOUNTER — TELEPHONE (OUTPATIENT)
Dept: SLEEP MEDICINE | Age: 82
End: 2022-01-24

## 2022-01-24 NOTE — TELEPHONE ENCOUNTER
Spoke with Geronimo Sánchez. Donya vogel stated that multiple attempts have been to contact patient to set up new device. Please cancel appt. Order will need to be refaxed again.

## 2022-04-07 ENCOUNTER — DOCUMENTATION ONLY (OUTPATIENT)
Dept: SLEEP MEDICINE | Age: 82
End: 2022-04-07

## 2022-07-01 ENCOUNTER — TELEPHONE (OUTPATIENT)
Dept: SLEEP MEDICINE | Age: 82
End: 2022-07-01

## 2022-07-08 ENCOUNTER — TELEPHONE (OUTPATIENT)
Dept: SLEEP MEDICINE | Age: 82
End: 2022-07-08

## 2022-08-03 ENCOUNTER — DOCUMENTATION ONLY (OUTPATIENT)
Dept: SLEEP MEDICINE | Age: 82
End: 2022-08-03

## 2022-08-03 ENCOUNTER — OFFICE VISIT (OUTPATIENT)
Dept: SLEEP MEDICINE | Age: 82
End: 2022-08-03
Payer: MEDICARE

## 2022-08-03 VITALS
SYSTOLIC BLOOD PRESSURE: 136 MMHG | BODY MASS INDEX: 32.66 KG/M2 | HEART RATE: 96 BPM | DIASTOLIC BLOOD PRESSURE: 82 MMHG | HEIGHT: 59 IN | WEIGHT: 162 LBS | TEMPERATURE: 98.5 F | OXYGEN SATURATION: 96 %

## 2022-08-03 DIAGNOSIS — I49.1 ATRIAL PREMATURE DEPOLARIZATION: ICD-10-CM

## 2022-08-03 DIAGNOSIS — G47.33 OBSTRUCTIVE SLEEP APNEA (ADULT) (PEDIATRIC): Primary | ICD-10-CM

## 2022-08-03 DIAGNOSIS — E11.9 CONTROLLED TYPE 2 DIABETES MELLITUS WITHOUT COMPLICATION, WITHOUT LONG-TERM CURRENT USE OF INSULIN (HCC): ICD-10-CM

## 2022-08-03 PROCEDURE — G8417 CALC BMI ABV UP PARAM F/U: HCPCS | Performed by: NURSE PRACTITIONER

## 2022-08-03 PROCEDURE — 1090F PRES/ABSN URINE INCON ASSESS: CPT | Performed by: NURSE PRACTITIONER

## 2022-08-03 PROCEDURE — G8536 NO DOC ELDER MAL SCRN: HCPCS | Performed by: NURSE PRACTITIONER

## 2022-08-03 PROCEDURE — G8427 DOCREV CUR MEDS BY ELIG CLIN: HCPCS | Performed by: NURSE PRACTITIONER

## 2022-08-03 PROCEDURE — 1123F ACP DISCUSS/DSCN MKR DOCD: CPT | Performed by: NURSE PRACTITIONER

## 2022-08-03 PROCEDURE — 99213 OFFICE O/P EST LOW 20 MIN: CPT | Performed by: NURSE PRACTITIONER

## 2022-08-03 PROCEDURE — G8399 PT W/DXA RESULTS DOCUMENT: HCPCS | Performed by: NURSE PRACTITIONER

## 2022-08-03 PROCEDURE — 1101F PT FALLS ASSESS-DOCD LE1/YR: CPT | Performed by: NURSE PRACTITIONER

## 2022-08-03 PROCEDURE — G8432 DEP SCR NOT DOC, RNG: HCPCS | Performed by: NURSE PRACTITIONER

## 2022-08-03 NOTE — PATIENT INSTRUCTIONS
[address] [facility] [place]. [place], [address]  Tel.  [phone]  Fax. 100 [facility] 60  [place], [address] [facility]  Tel.  [phone]  Fax. [phone] [address]  Tel.  [phone]  Fax. [phone]     Learning About CPAP for Sleep Apnea  What is CPAP? CPAP is a small machine that you use at home every night while you sleep. It increases air pressure in your throat to keep your airway open. When you have sleep apnea, this can help you sleep better so you feel much better. CPAP stands for \"continuous positive airway pressure. \"  The CPAP machine will have one of the following:  A mask that covers your nose and mouth  Prongs that fit into your nose  A mask that covers your nose only, the most common type. This type is called NCPAP. The N stands for \"nasal.\"  Why is it done? CPAP is usually the best treatment for obstructive sleep apnea. It is the first treatment choice and the most widely used. Your doctor may suggest CPAP if you have: Moderate to severe sleep apnea. Sleep apnea and coronary artery disease (CAD) or heart failure. How does it help? CPAP can help you have more normal sleep, so you feel less sleepy and more alert during the daytime. CPAP may help keep heart failure or other heart problems from getting worse. NCPAP may help lower your blood pressure. If you use CPAP, your bed partner may also sleep better because you are not snoring or restless. What are the side effects? Some people who use CPAP have:  A dry or stuffy nose and a sore throat. Irritated skin on the face. Sore eyes. Bloating. If you have any of these problems, work with your doctor to fix them. Here are some things you can try:  Be sure the mask or nasal prongs fit well. See if your doctor can adjust the pressure of your CPAP. If your nose is dry, try a humidifier.   If your nose is runny or stuffy, try decongestant medicine or a steroid nasal spray. If these things do not help, you might try a different type of machine. Some machines have air pressure that adjusts on its own. Others have air pressures that are different when you breathe in than when you breathe out. This may reduce discomfort caused by too much pressure in your nose. Where can you learn more? Go to [employer].be  Enter [employer] in the search box to learn more about \"Learning About CPAP for Sleep Apnea. \"   © 8834-0243 Healthwise, Incorporated. Care instructions adapted under license by [facility] (which disclaims liability or warranty for this information). This care instruction is for use with your licensed healthcare professional. If you have questions about a medical condition or this instruction, always ask your healthcare professional. [address] any warranty or liability for your use of this information. Content Version: 7.4.14547; Last Revised: January 11, 2010  PROPER SLEEP HYGIENE    What to avoid  Do not have drinks with caffeine, such as coffee or black tea, for 8 hours before bed. Do not smoke or use other types of tobacco near bedtime. Nicotine is a stimulant and can keep you awake. Avoid drinking alcohol late in the evening, because it can cause you to wake in the middle of the night. Do not eat a big meal close to bedtime. If you are hungry, eat a light snack. Do not drink a lot of water close to bedtime, because the need to urinate may wake you up during the night. Do not read or watch TV in bed. Use the bed only for sleeping and sexual activity. What to try  Go to bed at the same time every night, and wake up at the same time every morning. Do not take naps during the day. Keep your bedroom quiet, dark, and cool. Get regular exercise, but not within 3 to 4 hours of your bedtime. .  Sleep on a comfortable pillow and mattress.   If watching the clock makes you anxious, turn it facing away from you so you cannot see the time. If you worry when you lie down, start a worry book. Well before bedtime, write down your worries, and then set the book and your concerns aside. Try meditation or other relaxation techniques before you go to bed. If you cannot fall asleep, get up and go to another room until you feel sleepy. Do something relaxing. Repeat your bedtime routine before you go to bed again. Make your house quiet and calm about an hour before bedtime. Turn down the lights, turn off the TV, log off the computer, and turn down the volume on music. This can help you relax after a busy day. Drowsy Driving: The [place] [address] cites drowsiness as a causing factor in more than 161,676 police reported crashes annually, resulting in 76,000 injuries and 1,500 deaths. Other surveys suggest 55% of people polled have driven while drowsy in the past year, 23% had fallen asleep but not crashed, 3% crashed, and 2% had and accident due to drowsy driving. Who is at risk? Young Drivers: One study of drowsy driving accidents states that 55% of the drivers were under 25 years. Of those, 75% were male. Shift Workers and Travelers: People who work overnight or travel across time zones frequently are at higher risk of experiencing Circadian Rhythm Disorders. They are trying to work and function when their body is programed to sleep. Sleep Deprived: Lack of sleep has a serious impact on your ability to pay attention or focus on a task. Consistently getting less than the average of 8 hours your body needs creates partial or cumulative sleep deprivation. Untreated Sleep Disorders: Sleep Apnea, Narcolepsy, R.L.S., and other sleep disorders (untreated) prevent a person from getting enough restful sleep. This leads to excessive daytime sleepiness and increases the risk for drowsy driving accidents by up to 7 times.   Medications / Alcohol: Even over the counter medications can cause drowsiness. Medications that impair a drivers attention should have a warning label. Alcohol naturally makes you sleepy and on its own can cause accidents. Combined with excessive drowsiness its effects are amplified. Signs of Drowsy Driving:   * You don't remember driving the last few miles   * You may drift out of your lucia   * You are unable to focus and your thoughts wander   * You may yawn more often than normal   * You have difficulty keeping your eyes open / nodding off   * Missing traffic signs, speeding, or tailgating  Prevention-   Good sleep hygiene, lifestyle and behavioral choices have the most impact on drowsy driving. There is no substitute for sleep and the average person requires 8 hours nightly. If you find yourself driving drowsy, stop and sleep. Consider the sleep hygiene tips provided during your visit as well. Medication Refill Policy: Refills for all medications require 1 week advance notice. Please have your pharmacy fax a refill request. We are unable to fax, or call in \"controled substance\" medications and you will need to pick these prescriptions up from our office. ZIPDIGS Activation    Thank you for requesting access to ZIPDIGS. Please follow the instructions below to securely access and download your online medical record. ZIPDIGS allows you to send messages to your doctor, view your test results, renew your prescriptions, schedule appointments, and more. How Do I Sign Up? In your internet browser, go to [email or web address]. Click on the First Time User? Click Here link in the Sign In box. You will see the New Member Sign Up page. Enter your ZIPDIGS Access Code exactly as it appears below. You will not need to use this code after youve completed the sign-up process. If you do not sign up before the expiration date, you must request a new code. ZIPDIGS Access Code:  Activation code not generated  Current ZIPDIGS Status: Active (This is the date your TradeHarbor access code will )    Enter the last four digits of your Social Security Number (xxxx) and Date of Birth (mm/dd/yyyy) as indicated and click Submit. You will be taken to the next sign-up page. Create a TradeHarbor ID. This will be your TradeHarbor login ID and cannot be changed, so think of one that is secure and easy to remember. Create a TradeHarbor password. You can change your password at any time. Enter your Password Reset Question and Answer. This can be used at a later time if you forget your password. Enter your e-mail address. You will receive e-mail notification when new information is available in [address] Click Sign Up. You can now view and download portions of your medical record. Click the [employer] link to download a portable copy of your medical information. Additional Information    If you have questions, please call [phone]. Remember, TradeHarbor is NOT to be used for urgent needs. For medical emergencies, dial 911.

## 2022-08-03 NOTE — PROGRESS NOTES
217 [facility]. [place], [address]   Tel.  [phone]   Fax. 100 [facility] 60   [place], [address] [facility]   Tel.  [phone]   Fax. [phone] [address] [place] [address]    Tel.  [phone]   Fax. [phone]     Maria Isabel Olivera (: 1940) is a 78 y.o. female, established patient, seen for positive airway pressure follow-up and evaluation. She was last seen by me on 10/13/2021, previously seen by Dr. Shayla Farah on 10/2/2019, prior notes reviewed in detail. Home sleep test 2015 showed AHI of 15.9/hr with a lowest SaO2 of 77%, duration of SaO2 < 88% 33 min. ASSESSMENT/PLAN:    ICD-10-CM ICD-9-CM    1. Obstructive sleep apnea (adult) (pediatric)  G47.33 327.23 AMB SUPPLY ORDER      2. Atrial premature depolarization  I49.1 427.61       3. Controlled type 2 diabetes mellitus without complication, without long-term current use of insulin ([facility])  E11.9 250.00       4. Adult BMI 32.0-32.9 kg/sq m  Z68.32 V85.32           AHI = 15.9(2015). On APAP, ResMed: 6-12 cmH2O, Set up 2022. Prior device Respironics RemStar Pro (System One series 60) :  5-10 cmH2O. Set up 2015. She is adherent  on new Resmed device and PAP continues to benefit patient and remains necessary for control of her sleep apnea. Follow-up and Dispositions    Return in about 1 year (around 8/3/2023) for annual follow up . Sleep Apnea -    Continue on current pressures    * Supplies ordered - nasal pillows mask and heated tubing    Orders Placed This Encounter    AMB SUPPLY ORDER     Diagnosis: (G47.33) FELIX (obstructive sleep apnea)  (primary encounter diagnosis)     Replacement Supplies for Positive Airway Pressure Therapy Device:   Duration of need: 99 months.  Nasal Pillows (Replace) 2 per month.  Nasal Interface Mask 1 every 3 months.  Pos Airway pressure chin strap   Headgear 1 every 6 months.      Tubing with heating element 1 every 3 months.  Filter(s) Disposable 2 per month.  Filter(s) Non-Disposable 1 every 6 months. .   [address] [facility] for Humidifier (Replace) 1 every 6 months. JIA Spaulding-BC; NPI: [id number]    Electronically signed. Date:- 08/03/22       * Counseling was provided regarding the importance of regular PAP use with emphasis on ensuring sufficient total sleep time, proper sleep hygiene, and safe driving. * Re-enforced proper and regular cleaning for the device. * She was asked to contact our office for any problems regarding PAP therapy. 2.  Atrial premature depolarization - continue on her current regimen. I have reviewed the relationship between cardiac health and sleep disordered breathing. 3. Type II diabetes -  she continues on her current regimen. I have reviewed the relationship between sleep disordered breathing as it relates to diabetes. 4. Encouraged continued weight management program through appropriate diet and exercise regimen as significant weight reduction has been shown to reduce severity of obstructive sleep apnea. SUBJECTIVE/OBJECTIVE:    She  is seen today for follow up on PAP device and reports no problems using the device. The following concerns identified:    Drowsiness no Problems exhaling no   Snoring no Forget to put on no   Mask Comfortable yes Can't fall asleep no   Dry Mouth no Mask falls off no   Air Leaking no Frequent awakenings no       She admits that her sleep has improved on PAP therapy using nasal pillows mask and heated tubing. Review of device download indicated:  Auto pressure: 6-12 cmH2O; Max Pressure: 11.2 cmH2O;  95th percentile Pressure: 10.2 cmH2O   95th Percentile Leak: 39.0 L/Min     % Used Days >= 4 hours: 100. Avg hours used:  6:56. Therapy Apnea Index averaged over PAP use: 0.9 /hr which reflects improved sleep breathing condition.     Sharon Sleepiness Score: 8 and Modified F.O.S.Q. Score Total / 2: 15.5 which reflects improved sleep quality over therapy time. Sleep Review of Systems: notable for Negative difficulty falling asleep; Negative awakenings at night; Negative early morning headaches; Negative memory problems; Negative concentration issues; Negative chest pain; Negative shortness of breath; Negative significant joint pain at night; Negative significant muscle pain at night; Negative rashes or itching; Negative heartburn; Negative significant mood issues; 0 afternoon naps per week      Visit Vitals  /82 (BP 1 Location: Left upper arm, BP Patient Position: Sitting, BP Cuff Size: Adult)   Pulse 96   Temp 98.5 °F (36.9 °C) (Temporal)   Ht 4' 11\" (1.499 m)   Wt 162 lb (73.5 kg)   SpO2 96%   BMI 32.72 kg/m²          General:   Alert, oriented, not in acute distress   Eyes:  Anicteric Sclerae; no obvious strabismus   Nose:  No obvious nasal septum deviation    Neck:   Midline trachea   Chest/Lungs:  Symmetrical lung expansion, clear lung fields on auscultation    CVS:  Normal rate, regular rhythm,  no JVD   Extremities:  No obvious rashes, no edema    Neuro:  No focal deficits; No obvious tremor    Psych:  Normal affect,  normal countenance     Patient's phone number [phone] (home)  was reviewed and confirmed for accuracy. She gives permission for messages regarding results and appointments to be left at that number. An electronic signature was used to authenticate this note.     -- Maggy Jalloh NP, [facility]  08/03/22

## 2023-02-24 ENCOUNTER — TELEPHONE (OUTPATIENT)
Dept: INTERNAL MEDICINE CLINIC | Age: 83
End: 2023-02-24

## 2023-03-24 ENCOUNTER — OFFICE VISIT (OUTPATIENT)
Dept: URGENT CARE | Age: 83
End: 2023-03-24

## 2023-03-24 VITALS
BODY MASS INDEX: 32.32 KG/M2 | WEIGHT: 160 LBS | HEART RATE: 100 BPM | SYSTOLIC BLOOD PRESSURE: 151 MMHG | OXYGEN SATURATION: 97 % | DIASTOLIC BLOOD PRESSURE: 72 MMHG | RESPIRATION RATE: 17 BRPM | TEMPERATURE: 96.8 F

## 2023-03-24 DIAGNOSIS — J34.89 RHINORRHEA: ICD-10-CM

## 2023-03-24 DIAGNOSIS — J06.9 VIRAL UPPER RESPIRATORY ILLNESS: Primary | ICD-10-CM

## 2023-03-24 DIAGNOSIS — J00 ACUTE RHINITIS: ICD-10-CM

## 2023-03-24 LAB
LOT NUMBER POC: NORMAL
S PYO AG THROAT QL: NEGATIVE
SARS-COV-2 PCR, POC: NEGATIVE
VALID INTERNAL CONTROL?: YES
VALID INTERNAL CONTROL?: YES

## 2023-03-24 RX ORDER — IPRATROPIUM BROMIDE 21 UG/1
2 SPRAY, METERED NASAL EVERY 12 HOURS
Qty: 30 ML | Refills: 0 | Status: SHIPPED | OUTPATIENT
Start: 2023-03-24

## 2023-03-24 NOTE — PROGRESS NOTES
Subjective: (As above and below)     The patient/guardian gave verbal consent to treat. Chief Complaint   Patient presents with    Nasal Congestion     Patient presents for cold sx that include, congestion drainage is clear to color, runny nose, sore throat, cough. Patient has been having the sx for the last 3 days with no relief     Pato White is a 78 y.o. female who presents for evaluation of : nasal congestion, runny nose, post nasal drip, scratchy throat, cough. Symptom onset 3 days ago. A little more tired than usual but still active. No labored breathing or SOB . Preceding illness: none. No other identified aggravating or alleviating factors. Symptoms are constant and overall unchanged. Denies: aches, vomiting/diarrhea, rashes, fevers . Known Exposure to COVID-19: no      ROS  Review of Systems - negative except as listed above    Reviewed PmHx, RxHx, FmHx, SocHx, AllgHx and updated in chart. Family History   Problem Relation Age of Onset    Heart Failure Father         CHF    Emphysema Father         (smoker)    COPD Father     OSTEOARTHRITIS Mother     Arrhythmia Mother     Heart Attack Mother     Breast Cancer Mother         66's    Heart Attack Maternal Grandmother     Thyroid Disease Daughter     Thyroid Disease Daughter     Anesth Problems Neg Hx        Past Medical History:   Diagnosis Date    Arrhythmia     PVC AND MVP    Arthritis     Diabetes ([address]     adult-onset    DVT, lower extremity (HCC)     IBS (irritable bowel syndrome)     Irregular heart beat     (PVCs)    MVP (mitral valve prolapse)     h/o (neg ECHO-3/06)    Sleep apnea     WEARS CPAP    Stroke ([address] 2010    TIA-NO LASTING EFFECTS    Thromboembolus (HCC)       Social History     Socioeconomic History    Marital status:    Tobacco Use    Smoking status: Never    Smokeless tobacco: Never   Vaping Use    Vaping Use: Never used   Substance and Sexual Activity    Alcohol use:  Yes     Alcohol/week: 0.0 standard drinks     Comment: occassionally    Drug use: No    Sexual activity: Not Currently     Partners: Male          Current Outpatient Medications   Medication Sig    ipratropium (ATROVENT) 21 mcg (0.03 %) nasal spray 2 Sprays by Both Nostrils route every twelve (12) hours. cetirizine (ZyrTEC) 10 mg tablet Take 1 Tab by mouth daily. take 1 tablet daily for the next 4 days then as needed for allergy symptoms    glipiZIDE (GLUCOTROL) 5 mg tablet Take  by mouth two (2) times a day. acetaminophen (TYLENOL) 325 mg tablet Take  by mouth every four (4) hours as needed for Pain. atorvastatin (LIPITOR) 10 mg tablet Take  by mouth daily. sertraline (ZOLOFT) 50 mg tablet Take 1 Tab by mouth daily. naproxen sodium 220 mg cap Take 1 Tab by mouth daily as needed. glucose blood VI test strips (ONETOUCH ULTRA BLUE TEST STRIP) strip Check blood sugar twice a day E11.9    metFORMIN ER (GLUCOPHAGE XR) 750 mg tablet Take 1 Tab by mouth two (2) times daily (with meals). (Patient taking differently: Take 750 mg by mouth two (2) times daily (with meals). Indications: PT TAKES 500MG IN AM AND 1000MG IN EVENING)    nitroglycerin (NITROSTAT) 0.4 mg SL tablet 0.4 mg by SubLINGual route every five (5) minutes as needed for Chest Pain. Up to 3 doses. amLODIPine (NORVASC) 5 mg tablet 5 mg nightly. Blood-Glucose Meter monitoring kit Check BG 2 times/day; onetouch meter; E11.9    cyanocobalamin (VITAMIN B12) 500 mcg tablet Take 500 mcg by mouth two (2) times a day. biotin 2,500 mcg tab Take 10,000 mcg by mouth. cholecalciferol (VITAMIN D3) 25 mcg (1,000 unit) cap Take 1,000 Units by mouth. MULTIVITAMIN PO Take 1 Tab by mouth daily. aspirin delayed-release 81 mg tablet Take 81 mg by mouth daily. Every other day     No current facility-administered medications for this visit.        Objective:     Vitals:    03/24/23 1638 03/24/23 1643   BP: (!) 166/75 (!) 151/72   Pulse: 100    Resp: 17    Temp: 96.8 °F (36 °C) SpO2: 97%    Weight: 160 lb (72.6 kg)        Physical Exam  General appearance - appears well hydrated and does not appear toxic, no acute distress  Eyes - EOMs intact. Non injected. No scleral icterus   Ears - no external swelling. TMs normal bilat. Nose - nasal congestion, clear rhinorrhea and sniffling. No purulent drainage  Mouth - OP clear without swelling, exudate or lesion. Mucus membranes moist. Uvula midline. Neck/Lymphatics - trachea midline, full AROM, no LAD of neck  Chest - Normal breathing effort no wheeze rales, rhonchi or diminishments bilaterally. Heart - RRR, no murmurs  Skin - no observable rashes or pallor  Neurologic- alert and oriented x 3  Psychiatric- normal mood, behavior and though content. Assessment/ Plan:     1. Viral upper respiratory illness    - POCT COVID-19, SARS-COV-2, PCR  - AMB POC RAPID STREP A    2. Acute rhinitis    - ipratropium (ATROVENT) 21 mcg (0.03 %) nasal spray; 2 Sprays by Both Nostrils route every twelve (12) hours. Dispense: 30 mL; Refill: 0    3. Rhinorrhea    - ipratropium (ATROVENT) 21 mcg (0.03 %) nasal spray; 2 Sprays by Both Nostrils route every twelve (12) hours. Dispense: 30 mL; Refill: 0      Covid 19 test result today negative  Rapid strep test is negative  Viral illness. No evidence suggesting complication of illness at this time. Will discharge home with close monitoring and follow up.   Supportive home care advised- maintain adequate fluid intake, over the counter Tylenol (for fever, aches, pains, chills), deep breathing exercises, nasal saline sprays for congestion, humidified air bedroom at night        Test Results:  Recent Results (from the past 6 hour(s))   POCT COVID-19, SARS-COV-2, PCR    Collection Time: 03/24/23  4:54 PM   Result Value Ref Range    SARS-COV-2 PCR, POC Negative Negative    VALID INTERNAL CONTROL POC Yes     LOT NUMBER [id number]    AMB POC RAPID STREP A    Collection Time: 03/24/23  4:58 PM   Result Value Ref Range VALID INTERNAL CONTROL POC Yes     Group A Strep Ag Negative Negative       Follow up: Follow up immediately for any new, worsening or changes or if symptoms are not improving over the next 5-7 days.          Socorro Matt, NP

## 2023-05-19 ENCOUNTER — CLINICAL DOCUMENTATION (OUTPATIENT)
Age: 83
End: 2023-05-19

## 2023-05-19 NOTE — PROGRESS NOTES
LVM to call back to inform 8/9/2023 appointment has been rescheduled to 7/13/2023 at  3.30 pm due to provider not available that day.

## 2023-05-25 ENCOUNTER — OFFICE VISIT (OUTPATIENT)
Age: 83
End: 2023-05-25
Payer: MEDICARE

## 2023-05-25 VITALS
RESPIRATION RATE: 16 BRPM | BODY MASS INDEX: 31.97 KG/M2 | OXYGEN SATURATION: 97 % | DIASTOLIC BLOOD PRESSURE: 70 MMHG | SYSTOLIC BLOOD PRESSURE: 132 MMHG | WEIGHT: 158.6 LBS | TEMPERATURE: 98.9 F | HEIGHT: 59 IN | HEART RATE: 81 BPM

## 2023-05-25 DIAGNOSIS — Z99.89 OSA ON CPAP: ICD-10-CM

## 2023-05-25 DIAGNOSIS — G47.33 OSA ON CPAP: ICD-10-CM

## 2023-05-25 DIAGNOSIS — I49.1 PAC (PREMATURE ATRIAL CONTRACTION): ICD-10-CM

## 2023-05-25 DIAGNOSIS — F41.9 ANXIETY: ICD-10-CM

## 2023-05-25 DIAGNOSIS — Z00.00 MEDICARE ANNUAL WELLNESS VISIT, SUBSEQUENT: Primary | ICD-10-CM

## 2023-05-25 DIAGNOSIS — E11.9 TYPE 2 DIABETES MELLITUS WITHOUT COMPLICATION, WITHOUT LONG-TERM CURRENT USE OF INSULIN (HCC): ICD-10-CM

## 2023-05-25 DIAGNOSIS — E78.00 HYPERCHOLESTEROLEMIA: ICD-10-CM

## 2023-05-25 PROCEDURE — G0439 PPPS, SUBSEQ VISIT: HCPCS | Performed by: INTERNAL MEDICINE

## 2023-05-25 PROCEDURE — 1036F TOBACCO NON-USER: CPT | Performed by: INTERNAL MEDICINE

## 2023-05-25 PROCEDURE — G8427 DOCREV CUR MEDS BY ELIG CLIN: HCPCS | Performed by: INTERNAL MEDICINE

## 2023-05-25 PROCEDURE — 1123F ACP DISCUSS/DSCN MKR DOCD: CPT | Performed by: INTERNAL MEDICINE

## 2023-05-25 PROCEDURE — 99214 OFFICE O/P EST MOD 30 MIN: CPT | Performed by: INTERNAL MEDICINE

## 2023-05-25 PROCEDURE — G8417 CALC BMI ABV UP PARAM F/U: HCPCS | Performed by: INTERNAL MEDICINE

## 2023-05-25 PROCEDURE — G8399 PT W/DXA RESULTS DOCUMENT: HCPCS | Performed by: INTERNAL MEDICINE

## 2023-05-25 PROCEDURE — 1090F PRES/ABSN URINE INCON ASSESS: CPT | Performed by: INTERNAL MEDICINE

## 2023-05-25 RX ORDER — AMOXICILLIN 500 MG/1
CAPSULE ORAL
COMMUNITY
Start: 2022-01-18 | End: 2023-05-25

## 2023-05-25 RX ORDER — BLOOD SUGAR DIAGNOSTIC
STRIP MISCELLANEOUS
COMMUNITY
Start: 2023-05-16

## 2023-05-25 RX ORDER — SITAGLIPTIN AND METFORMIN HYDROCHLORIDE 1000; 50 MG/1; MG/1
1 TABLET, FILM COATED, EXTENDED RELEASE ORAL 2 TIMES DAILY
COMMUNITY
Start: 2023-04-22

## 2023-05-25 RX ORDER — IPRATROPIUM BROMIDE 21 UG/1
SPRAY, METERED NASAL
COMMUNITY
Start: 2023-03-24

## 2023-05-25 RX ORDER — MOMETASONE FUROATE 1 MG/G
OINTMENT TOPICAL
COMMUNITY
Start: 2023-04-03 | End: 2023-05-25

## 2023-05-25 SDOH — ECONOMIC STABILITY: HOUSING INSECURITY
IN THE LAST 12 MONTHS, WAS THERE A TIME WHEN YOU DID NOT HAVE A STEADY PLACE TO SLEEP OR SLEPT IN A SHELTER (INCLUDING NOW)?: NO

## 2023-05-25 SDOH — ECONOMIC STABILITY: FOOD INSECURITY: WITHIN THE PAST 12 MONTHS, YOU WORRIED THAT YOUR FOOD WOULD RUN OUT BEFORE YOU GOT MONEY TO BUY MORE.: NEVER TRUE

## 2023-05-25 SDOH — ECONOMIC STABILITY: INCOME INSECURITY: HOW HARD IS IT FOR YOU TO PAY FOR THE VERY BASICS LIKE FOOD, HOUSING, MEDICAL CARE, AND HEATING?: NOT HARD AT ALL

## 2023-05-25 SDOH — ECONOMIC STABILITY: FOOD INSECURITY: WITHIN THE PAST 12 MONTHS, THE FOOD YOU BOUGHT JUST DIDN'T LAST AND YOU DIDN'T HAVE MONEY TO GET MORE.: NEVER TRUE

## 2023-05-25 ASSESSMENT — ENCOUNTER SYMPTOMS
DIARRHEA: 1
SHORTNESS OF BREATH: 0
ABDOMINAL PAIN: 0

## 2023-05-25 ASSESSMENT — PATIENT HEALTH QUESTIONNAIRE - PHQ9
SUM OF ALL RESPONSES TO PHQ QUESTIONS 1-9: 0
SUM OF ALL RESPONSES TO PHQ QUESTIONS 1-9: 0
SUM OF ALL RESPONSES TO PHQ9 QUESTIONS 1 & 2: 0
SUM OF ALL RESPONSES TO PHQ QUESTIONS 1-9: 0
1. LITTLE INTEREST OR PLEASURE IN DOING THINGS: 0
SUM OF ALL RESPONSES TO PHQ QUESTIONS 1-9: 0
2. FEELING DOWN, DEPRESSED OR HOPELESS: 0

## 2023-05-25 ASSESSMENT — LIFESTYLE VARIABLES
HOW OFTEN DO YOU HAVE A DRINK CONTAINING ALCOHOL: 2-3 TIMES A WEEK
HOW MANY STANDARD DRINKS CONTAINING ALCOHOL DO YOU HAVE ON A TYPICAL DAY: 1 OR 2

## 2023-05-25 NOTE — PROGRESS NOTES
Medicare Annual Wellness Visit    Asia Klein is here for Medicare AWV    Assessment & Plan   Medicare annual wellness visit, subsequent  Health maintenance reviewed and updated with patient today at visit. Type 2 diabetes mellitus without complication, without long-term current use of insulin ([address]  Assessment & Plan:   Monitored by specialist- no acute findings meriting change in the plan   Request most recent lab work. Hypercholesterolemia  Monitored by endocrine and continue with current medication and will review lab work when received from specialist.  PAC (premature atrial contraction)  Assessment & Plan:   Monitored by specialist- no acute findings meriting change in the plan  Anxiety  This is been well controlled and monitored by endocrine. ZEN on CPAP  Assessment & Plan:   Monitored by specialist- no acute findings meriting change in the plan    Recommendations for Preventive Services Due: see orders and patient instructions/AVS.  Recommended screening schedule for the next 5-10 years is provided to the patient in written form: see Patient Instructions/AVS.     Return in about 1 year (around 5/25/2024) for [address] Subjective   The following acute and/or chronic problems were also addressed today:  Diabetes and is [facility] and endocrinology. ,  Hypertension, anxiety, hypercholesterolemia. follow up active medical problems and medication management. Taking medication with no side effects. Exercise: none Diet diabetic diet follows sometimes. BP ranging: not checking  BS: 170-200  Sees Dr David Quintero in cardiology  Patient's complete Health Risk Assessment and screening values have been reviewed and are found in Flowsheets. The following problems were reviewed today and where indicated follow up appointments were made and/or referrals ordered.     Positive Risk Factor Screenings with Interventions:    Fall Risk:  Do you feel unsteady or are you worried about falling? : (!) yes  2 or

## 2023-05-25 NOTE — ASSESSMENT & PLAN NOTE
Monitored by specialist- no acute findings meriting change in the plan   Request most recent lab work.

## 2023-07-13 ENCOUNTER — CLINICAL DOCUMENTATION (OUTPATIENT)
Age: 83
End: 2023-07-13

## 2023-07-13 ENCOUNTER — OFFICE VISIT (OUTPATIENT)
Age: 83
End: 2023-07-13
Payer: MEDICARE

## 2023-07-13 VITALS
TEMPERATURE: 98.4 F | HEIGHT: 59 IN | BODY MASS INDEX: 31.65 KG/M2 | WEIGHT: 157 LBS | SYSTOLIC BLOOD PRESSURE: 137 MMHG | OXYGEN SATURATION: 97 % | DIASTOLIC BLOOD PRESSURE: 74 MMHG | HEART RATE: 96 BPM

## 2023-07-13 DIAGNOSIS — G47.33 OBSTRUCTIVE SLEEP APNEA (ADULT) (PEDIATRIC): Primary | ICD-10-CM

## 2023-07-13 PROCEDURE — 1036F TOBACCO NON-USER: CPT | Performed by: NURSE PRACTITIONER

## 2023-07-13 PROCEDURE — G8417 CALC BMI ABV UP PARAM F/U: HCPCS | Performed by: NURSE PRACTITIONER

## 2023-07-13 PROCEDURE — G8399 PT W/DXA RESULTS DOCUMENT: HCPCS | Performed by: NURSE PRACTITIONER

## 2023-07-13 PROCEDURE — 1123F ACP DISCUSS/DSCN MKR DOCD: CPT | Performed by: NURSE PRACTITIONER

## 2023-07-13 PROCEDURE — G8427 DOCREV CUR MEDS BY ELIG CLIN: HCPCS | Performed by: NURSE PRACTITIONER

## 2023-07-13 PROCEDURE — 1090F PRES/ABSN URINE INCON ASSESS: CPT | Performed by: NURSE PRACTITIONER

## 2023-07-13 PROCEDURE — 99213 OFFICE O/P EST LOW 20 MIN: CPT | Performed by: NURSE PRACTITIONER

## 2023-07-13 ASSESSMENT — SLEEP AND FATIGUE QUESTIONNAIRES
HOW LIKELY ARE YOU TO NOD OFF OR FALL ASLEEP WHILE SITTING INACTIVE IN A PUBLIC PLACE: 1
ESS TOTAL SCORE: 12
HOW LIKELY ARE YOU TO NOD OFF OR FALL ASLEEP WHEN YOU ARE A PASSENGER IN A CAR FOR AN HOUR WITHOUT A BREAK: 1
HOW LIKELY ARE YOU TO NOD OFF OR FALL ASLEEP WHILE SITTING QUIETLY AFTER LUNCH WITHOUT ALCOHOL: 2
HOW LIKELY ARE YOU TO NOD OFF OR FALL ASLEEP IN A CAR, WHILE STOPPED FOR A FEW MINUTES IN TRAFFIC: 0
HOW LIKELY ARE YOU TO NOD OFF OR FALL ASLEEP WHILE WATCHING TV: 2
HOW LIKELY ARE YOU TO NOD OFF OR FALL ASLEEP WHILE SITTING AND READING: 2
HOW LIKELY ARE YOU TO NOD OFF OR FALL ASLEEP WHILE LYING DOWN TO REST IN THE AFTERNOON WHEN CIRCUMSTANCES PERMIT: 3
HOW LIKELY ARE YOU TO NOD OFF OR FALL ASLEEP WHILE SITTING AND TALKING TO SOMEONE: 1

## 2023-07-13 NOTE — PATIENT INSTRUCTIONS
drowsiness. Medications that impair a drivers attention should have a warning label. Alcohol naturally makes you sleepy and on its own can cause accidents. Combined with excessive drowsiness its effects are amplified. Signs of Drowsy Driving:   * You don't remember driving the last few miles   * You may drift out of your jonas   * You are unable to focus and your thoughts wander   * You may yawn more often than normal   * You have difficulty keeping your eyes open / nodding off   * Missing traffic signs, speeding, or tailgating  Prevention-   Good sleep hygiene, lifestyle and behavioral choices have the most impact on drowsy driving. There is no substitute for sleep and the average person requires 8 hours nightly. If you find yourself driving drowsy, stop and sleep. Consider the sleep hygiene tips provided during your visit as well. Medication Refill Policy: Refills for all medications require 1 week advance notice. Please have your pharmacy fax a refill request. We are unable to fax, or call in \"controled substance\" medications and you will need to pick these prescriptions up from our office.

## 2024-04-22 LAB — HBA1C MFR BLD HPLC: 8.4 %

## 2024-05-28 ENCOUNTER — OFFICE VISIT (OUTPATIENT)
Age: 84
End: 2024-05-28
Payer: MEDICARE

## 2024-05-28 VITALS
HEIGHT: 59 IN | TEMPERATURE: 98.4 F | RESPIRATION RATE: 16 BRPM | DIASTOLIC BLOOD PRESSURE: 75 MMHG | HEART RATE: 94 BPM | OXYGEN SATURATION: 96 % | SYSTOLIC BLOOD PRESSURE: 123 MMHG | BODY MASS INDEX: 31.73 KG/M2 | WEIGHT: 157.4 LBS

## 2024-05-28 DIAGNOSIS — F43.21 GRIEF REACTION: ICD-10-CM

## 2024-05-28 DIAGNOSIS — I49.1 PAC (PREMATURE ATRIAL CONTRACTION): ICD-10-CM

## 2024-05-28 DIAGNOSIS — E11.9 TYPE 2 DIABETES MELLITUS WITHOUT COMPLICATION, WITHOUT LONG-TERM CURRENT USE OF INSULIN (HCC): ICD-10-CM

## 2024-05-28 DIAGNOSIS — R29.898 WEAKNESS OF BOTH LOWER EXTREMITIES: ICD-10-CM

## 2024-05-28 DIAGNOSIS — R26.89 BALANCE PROBLEM: ICD-10-CM

## 2024-05-28 DIAGNOSIS — Z00.00 MEDICARE ANNUAL WELLNESS VISIT, SUBSEQUENT: Primary | ICD-10-CM

## 2024-05-28 PROCEDURE — G8417 CALC BMI ABV UP PARAM F/U: HCPCS | Performed by: INTERNAL MEDICINE

## 2024-05-28 PROCEDURE — 99214 OFFICE O/P EST MOD 30 MIN: CPT | Performed by: INTERNAL MEDICINE

## 2024-05-28 PROCEDURE — 1123F ACP DISCUSS/DSCN MKR DOCD: CPT | Performed by: INTERNAL MEDICINE

## 2024-05-28 PROCEDURE — G8399 PT W/DXA RESULTS DOCUMENT: HCPCS | Performed by: INTERNAL MEDICINE

## 2024-05-28 PROCEDURE — 1036F TOBACCO NON-USER: CPT | Performed by: INTERNAL MEDICINE

## 2024-05-28 PROCEDURE — 1090F PRES/ABSN URINE INCON ASSESS: CPT | Performed by: INTERNAL MEDICINE

## 2024-05-28 PROCEDURE — G0439 PPPS, SUBSEQ VISIT: HCPCS | Performed by: INTERNAL MEDICINE

## 2024-05-28 PROCEDURE — G8427 DOCREV CUR MEDS BY ELIG CLIN: HCPCS | Performed by: INTERNAL MEDICINE

## 2024-05-28 RX ORDER — EMPAGLIFLOZIN, METFORMIN HYDROCHLORIDE 12.5; 1 MG/1; MG/1
TABLET, EXTENDED RELEASE ORAL
COMMUNITY

## 2024-05-28 SDOH — ECONOMIC STABILITY: FOOD INSECURITY: WITHIN THE PAST 12 MONTHS, THE FOOD YOU BOUGHT JUST DIDN'T LAST AND YOU DIDN'T HAVE MONEY TO GET MORE.: NEVER TRUE

## 2024-05-28 SDOH — ECONOMIC STABILITY: FOOD INSECURITY: WITHIN THE PAST 12 MONTHS, YOU WORRIED THAT YOUR FOOD WOULD RUN OUT BEFORE YOU GOT MONEY TO BUY MORE.: NEVER TRUE

## 2024-05-28 SDOH — ECONOMIC STABILITY: INCOME INSECURITY: HOW HARD IS IT FOR YOU TO PAY FOR THE VERY BASICS LIKE FOOD, HOUSING, MEDICAL CARE, AND HEATING?: NOT HARD AT ALL

## 2024-05-28 ASSESSMENT — PATIENT HEALTH QUESTIONNAIRE - PHQ9
4. FEELING TIRED OR HAVING LITTLE ENERGY: NEARLY EVERY DAY
SUM OF ALL RESPONSES TO PHQ QUESTIONS 1-9: 10
8. MOVING OR SPEAKING SO SLOWLY THAT OTHER PEOPLE COULD HAVE NOTICED. OR THE OPPOSITE, BEING SO FIGETY OR RESTLESS THAT YOU HAVE BEEN MOVING AROUND A LOT MORE THAN USUAL: NOT AT ALL
SUM OF ALL RESPONSES TO PHQ QUESTIONS 1-9: 10
SUM OF ALL RESPONSES TO PHQ QUESTIONS 1-9: 10
6. FEELING BAD ABOUT YOURSELF - OR THAT YOU ARE A FAILURE OR HAVE LET YOURSELF OR YOUR FAMILY DOWN: SEVERAL DAYS
2. FEELING DOWN, DEPRESSED OR HOPELESS: MORE THAN HALF THE DAYS
10. IF YOU CHECKED OFF ANY PROBLEMS, HOW DIFFICULT HAVE THESE PROBLEMS MADE IT FOR YOU TO DO YOUR WORK, TAKE CARE OF THINGS AT HOME, OR GET ALONG WITH OTHER PEOPLE: SOMEWHAT DIFFICULT
7. TROUBLE CONCENTRATING ON THINGS, SUCH AS READING THE NEWSPAPER OR WATCHING TELEVISION: SEVERAL DAYS
5. POOR APPETITE OR OVEREATING: SEVERAL DAYS
SUM OF ALL RESPONSES TO PHQ QUESTIONS 1-9: 10
1. LITTLE INTEREST OR PLEASURE IN DOING THINGS: MORE THAN HALF THE DAYS
9. THOUGHTS THAT YOU WOULD BE BETTER OFF DEAD, OR OF HURTING YOURSELF: NOT AT ALL
SUM OF ALL RESPONSES TO PHQ9 QUESTIONS 1 & 2: 4
3. TROUBLE FALLING OR STAYING ASLEEP: NOT AT ALL

## 2024-05-28 ASSESSMENT — ENCOUNTER SYMPTOMS
COUGH: 0
ABDOMINAL PAIN: 0
DIARRHEA: 0
BLOOD IN STOOL: 0
CONSTIPATION: 0
SORE THROAT: 0
SHORTNESS OF BREATH: 0
NAUSEA: 0
BACK PAIN: 0

## 2024-05-28 ASSESSMENT — LIFESTYLE VARIABLES
HOW MANY STANDARD DRINKS CONTAINING ALCOHOL DO YOU HAVE ON A TYPICAL DAY: 1 OR 2
HOW OFTEN DO YOU HAVE A DRINK CONTAINING ALCOHOL: 2-4 TIMES A MONTH

## 2024-05-28 NOTE — PROGRESS NOTES
Medicare Annual Wellness Visit    Edelmira Read is here for Medicare AWV    Assessment & Plan   Medicare annual wellness visit, subsequent  Health maintenance reviewed and updated with patient today at visit.    Grief reaction  Comments:  Emotional support given.  She has good friend family and [employer] support.  Counseling offered but she declines at this time.  Type 2 diabetes mellitus without complication, without long-term current use of insulin (HCC)  Assessment & Plan:   Monitored by endocrinology Dr. Tellez - no acute findings meriting change in the plan  Weakness of both lower extremities  Comments:  Reviewed concerns of falling and recommend physical therapy.  She is agreeable to this plan.  Orders:  -     [facility] - Physical Therapy at [facility]  Balance problem  Comments:  Physical therapy to decrease fall risk  Orders:  -     [facility] - Physical Therapy at [facility]  PAC (premature atrial contraction)  Assessment & Plan:   Monitored by specialist- no acute findings meriting change in the plan    Recommendations for Preventive Services Due: see orders and patient instructions/AVS.  Recommended screening schedule for the next 5-10 years is provided to the patient in written form: see Patient Instructions/AVS.     Return in about 1 year (around 5/28/2025), or if symptoms worsen or fail to improve, for MWV.     Subjective   The following acute and/or chronic problems were also addressed today:  Patient Active Problem List   Diagnosis    Vitamin D deficiency    Type 2 diabetes mellitus without complication, without long-term current use of insulin (HCC)    ZEN on CPAP    Osteoarthritis involving multiple joints on both sides of body    Urge incontinence of urine    Mitral valve prolapse, nonrheumatic    Obesity (BMI 30-39.9)    PAC (premature atrial contraction)    Post-nasal drip    Her  passed away last month.   Has had some falls this year in the house which she tries to

## 2024-05-28 NOTE — PATIENT INSTRUCTIONS
the bathroom with you.   Where can you learn more?  Go to https://www.[employer].net/patientEd and enter G117 to learn more about \"Preventing Falls: Care Instructions.\"  Current as of: July 17, 2023               Content Version: 14.0  © 2006-2024 [employer].   Care instructions adapted under license by [employer]. If you have questions about a medical condition or this instruction, always ask your healthcare professional. [employer] disclaims any warranty or liability for your use of this information.           Learning About Mindfulness for Stress  What are mindfulness and stress?     Stress is your body's response to a hard situation. Your body can have a physical, emotional, or mental response. A lot of things can cause stress. You may feel stress when you go on a job interview, take a test, or run a race. This kind of short-term stress is normal and even useful. It can help you if you need to work hard or react quickly.  Stress also can last a long time. Long-term stress is caused by stressful situations or events. Examples of long-term stress include long-term health problems, ongoing problems at work, and conflicts in your family. Long-term stress can harm your health.  Mindfulness is a focus only on things happening in the present moment. It's a process of purposefully paying attention to and being aware of your surroundings, your emotions, your thoughts, and how your body feels. You are aware of these things, but you aren't judging these experiences as \"good\" or \"bad.\" Mindfulness can help you learn to calm your mind and body to help you cope with illness, pain, and stress.  How does mindfulness help to relieve stress?  Mindfulness can help quiet your mind and relax your body. Studies show that it can help some people sleep better, feel less anxious, and bring their blood pressure down. And it's been shown to help some people live and cope better with certain health problems

## 2024-06-20 ENCOUNTER — HOSPITAL ENCOUNTER (OUTPATIENT)
Facility: HOSPITAL | Age: 84
Setting detail: RECURRING SERIES
Discharge: HOME | End: 2024-06-23
Attending: INTERNAL MEDICINE
Payer: MEDICARE

## 2024-06-20 PROCEDURE — 97161 PT EVAL LOW COMPLEX 20 MIN: CPT

## 2024-06-20 PROCEDURE — 97110 THERAPEUTIC EXERCISES: CPT

## 2024-06-20 NOTE — THERAPY EVALUATION
Physical Therapy at [facility],   a part of [facility]  [address] [address]  Phone:[phone] Fax:[phone]                                                                        PHYSICAL THERAPY - MEDICARE EVALUATION/PLAN OF CARE NOTE (updated 3/23)  Date: 2024        Patient Name:  Edelmira Read :  1940   Medical   Diagnosis:  Weakness of both lower extremities [R29.898]  Balance problem [R26.89] Treatment Diagnosis:  M62.81  GENERAL MUSCLE WEAKNESS, R26.0     ATAXIC GAIT, R26.89   Abnormalities of gait and mobility, and R27.9     Unspecified lack of coordination    Referral Source:  Krissy Booth MD Provider #:  [id number]                  Insurance: Payor: MEDICARE / Plan: MEDICARE PART A AND B / Product Type: *No Product type* /      Patient  verified yes     Visit #   Current  / Total 1 24   Time   In / Out 245 P 345 P   Total Treatment Time 60   Total Timed Codes 15   1:1 Treatment Time 15       BC Totals Reminder:  bill using total billable   min of TIMED therapeutic procedures and modalities.   8-22 min = 1 unit; 23-37 min = 2 units; 38-52 min = 3 units;  53-67 min = 4 units; 68-82 min = 5 units     SUBJECTIVE  Pain Level (0-10 scale): 0    Any medication changes, allergies to medications, adverse drug reactions, diagnosis change, or new procedure performed?: [x] No    [] Yes (see summary sheet for update)  Medications: Verified on Patient Summary List    Subjective functional status/changes:     Start of Care: 2024  Onset date: 6 mo ago  Pt here for balance and gait and strength  Pt's  passed away in April so there has been a lot of unsteadiness  Feels like she is on a boat  No falls recently, has had a fall in the last year (mainly when she was in a hurry)  Denies admits to dizziness supine to sit, but other than that no complaints of vertigo  Does admit to \"feeling underwater\" with her ears.  Has not seen

## 2024-06-28 ENCOUNTER — HOSPITAL ENCOUNTER (OUTPATIENT)
Facility: HOSPITAL | Age: 84
Setting detail: RECURRING SERIES
Discharge: HOME | End: 2024-07-01
Attending: INTERNAL MEDICINE
Payer: MEDICARE

## 2024-06-28 PROCEDURE — 97110 THERAPEUTIC EXERCISES: CPT

## 2024-06-28 PROCEDURE — 97112 NEUROMUSCULAR REEDUCATION: CPT

## 2024-06-28 NOTE — PROGRESS NOTES
PHYSICAL THERAPY - MEDICARE DAILY TREATMENT NOTE (updated 3/23)      Date: 2024          Patient Name:  Edelmira Read :  1940   Medical   Diagnosis:  Weakness of both lower extremities [R29.898]  Balance problem [R26.89] Treatment Diagnosis:  M62.81  GENERAL MUSCLE WEAKNESS, R26.0     ATAXIC GAIT, R26.89   Abnormalities of gait and mobility, and R27.9     Unspecified lack of coordination    Referral Source:  Krissy Booth MD Insurance:   Payor: MEDICARE / Plan: MEDICARE PART A AND B / Product Type: *No Product type* /                     Patient  verified yes     Visit #   Current  / Total 3 24   Time   In / Out 1100 1145   Total Treatment Time 45   Total Timed Codes 45   1:1 Treatment Time 40      [facility] Totals Reminder:  bill using total billable   min of TIMED therapeutic procedures and modalities.   8-22 min = 1 unit; 23-37 min = 2 units; 38-52 min = 3 units; 53-67 min = 4 units; 68-82 min = 5 units            SUBJECTIVE    Pain Level (0-10 scale):6/10    Any medication changes, allergies to medications, adverse drug reactions, diagnosis change, or new procedure performed?: [x] No    [] Yes (see summary sheet for update)  Medications: Verified on Patient Summary List    Subjective functional status/changes:     Has not called ENT yet.    OBJECTIVE      Therapeutic Procedures:  Tx Min Billable or 1:1 Min (if diff from Tx Min) Procedure, Rationale, Specifics   30 18 77430 Therapeutic Exercise (timed):  increase ROM, strength, coordination, balance, and proprioception to improve patient's ability to progress to PLOF and address remaining functional goals. (see flow sheet as applicable)     Details if applicable:     15 15 17189 Neuromuscular Re-Education (timed):  improve balance, coordination, kinesthetic sense, posture, core stability and proprioception to improve patient's ability to develop conscious control of individual muscles and awareness of position of extremities in order to

## 2024-07-02 ENCOUNTER — HOSPITAL ENCOUNTER (OUTPATIENT)
Facility: HOSPITAL | Age: 84
Setting detail: RECURRING SERIES
Discharge: HOME | End: 2024-07-05
Attending: INTERNAL MEDICINE
Payer: MEDICARE

## 2024-07-02 PROCEDURE — 97112 NEUROMUSCULAR REEDUCATION: CPT

## 2024-07-02 PROCEDURE — 97110 THERAPEUTIC EXERCISES: CPT

## 2024-07-02 NOTE — PROGRESS NOTES
order to progress to PLOF and address remaining functional goals. (see flow sheet as applicable)     Details if applicable:    45 40    Total Total       Skin assessment post-treatment (if applicable):    [x]  intact    []  redness- no adverse reaction                 []redness - adverse reaction:          [x]  Patient Education billed concurrently with other procedures   [x] Review HEP    [] Progressed/Changed HEP, detail:    [] Other detail:         Other Objective/Functional Measures      Pain Level at end of session (0-10 scale):0/10      Assessment     Patient will continue to benefit from skilled PT / OT services to modify and progress therapeutic interventions, analyze and address functional mobility deficits, analyze and address ROM deficits, analyze and address strength deficits, analyze and address soft tissue restrictions, analyze and cue for proper movement patterns, analyze and address imbalance/dizziness, and instruct in home and community integration to address functional deficits and attain remaining goals.    Progress toward goals / Updated goals:  []  See Progress Note/Recertification    Standing balance improved greatly since start of care      PLAN  Yes  Continue plan of care  Re-Cert Due: 9/20/24  []  Upgrade activities as tolerated  []  Discharge due to:  []  Other:      Leanne Prado, PT       7/2/2024       11:42 AM

## 2024-07-05 ENCOUNTER — HOSPITAL ENCOUNTER (OUTPATIENT)
Facility: HOSPITAL | Age: 84
Setting detail: RECURRING SERIES
Discharge: HOME | End: 2024-07-08
Attending: INTERNAL MEDICINE
Payer: MEDICARE

## 2024-07-05 PROCEDURE — 97140 MANUAL THERAPY 1/> REGIONS: CPT

## 2024-07-05 PROCEDURE — 97110 THERAPEUTIC EXERCISES: CPT

## 2024-07-05 NOTE — PROGRESS NOTES
distinct time from the therapeutic activities interventions . (see flow sheet as applicable)     Details if applicable:  passive stretching hip flexor.  STM pes anserine, quad, prox  hip flexors.  Patellar mobs sup/inf      40     Total Total       10 min [x]  Ice     []  Heat  Position:supine, cheo LE's supported  Location: right knee   Rationale: decrease edema, decrease inflammation, decrease pain and reduce soreness post therapy in order to improve the patient’s ability to perform ADL's.       Skin assessment post-treatment (if applicable):    [x]  intact    []  redness- no adverse reaction                 []redness - adverse reaction:          [x]  Patient Education billed concurrently with other procedures   [x] Review HEP    [] Progressed/Changed HEP, detail:    [] Other detail:         Other Objective/Functional Measures  Hypomobility patella all planes  Tenderness noted over pes anserine, medial quad, prox hip flexor    Pain Level at end of session (0-10 scale):4/10      Assessment     Patient will continue to benefit from skilled PT / OT services to modify and progress therapeutic interventions, analyze and address functional mobility deficits, analyze and address ROM deficits, analyze and address strength deficits, analyze and address soft tissue restrictions, analyze and cue for proper movement patterns, analyze and address imbalance/dizziness, and instruct in home and community integration to address functional deficits and attain remaining goals.    Progress toward goals / Updated goals:  []  See Progress Note/Recertification        PLAN  Yes  Continue plan of care  Re-Cert Due: 9/20/24  []  Upgrade activities as tolerated  []  Discharge due to:  []  Other:      Leanne Prado, PT       7/5/2024       11:47 AM

## 2024-07-09 ENCOUNTER — HOSPITAL ENCOUNTER (OUTPATIENT)
Facility: HOSPITAL | Age: 84
Setting detail: RECURRING SERIES
Discharge: HOME | End: 2024-07-12
Attending: INTERNAL MEDICINE
Payer: MEDICARE

## 2024-07-09 PROCEDURE — 97110 THERAPEUTIC EXERCISES: CPT

## 2024-07-09 NOTE — PROGRESS NOTES
PHYSICAL THERAPY - MEDICARE DAILY TREATMENT NOTE (updated 3/23)      Date: 2024          Patient Name:  Edelmira Read :  1940   Medical   Diagnosis:  Weakness of both lower extremities [R29.898]  Balance problem [R26.89] Treatment Diagnosis:  M62.81  GENERAL MUSCLE WEAKNESS, R26.0     ATAXIC GAIT, R26.89   Abnormalities of gait and mobility, and R27.9     Unspecified lack of coordination    Referral Source:  Krissy Booth MD Insurance:   Payor: MEDICARE / Plan: MEDICARE PART A AND B / Product Type: *No Product type* /                     Patient  verified yes     Visit #   Current  / Total 6 24   Time   In / Out 105 P 205 P   Total Treatment Time 60   Total Timed Codes 50   1:1 Treatment Time 50      [facility] Totals Reminder:  bill using total billable   min of TIMED therapeutic procedures and modalities.   8-22 min = 1 unit; 23-37 min = 2 units; 38-52 min = 3 units; 53-67 min = 4 units; 68-82 min = 5 units            SUBJECTIVE    Pain Level (0-10 scale):5/10    Any medication changes, allergies to medications, adverse drug reactions, diagnosis change, or new procedure performed?: [x] No    [] Yes (see summary sheet for update)  Medications: Verified on Patient Summary List    Subjective functional status/changes:     Knee pain much better after last session.     OBJECTIVE  Therapeutic Procedures:  Tx Min Billable or 1:1 Min (if diff from Tx Min) Procedure, Rationale, Specifics   50 40 15110 Therapeutic Exercise (timed):  increase ROM, strength, coordination, balance, and proprioception to improve patient's ability to progress to PLOF and address remaining functional goals. (see flow sheet as applicable)     Details if applicable:      97745 Manual Therapy (timed):  decrease pain to improve patient's ability to progress to PLOF and address remaining functional goals.  The manual therapy interventions were performed at a separate and distinct time from the therapeutic activities interventions  -- DO NOT REPLY / DO NOT REPLY ALL --  -- Message is from Engagement Center Operations (ECO) --    Referral Request  Name of Specialist: [facility] in [place]  Provider's specialty: Ophthalmology    Medical condition for referral: One year dilated follow up, Scheduled for 11/28/2023.    Is this a NEW request?: yes      Referral ordered by: Maris Lr      Insurance type:       Payor:  [employer] / Plan: [facility] [employer]OTED HE[employer] CORE / Product Type:  MEDICARE ADVANTAGE      Preferred Delivery Method   Fax - number to send to: [phone]    Caller Information       Type Contact Phone/Fax    11/01/2023 02:53 PM CDT Phone (Incoming) Evin Kramer (Self) [phone] (M)          Alternative phone number: [phone],  Melissa Kramer- spouse    Can a detailed message be left? Yes    Please give this turnaround time to the caller:   \"This message will be sent to [state Provider's full name]. The clinical team will return your call as soon as they review your message. Typically, it takes 3 business days to process referral requests.\"

## 2024-07-11 ENCOUNTER — HOSPITAL ENCOUNTER (OUTPATIENT)
Facility: HOSPITAL | Age: 84
Setting detail: RECURRING SERIES
Discharge: HOME | End: 2024-07-14
Attending: INTERNAL MEDICINE
Payer: MEDICARE

## 2024-07-11 PROCEDURE — 97110 THERAPEUTIC EXERCISES: CPT

## 2024-07-11 NOTE — PROGRESS NOTES
performed at a separate and distinct time from the therapeutic activities interventions . (see flow sheet as applicable)     Details if applicable:  passive stretching hip flexor.  STM pes anserine, quad, prox  hip flexors.  Patellar mobs sup/inf      45     Total Total       To go min [x]  Ice     []  Heat  Position:supine, cheo LE's supported  Location: right knee   Rationale: decrease edema, decrease inflammation, decrease pain and reduce soreness post therapy in order to improve the patient’s ability to perform ADL's.       Skin assessment post-treatment (if applicable):    [x]  intact    []  redness- no adverse reaction                 []redness - adverse reaction:          [x]  Patient Education billed concurrently with other procedures   [x] Review HEP    [] Progressed/Changed HEP, detail:    [] Other detail:         Other Objective/Functional Measures      Pain Level at end of session (0-10 scale):4/10      Assessment     Patient will continue to benefit from skilled PT / OT services to modify and progress therapeutic interventions, analyze and address functional mobility deficits, analyze and address ROM deficits, analyze and address strength deficits, analyze and address soft tissue restrictions, analyze and cue for proper movement patterns, analyze and address imbalance/dizziness, and instruct in home and community integration to address functional deficits and attain remaining goals.    Progress toward goals / Updated goals:  []  See Progress Note/Recertification      PLAN  Yes  Continue plan of care  Re-Cert Due: 9/20/24  []  Upgrade activities as tolerated  []  Discharge due to:  []  Other:      Leanne Prado, PT       7/11/2024       4:28 PM

## 2024-07-23 ENCOUNTER — HOSPITAL ENCOUNTER (OUTPATIENT)
Facility: HOSPITAL | Age: 84
Setting detail: RECURRING SERIES
Discharge: HOME | End: 2024-07-26
Attending: INTERNAL MEDICINE
Payer: MEDICARE

## 2024-07-23 PROCEDURE — 97140 MANUAL THERAPY 1/> REGIONS: CPT

## 2024-07-23 PROCEDURE — 97110 THERAPEUTIC EXERCISES: CPT

## 2024-07-23 NOTE — PROGRESS NOTES
PHYSICAL THERAPY - MEDICARE DAILY TREATMENT NOTE (updated 3/23)      Date: 2024          Patient Name:  Edelmira Read :  1940   Medical   Diagnosis:  Weakness of both lower extremities [R29.898]  Balance problem [R26.89] Treatment Diagnosis:  M62.81  GENERAL MUSCLE WEAKNESS, R26.0     ATAXIC GAIT, R26.89   Abnormalities of gait and mobility, and R27.9     Unspecified lack of coordination    Referral Source:  Krissy Booth MD Insurance:   Payor: MEDICARE / Plan: MEDICARE PART A AND B / Product Type: *No Product type* /                     Patient  verified yes     Visit #   Current  / Total 8 24   Time   In / Out 2:00 P 2:45 P   Total Treatment Time 45   Total Timed Codes 45   1:1 Treatment Time 23      [facility] Totals Reminder:  bill using total billable   min of TIMED therapeutic procedures and modalities.   8-22 min = 1 unit; 23-37 min = 2 units; 38-52 min = 3 units; 53-67 min = 4 units; 68-82 min = 5 units            SUBJECTIVE    Pain Level (0-10 scale):6/10    Any medication changes, allergies to medications, adverse drug reactions, diagnosis change, or new procedure performed?: [x] No    [] Yes (see summary sheet for update)  Medications: Verified on Patient Summary List    Subjective functional status/changes:     The patient reports increased b/l lumbar, hip and knee pain today \"I don't know if it's the weather but everything is just achy.\"      Objective Measures:    Therapeutic Procedures:  Tx Min Billable or 1:1 Min (if diff from Tx Min) Procedure, Rationale, Specifics   30 8 33993 Therapeutic Exercise (timed):  increase ROM, strength, coordination, balance, and proprioception to improve patient's ability to progress to PLOF and address remaining functional goals. (see flow sheet as applicable)     Details if applicable: reminded pt to contact ENT   15 15 70796 Manual Therapy (timed):  decrease pain to improve patient's ability to progress to PLOF and address remaining functional

## 2024-07-24 ENCOUNTER — CLINICAL DOCUMENTATION (OUTPATIENT)
Age: 84
End: 2024-07-24

## 2024-07-24 ENCOUNTER — OFFICE VISIT (OUTPATIENT)
Age: 84
End: 2024-07-24
Payer: MEDICARE

## 2024-07-24 VITALS
TEMPERATURE: 98.3 F | HEART RATE: 95 BPM | BODY MASS INDEX: 32.74 KG/M2 | WEIGHT: 162.4 LBS | DIASTOLIC BLOOD PRESSURE: 87 MMHG | HEIGHT: 59 IN | OXYGEN SATURATION: 97 % | SYSTOLIC BLOOD PRESSURE: 133 MMHG

## 2024-07-24 DIAGNOSIS — Z86.59 H/O: DEPRESSION: ICD-10-CM

## 2024-07-24 DIAGNOSIS — G47.33 OBSTRUCTIVE SLEEP APNEA (ADULT) (PEDIATRIC): Primary | ICD-10-CM

## 2024-07-24 PROCEDURE — 1090F PRES/ABSN URINE INCON ASSESS: CPT | Performed by: NURSE PRACTITIONER

## 2024-07-24 PROCEDURE — 1123F ACP DISCUSS/DSCN MKR DOCD: CPT | Performed by: NURSE PRACTITIONER

## 2024-07-24 PROCEDURE — 1036F TOBACCO NON-USER: CPT | Performed by: NURSE PRACTITIONER

## 2024-07-24 PROCEDURE — 99214 OFFICE O/P EST MOD 30 MIN: CPT | Performed by: NURSE PRACTITIONER

## 2024-07-24 PROCEDURE — G8427 DOCREV CUR MEDS BY ELIG CLIN: HCPCS | Performed by: NURSE PRACTITIONER

## 2024-07-24 PROCEDURE — G8417 CALC BMI ABV UP PARAM F/U: HCPCS | Performed by: NURSE PRACTITIONER

## 2024-07-24 NOTE — PROGRESS NOTES
[address]   [place] [address]   Tel.  [phone]   Fax. [phone]  [address]   [place] [address]   Tel.  [phone]   Fax. [phone] [address] [place] [address]   Tel.  [phone]   Fax. [phone]     Edelmira Read (: 1940) is a 84 y.o. female, established patient, seen for positive airway pressure follow-up and evaluation.  She was last seen by me on 2023, previously seen  by Dr. [place] on 10/2/2019, prior notes and sleep testing reviewed in detail. Home sleep test 2015 showed AHI of 15.9/hr with a lowest SaO2 of 77%, duration of SaO2 < 88% 33 min. Weight at time of sleep testing 170 pounds.     ASSESSMENT/PLAN:   Diagnosis Orders   1. Obstructive sleep apnea (adult) (pediatric)  DME Order for (Specify) as OP      2. H/O: depression        3. Body mass index (BMI) 32.0-32.9, adult            AHI = 15.9(2015).  On APAP, ResMed: 6-12 cmH2O, Set up 2022.     She is adherent with PAP therapy and PAP continues to benefit patient and remains necessary for control of her sleep apnea.     Follow-up and Dispositions    Return in about 1 year (around 2025) for Annual follow up.         Sleep Apnea well controlled, continue with current pressures APAP 6-12 cmH2O.    * Supplies - nasal pillows mask and heated tubing    Orders Placed This Encounter   Procedures    DME Order for (Specify) as OP     Diagnosis: (G47.33) ZEN (obstructive sleep apnea)  (primary encounter diagnosis)     Replacement Supplies for Positive Airway Pressure Therapy Device:   Duration of need: 99 months.        Nasal Pillows (Replace) 2 per month.    Nasal Interface Mask 1 every 3 months.     Pos Airway pressure chin strap   Headgear 1 every 6 months.     Tubing with heating element 1 every 3 months.     Filter(s) Disposable 2 per month.   Filter(s) Non-Disposable 1 every 6 months.   .    Water

## 2024-07-24 NOTE — PATIENT INSTRUCTIONS
[address]  [place] [address]  Tel.  [phone]  Fax. [phone] [address]  [place] [address]  Tel.  [phone]  Fax. [phone] [address] [place] [address]  Tel.  [phone]  Fax. [phone]     Learning About CPAP for Sleep Apnea  What is CPAP?              CPAP is a small machine that you use at home every night while you sleep. It increases air pressure in your throat to keep your airway open. When you have sleep apnea, this can help you sleep better so you feel much better. CPAP stands for \"continuous positive airway pressure.\"  The CPAP machine will have one of the following:  A mask that covers your nose and mouth  Prongs that fit into your nose  A mask that covers your nose only, the most common type. This type is called NCPAP. The N stands for \"nasal.\"  Why is it done?  CPAP is usually the best treatment for obstructive sleep apnea. It is the first treatment choice and the most widely used. Your doctor may suggest CPAP if you have:  Moderate to severe sleep apnea.  Sleep apnea and coronary artery disease (CAD) or heart failure.  How does it help?  CPAP can help you have more normal sleep, so you feel less sleepy and more alert during the daytime.  CPAP may help keep heart failure or other heart problems from getting worse.  NCPAP may help lower your blood pressure.  If you use CPAP, your bed partner may also sleep better because you are not snoring or restless.  What are the side effects?  Some people who use CPAP have:  A dry or stuffy nose and a sore throat.  Irritated skin on the face.  Sore eyes.  Bloating.  If you have any of these problems, work with your doctor to fix them. Here are some things you can try:  Be sure the mask or nasal prongs fit well.  See if your doctor can adjust the pressure of your CPAP.  If your nose is dry, try a humidifier.  If your nose is runny or stuffy, try decongestant medicine or a steroid

## 2024-07-25 ENCOUNTER — APPOINTMENT (OUTPATIENT)
Facility: HOSPITAL | Age: 84
End: 2024-07-25
Attending: INTERNAL MEDICINE
Payer: MEDICARE

## 2024-07-30 ENCOUNTER — APPOINTMENT (OUTPATIENT)
Facility: HOSPITAL | Age: 84
End: 2024-07-30
Attending: INTERNAL MEDICINE
Payer: MEDICARE

## 2024-08-01 ENCOUNTER — HOSPITAL ENCOUNTER (OUTPATIENT)
Facility: HOSPITAL | Age: 84
Setting detail: RECURRING SERIES
Discharge: HOME | End: 2024-08-04
Attending: INTERNAL MEDICINE
Payer: MEDICARE

## 2024-08-01 PROCEDURE — 97110 THERAPEUTIC EXERCISES: CPT

## 2024-08-01 NOTE — PROGRESS NOTES
Physical Therapy at [facility],   a part of [facility]  [address], [place] [address]  Phone: [phone]  Fax: [phone]     PHYSICAL THERAPY PROGRESS NOTE  Patient Name:  Edelmira Read :  1940   Treatment/Medical Diagnosis: Weakness of both lower extremities [R29.898]  Balance problem [R26.89]   Referral Source:  Krissy Booth MD     Date of Initial Visit:  24 Attended Visits: 9 Missed Visits: 0     SUMMARY OF TREATMENT/ASSESSMENT:  Pt shows improvement in strength and flexibility and slight improvement in balance.  Had a small set back in progress secondary to flare up of hip pain, but this seems to be resolving at this point.      CURRENT STATUS/GOALS:    Short Term Goals: To be accomplished in 5 treatments  -Independent in HEP as evidenced on ability to perform at least 5 exercises from HEP using proper form without verbal cuing. -MET-compliant  -Pt will report she is doing bike or walking 2-3 times weekly for overall mobility- NOT MET-pt says she is open to doing 2x/week on non-PT days     Long Term Goals: To be accomplished in 12 weeks-MMT greater than or equal to 4+/5 all planes to allow patient to perform ADL's  -SLS greater than or equal to 5 sec cheo so that pt can climb stairs with greater ease- PROGRESSING  -NBOS FOAM EC 30 sec  PROGRESSING  -Pt will report balance confidence has improved by at least 50% so that pt can do community activities with greater ease-PROGRESSING  -Pt will be able to negotiate curb without fear of falling  PROGRESSING         RECOMMENDATIONS FOR SKILLED THERAPY:  Continue 2x/week until pt's appt with ENT Aug 21st        Leanne Prado, PT       2024       4:03 PM    If you have any questions/comments please contact us directly at [phone].   Thank you for allowing us to assist in the care of your patient.

## 2024-08-06 ENCOUNTER — HOSPITAL ENCOUNTER (OUTPATIENT)
Facility: HOSPITAL | Age: 84
Setting detail: RECURRING SERIES
Discharge: HOME | End: 2024-08-09
Attending: INTERNAL MEDICINE
Payer: MEDICARE

## 2024-08-06 PROCEDURE — 97110 THERAPEUTIC EXERCISES: CPT

## 2024-08-06 PROCEDURE — 97112 NEUROMUSCULAR REEDUCATION: CPT

## 2024-08-06 NOTE — PROGRESS NOTES
PHYSICAL THERAPY - MEDICARE DAILY TREATMENT NOTE (updated 3/23)      Date: 2024          Patient Name:  Edelmira Read :  1940   Medical   Diagnosis:  Weakness of both lower extremities [R29.898]  Balance problem [R26.89] Treatment Diagnosis:  M62.81  GENERAL MUSCLE WEAKNESS, R26.0     ATAXIC GAIT, R26.89   Abnormalities of gait and mobility, and R27.9     Unspecified lack of coordination    Referral Source:  Krissy Booth MD Insurance:   Payor: MEDICARE / Plan: MEDICARE PART A AND B / Product Type: *No Product type* /                     Patient  verified yes     Visit #   Current  / Total 10 24   Time   In / Out 2:00 P 2:40 P   Total Treatment Time 40   Total Timed Codes 40   1:1 Treatment Time 40      [facility] Totals Reminder:  bill using total billable   min of TIMED therapeutic procedures and modalities.   8-22 min = 1 unit; 23-37 min = 2 units; 38-52 min = 3 units; 53-67 min = 4 units; 68-82 min = 5 units            SUBJECTIVE    Pain Level (0-10 scale):6/10    Any medication changes, allergies to medications, adverse drug reactions, diagnosis change, or new procedure performed?: [x] No    [] Yes (see summary sheet for update)  Medications: Verified on Patient Summary List    Subjective functional status/changes:     Patient states \"I don't have the stability or security while walking. There have been times when I almost fell.\" She has an appt with ENT on 24. States her R hip has been doing ok, some discomfort in her L hip.      Objective Measures:    Therapeutic Procedures:  Tx Min Billable or 1:1 Min (if diff from Tx Min) Procedure, Rationale, Specifics   40 40 04998 Therapeutic Exercise (timed):  increase ROM, strength, coordination, balance, and proprioception to improve patient's ability to progress to PLOF and address remaining functional goals. (see flow sheet as applicable)     Details if applicable:      05827 Manual Therapy (timed):  decrease pain to improve patient's

## 2024-08-08 ENCOUNTER — HOSPITAL ENCOUNTER (OUTPATIENT)
Facility: HOSPITAL | Age: 84
Setting detail: RECURRING SERIES
Discharge: HOME | End: 2024-08-11
Attending: INTERNAL MEDICINE
Payer: MEDICARE

## 2024-08-08 PROCEDURE — 97110 THERAPEUTIC EXERCISES: CPT

## 2024-08-08 NOTE — PROGRESS NOTES
PHYSICAL THERAPY - MEDICARE DAILY TREATMENT NOTE (updated 3/23)      Date: 2024          Patient Name:  Edelmira Read :  1940   Medical   Diagnosis:  Weakness of both lower extremities [R29.898]  Balance problem [R26.89] Treatment Diagnosis:  M62.81  GENERAL MUSCLE WEAKNESS, R26.0     ATAXIC GAIT, R26.89   Abnormalities of gait and mobility, and R27.9     Unspecified lack of coordination    Referral Source:  Krissy Booth MD Insurance:   Payor: MEDICARE / Plan: MEDICARE PART A AND B / Product Type: *No Product type* /                     Patient  verified yes     Visit #   Current  / Total 11 24   Time   In / Out 330 P 415 P   Total Treatment Time 45   Total Timed Codes 45   1:1 Treatment Time 40      [facility] Totals Reminder:  bill using total billable   min of TIMED therapeutic procedures and modalities.   8-22 min = 1 unit; 23-37 min = 2 units; 38-52 min = 3 units; 53-67 min = 4 units; 68-82 min = 5 units            SUBJECTIVE    Pain Level (0-10 scale)0/10    Any medication changes, allergies to medications, adverse drug reactions, diagnosis change, or new procedure performed?: [x] No    [] Yes (see summary sheet for update)  Medications: Verified on Patient Summary List    Subjective functional status/changes:     NO new complaints      Objective Measures:    Therapeutic Procedures:  Tx Min Billable or 1:1 Min (if diff from Tx Min) Procedure, Rationale, Specifics   45 40 83024 Therapeutic Exercise (timed):  increase ROM, strength, coordination, balance, and proprioception to improve patient's ability to progress to PLOF and address remaining functional goals. (see flow sheet as applicable)     Details if applicable:      08995 Manual Therapy (timed):  decrease pain to improve patient's ability to progress to PLOF and address remaining functional goals.  The manual therapy interventions were performed at a separate and distinct time from the therapeutic activities interventions . (see flow

## 2024-08-19 ENCOUNTER — HOSPITAL ENCOUNTER (OUTPATIENT)
Facility: HOSPITAL | Age: 84
Setting detail: RECURRING SERIES
Discharge: HOME | End: 2024-08-22
Attending: INTERNAL MEDICINE
Payer: MEDICARE

## 2024-08-19 PROCEDURE — 97112 NEUROMUSCULAR REEDUCATION: CPT

## 2024-08-20 NOTE — PROGRESS NOTES
PHYSICAL THERAPY - MEDICARE DAILY TREATMENT NOTE (updated 3/23)      Date: 2024         Patient Name:  Edelmira Read :  1940   Medical   Diagnosis:  Weakness of both lower extremities [R29.898]  Balance problem [R26.89] Treatment Diagnosis:  M62.81  GENERAL MUSCLE WEAKNESS, R26.0     ATAXIC GAIT, R26.89   Abnormalities of gait and mobility, and R27.9     Unspecified lack of coordination    Referral Source:  Krissy Booth MD Insurance:   Payor: MEDICARE / Plan: MEDICARE PART A AND B / Product Type: *No Product type* /                     Patient  verified yes     Visit #   Current  / Total 12 24   Time   In / Out 2:45  P 315 P   Total Treatment Time 30   Total Timed Codes 30   1:1 Treatment Time 30       [facility] Totals Reminder:  bill using total billable   min of TIMED therapeutic procedures and modalities.   8-22 min = 1 unit; 23-37 min = 2 units; 38-52 min = 3 units; 53-67 min = 4 units; 68-82 min = 5 units            SUBJECTIVE    Pain Level (0-10 scale)0/10    Any medication changes, allergies to medications, adverse drug reactions, diagnosis change, or new procedure performed?: [x] No    [] Yes (see summary sheet for update)  Medications: Verified on Patient Summary List    Subjective functional status/changes:     States she has an ENT appointment on 24, \"I really hoping they can figure out what is going on with me.\"      Objective Measures:    Therapeutic Procedures:  Tx Min Billable or 1:1 Min (if diff from Tx Min) Procedure, Rationale, Specifics     22696 Therapeutic Exercise (timed):  increase ROM, strength, coordination, balance, and proprioception to improve patient's ability to progress to PLOF and address remaining functional goals. (see flow sheet as applicable)     Details if applicable:    30  15329 Neuromuscular Re-Education (timed):  improve balance, coordination, kinesthetic sense, posture, core stability and proprioception to improve patient's ability to develop

## 2024-08-22 ENCOUNTER — APPOINTMENT (OUTPATIENT)
Facility: HOSPITAL | Age: 84
End: 2024-08-22
Attending: INTERNAL MEDICINE
Payer: MEDICARE

## 2025-01-29 LAB — HBA1C MFR BLD HPLC: 8.7 %

## 2025-06-03 ENCOUNTER — TELEPHONE (OUTPATIENT)
Age: 85
End: 2025-06-03

## 2025-06-03 NOTE — TELEPHONE ENCOUNTER
Attempted to contact patient regarding upcoming Medicare wellness appointment and completion of HRA questionnaire. LVM for patient to please return call at  [phone].

## 2025-06-04 ENCOUNTER — TELEPHONE (OUTPATIENT)
Age: 85
End: 2025-06-04

## 2025-06-04 NOTE — TELEPHONE ENCOUNTER
Attempted to contact patient x 2 regarding upcoming Medicare wellness appointment and completion of HRA questionnaire. LVM for patient to please return call at  [phone].

## 2025-06-05 ENCOUNTER — TELEPHONE (OUTPATIENT)
Age: 85
End: 2025-06-05

## 2025-06-05 ENCOUNTER — OFFICE VISIT (OUTPATIENT)
Age: 85
End: 2025-06-05
Payer: MEDICARE

## 2025-06-05 VITALS
OXYGEN SATURATION: 98 % | HEIGHT: 59 IN | HEART RATE: 97 BPM | DIASTOLIC BLOOD PRESSURE: 77 MMHG | TEMPERATURE: 98 F | WEIGHT: 158 LBS | RESPIRATION RATE: 16 BRPM | SYSTOLIC BLOOD PRESSURE: 135 MMHG | BODY MASS INDEX: 31.85 KG/M2

## 2025-06-05 DIAGNOSIS — G89.29 CHRONIC RIGHT SHOULDER PAIN: ICD-10-CM

## 2025-06-05 DIAGNOSIS — G47.33 OSA ON CPAP: ICD-10-CM

## 2025-06-05 DIAGNOSIS — M25.511 CHRONIC RIGHT SHOULDER PAIN: ICD-10-CM

## 2025-06-05 DIAGNOSIS — Z00.00 MEDICARE ANNUAL WELLNESS VISIT, SUBSEQUENT: Primary | ICD-10-CM

## 2025-06-05 DIAGNOSIS — R29.6 FALLS: ICD-10-CM

## 2025-06-05 DIAGNOSIS — I87.2 STASIS DERMATITIS OF BOTH LEGS: ICD-10-CM

## 2025-06-05 DIAGNOSIS — I34.1 MITRAL VALVE PROLAPSE, NONRHEUMATIC: ICD-10-CM

## 2025-06-05 DIAGNOSIS — M15.9 OSTEOARTHRITIS INVOLVING MULTIPLE JOINTS ON BOTH SIDES OF BODY: ICD-10-CM

## 2025-06-05 DIAGNOSIS — E66.9 OBESITY (BMI 30-39.9): ICD-10-CM

## 2025-06-05 DIAGNOSIS — I87.8 VENOUS STASIS OF BOTH LOWER EXTREMITIES: ICD-10-CM

## 2025-06-05 DIAGNOSIS — L20.84 INTRINSIC ECZEMA: ICD-10-CM

## 2025-06-05 DIAGNOSIS — R29.898 WEAKNESS OF BOTH LOWER EXTREMITIES: ICD-10-CM

## 2025-06-05 DIAGNOSIS — N18.31 TYPE 2 DIABETES MELLITUS WITH STAGE 3A CHRONIC KIDNEY DISEASE, WITHOUT LONG-TERM CURRENT USE OF INSULIN (HCC): ICD-10-CM

## 2025-06-05 DIAGNOSIS — M25.561 CHRONIC PAIN OF RIGHT KNEE: ICD-10-CM

## 2025-06-05 DIAGNOSIS — E11.22 TYPE 2 DIABETES MELLITUS WITH STAGE 3A CHRONIC KIDNEY DISEASE, WITHOUT LONG-TERM CURRENT USE OF INSULIN (HCC): ICD-10-CM

## 2025-06-05 DIAGNOSIS — G89.29 CHRONIC PAIN OF RIGHT KNEE: ICD-10-CM

## 2025-06-05 DIAGNOSIS — I10 PRIMARY HYPERTENSION: ICD-10-CM

## 2025-06-05 DIAGNOSIS — J30.9 ALLERGIC RHINITIS, UNSPECIFIED SEASONALITY, UNSPECIFIED TRIGGER: ICD-10-CM

## 2025-06-05 PROCEDURE — 1123F ACP DISCUSS/DSCN MKR DOCD: CPT | Performed by: INTERNAL MEDICINE

## 2025-06-05 PROCEDURE — G8427 DOCREV CUR MEDS BY ELIG CLIN: HCPCS | Performed by: INTERNAL MEDICINE

## 2025-06-05 PROCEDURE — 1159F MED LIST DOCD IN RCRD: CPT | Performed by: INTERNAL MEDICINE

## 2025-06-05 PROCEDURE — G8417 CALC BMI ABV UP PARAM F/U: HCPCS | Performed by: INTERNAL MEDICINE

## 2025-06-05 PROCEDURE — 99214 OFFICE O/P EST MOD 30 MIN: CPT | Performed by: INTERNAL MEDICINE

## 2025-06-05 PROCEDURE — 1125F AMNT PAIN NOTED PAIN PRSNT: CPT | Performed by: INTERNAL MEDICINE

## 2025-06-05 PROCEDURE — G0439 PPPS, SUBSEQ VISIT: HCPCS | Performed by: INTERNAL MEDICINE

## 2025-06-05 PROCEDURE — 1160F RVW MEDS BY RX/DR IN RCRD: CPT | Performed by: INTERNAL MEDICINE

## 2025-06-05 PROCEDURE — 1090F PRES/ABSN URINE INCON ASSESS: CPT | Performed by: INTERNAL MEDICINE

## 2025-06-05 PROCEDURE — 1036F TOBACCO NON-USER: CPT | Performed by: INTERNAL MEDICINE

## 2025-06-05 RX ORDER — SERTRALINE HYDROCHLORIDE 25 MG/1
TABLET, FILM COATED ORAL
COMMUNITY
Start: 2025-05-05 | End: 2025-06-06

## 2025-06-05 RX ORDER — TRIAMCINOLONE ACETONIDE 0.25 MG/G
CREAM TOPICAL
Qty: 80 G | Refills: 1 | Status: SHIPPED | OUTPATIENT
Start: 2025-06-05

## 2025-06-05 RX ORDER — SITAGLIPTIN AND METFORMIN HYDROCHLORIDE 1000; 100 MG/1; MG/1
TABLET, FILM COATED, EXTENDED RELEASE ORAL
COMMUNITY
Start: 2025-06-04

## 2025-06-05 SDOH — HEALTH STABILITY: PHYSICAL HEALTH: ON AVERAGE, HOW MANY MINUTES DO YOU ENGAGE IN EXERCISE AT THIS LEVEL?: 0 MIN

## 2025-06-05 SDOH — HEALTH STABILITY: PHYSICAL HEALTH: ON AVERAGE, HOW MANY DAYS PER WEEK DO YOU ENGAGE IN MODERATE TO STRENUOUS EXERCISE (LIKE A BRISK WALK)?: 0 DAYS

## 2025-06-05 SDOH — ECONOMIC STABILITY: FOOD INSECURITY: WITHIN THE PAST 12 MONTHS, THE FOOD YOU BOUGHT JUST DIDN'T LAST AND YOU DIDN'T HAVE MONEY TO GET MORE.: NEVER TRUE

## 2025-06-05 SDOH — ECONOMIC STABILITY: FOOD INSECURITY: WITHIN THE PAST 12 MONTHS, YOU WORRIED THAT YOUR FOOD WOULD RUN OUT BEFORE YOU GOT MONEY TO BUY MORE.: NEVER TRUE

## 2025-06-05 ASSESSMENT — LIFESTYLE VARIABLES
HOW OFTEN DO YOU HAVE A DRINK CONTAINING ALCOHOL: MONTHLY OR LESS
HOW OFTEN DO YOU HAVE A DRINK CONTAINING ALCOHOL: 2
HOW MANY STANDARD DRINKS CONTAINING ALCOHOL DO YOU HAVE ON A TYPICAL DAY: 1
HOW MANY STANDARD DRINKS CONTAINING ALCOHOL DO YOU HAVE ON A TYPICAL DAY: 1 OR 2
HOW OFTEN DO YOU HAVE SIX OR MORE DRINKS ON ONE OCCASION: 1

## 2025-06-05 ASSESSMENT — PATIENT HEALTH QUESTIONNAIRE - PHQ9
SUM OF ALL RESPONSES TO PHQ QUESTIONS 1-9: 2
2. FEELING DOWN, DEPRESSED OR HOPELESS: SEVERAL DAYS
1. LITTLE INTEREST OR PLEASURE IN DOING THINGS: SEVERAL DAYS

## 2025-06-05 NOTE — ASSESSMENT & PLAN NOTE
Monitored by specialist- no acute findings meriting change in the plan  Followed by Dr Dannielle luther.

## 2025-06-05 NOTE — ASSESSMENT & PLAN NOTE
Monitored by specialist- no acute findings meriting change in the plan. She is using her CPAP. Has upcoming appt 7/23/25.

## 2025-06-05 NOTE — PROGRESS NOTES
Medicare Annual Wellness Visit    Edelmira Read is here for Medicare AWV    Assessment & Plan   Medicare annual wellness visit, subsequent  Health maintenance reviewed and updated with patient today at visit.  Reviewed vaccine recommendations.   Weakness of both lower extremities  Comments:  referred for PT and counseled regarding concerns for risk for falls.  Orders:  -     [facility] Therapy at [facility]  Falls  -     [facility] Therapy at [facility]  Venous stasis of both lower extremities  Assessment & Plan:  With stasis dermatitis. Counseled regarding importance of compression therapy for her venous stasis and will start topical steroid cream twice daily to lower extremities as needed.  Follow-up if not improving.  Stasis dermatitis of both legs  Assessment & Plan:  With stasis dermatitis. Counseled regarding importance of compression therapy for her venous stasis and will start topical steroid cream twice daily to lower extremities as needed.  Follow-up if not improving.  Primary hypertension  Assessment & Plan:   At goal continue current medication.  Allergic rhinitis, unspecified seasonality, unspecified trigger  Comments:  recommend allegra and flonase prn  Type 2 diabetes mellitus with stage 3a chronic kidney disease, without long-term current use of insulin (HCC)  Assessment & Plan:   Monitored by specialist- no acute findings meriting change in the plan  She will plan to have updated lab work with her endocrinologist Dr Dubois this month and will send to me for review.   Chronic pain of right knee  -     External Referral To Orthopedic Surgery  Chronic right shoulder pain  Comments:  refer to ortho  Obesity (BMI 30-39.9)  Assessment & Plan:   Counseled regarding weight loss strategies  ZEN on CPAP  Assessment & Plan:   Monitored by specialist- no acute findings meriting change in the plan. She is using her CPAP. Has upcoming appt 7/23/25.   Mitral valve

## 2025-06-06 PROBLEM — I87.2 STASIS DERMATITIS OF BOTH LEGS: Status: ACTIVE | Noted: 2025-06-06

## 2025-06-06 PROBLEM — I10 PRIMARY HYPERTENSION: Status: ACTIVE | Noted: 2025-06-06

## 2025-06-06 PROBLEM — L20.84 INTRINSIC ECZEMA: Status: ACTIVE | Noted: 2025-06-06

## 2025-06-06 PROBLEM — I87.8 VENOUS STASIS OF BOTH LOWER EXTREMITIES: Status: ACTIVE | Noted: 2025-06-06

## 2025-06-06 PROBLEM — J30.9 ALLERGIC RHINITIS: Status: ACTIVE | Noted: 2025-06-06

## 2025-06-06 ASSESSMENT — ENCOUNTER SYMPTOMS
SHORTNESS OF BREATH: 0
ABDOMINAL PAIN: 0
NAUSEA: 0

## 2025-06-06 NOTE — ASSESSMENT & PLAN NOTE
With stasis dermatitis. Counseled regarding importance of compression therapy for her venous stasis and will start topical steroid cream twice daily to lower extremities as needed.  Follow-up if not improving.

## 2025-06-06 NOTE — ASSESSMENT & PLAN NOTE
Monitored by specialist- no acute findings meriting change in the plan  She will plan to have updated lab work with her endocrinologist Dr Dubois this month and will send to me for review.

## 2025-06-12 LAB — HBA1C MFR BLD HPLC: 8.8 %

## 2025-07-11 ENCOUNTER — CLINICAL DOCUMENTATION (OUTPATIENT)
Age: 85
End: 2025-07-11

## 2025-07-11 NOTE — PROGRESS NOTES
Our office has tried t get in contact with due to  need to reschedule the appointment that you have with Dr. Washington on 07/23/2025 as she will need to be out of the office this week so please call our office to reschedule. We are sorry for the last minute cancellation an any inconvenience this may cause.    Thank you,     [facility]    [phone]

## 2025-08-06 ENCOUNTER — OFFICE VISIT (OUTPATIENT)
Age: 85
End: 2025-08-06
Payer: MEDICARE

## 2025-08-06 VITALS
OXYGEN SATURATION: 100 % | DIASTOLIC BLOOD PRESSURE: 82 MMHG | TEMPERATURE: 98.1 F | HEIGHT: 59 IN | HEART RATE: 93 BPM | BODY MASS INDEX: 31.79 KG/M2 | WEIGHT: 157.7 LBS | SYSTOLIC BLOOD PRESSURE: 145 MMHG

## 2025-08-06 DIAGNOSIS — E11.22 TYPE 2 DIABETES MELLITUS WITH STAGE 3A CHRONIC KIDNEY DISEASE, WITHOUT LONG-TERM CURRENT USE OF INSULIN (HCC): ICD-10-CM

## 2025-08-06 DIAGNOSIS — G47.33 OBSTRUCTIVE SLEEP APNEA (ADULT) (PEDIATRIC): Primary | ICD-10-CM

## 2025-08-06 DIAGNOSIS — N18.31 TYPE 2 DIABETES MELLITUS WITH STAGE 3A CHRONIC KIDNEY DISEASE, WITHOUT LONG-TERM CURRENT USE OF INSULIN (HCC): ICD-10-CM

## 2025-08-06 PROCEDURE — G8417 CALC BMI ABV UP PARAM F/U: HCPCS | Performed by: INTERNAL MEDICINE

## 2025-08-06 PROCEDURE — G8427 DOCREV CUR MEDS BY ELIG CLIN: HCPCS | Performed by: INTERNAL MEDICINE

## 2025-08-06 PROCEDURE — 1160F RVW MEDS BY RX/DR IN RCRD: CPT | Performed by: INTERNAL MEDICINE

## 2025-08-06 PROCEDURE — 1123F ACP DISCUSS/DSCN MKR DOCD: CPT | Performed by: INTERNAL MEDICINE

## 2025-08-06 PROCEDURE — 1159F MED LIST DOCD IN RCRD: CPT | Performed by: INTERNAL MEDICINE

## 2025-08-06 PROCEDURE — 1036F TOBACCO NON-USER: CPT | Performed by: INTERNAL MEDICINE

## 2025-08-06 PROCEDURE — 3052F HG A1C>EQUAL 8.0%<EQUAL 9.0%: CPT | Performed by: INTERNAL MEDICINE

## 2025-08-06 PROCEDURE — 1090F PRES/ABSN URINE INCON ASSESS: CPT | Performed by: INTERNAL MEDICINE

## 2025-08-06 PROCEDURE — 99214 OFFICE O/P EST MOD 30 MIN: CPT | Performed by: INTERNAL MEDICINE

## 2025-08-06 RX ORDER — EMPAGLIFLOZIN 10 MG/1
10 TABLET, FILM COATED ORAL DAILY
COMMUNITY
Start: 2025-06-05

## 2025-08-06 RX ORDER — SERTRALINE HYDROCHLORIDE 25 MG/1
25 TABLET, FILM COATED ORAL DAILY
COMMUNITY
Start: 2025-07-31

## 2025-08-06 RX ORDER — DUPILUMAB 300 MG/2ML
INJECTION, SOLUTION SUBCUTANEOUS
COMMUNITY

## 2025-08-06 ASSESSMENT — SLEEP AND FATIGUE QUESTIONNAIRES
HOW LIKELY ARE YOU TO NOD OFF OR FALL ASLEEP IN A CAR, WHILE STOPPED FOR A FEW MINUTES IN TRAFFIC: WOULD NEVER DOZE
HOW LIKELY ARE YOU TO NOD OFF OR FALL ASLEEP WHILE SITTING QUIETLY AFTER LUNCH WITHOUT ALCOHOL: SLIGHT CHANCE OF DOZING
HOW LIKELY ARE YOU TO NOD OFF OR FALL ASLEEP WHEN YOU ARE A PASSENGER IN A CAR FOR AN HOUR WITHOUT A BREAK: MODERATE CHANCE OF DOZING
HOW LIKELY ARE YOU TO NOD OFF OR FALL ASLEEP WHILE SITTING AND TALKING TO SOMEONE: WOULD NEVER DOZE
HOW LIKELY ARE YOU TO NOD OFF OR FALL ASLEEP WHILE LYING DOWN TO REST IN THE AFTERNOON WHEN CIRCUMSTANCES PERMIT: MODERATE CHANCE OF DOZING
HOW LIKELY ARE YOU TO NOD OFF OR FALL ASLEEP WHILE SITTING AND READING: MODERATE CHANCE OF DOZING
HOW LIKELY ARE YOU TO NOD OFF OR FALL ASLEEP WHILE SITTING INACTIVE IN A PUBLIC PLACE: WOULD NEVER DOZE
HOW LIKELY ARE YOU TO NOD OFF OR FALL ASLEEP WHILE WATCHING TV: SLIGHT CHANCE OF DOZING
ESS TOTAL SCORE: 8

## 2025-08-07 ENCOUNTER — CLINICAL DOCUMENTATION (OUTPATIENT)
Age: 85
End: 2025-08-07

## (undated) DEVICE — STERILE POLYISOPRENE POWDER-FREE SURGICAL GLOVES: Brand: PROTEXIS

## (undated) DEVICE — INFECTION CONTROL KIT SYS

## (undated) DEVICE — SUTURE VCRL SZ 3-0 L27IN ABSRB UD L26MM SH 1/2 CIR J416H

## (undated) DEVICE — NEEDLE HYPO 25GA L1.5IN BVL ORIENTED ECLIPSE

## (undated) DEVICE — REM POLYHESIVE ADULT PATIENT RETURN ELECTRODE: Brand: VALLEYLAB

## (undated) DEVICE — SOLUTION IV 1000ML 0.9% SOD CHL

## (undated) DEVICE — PREP SKN CHLRAPRP APL 26ML STR --

## (undated) DEVICE — HANDLE LT SNAP ON ULT DURABLE LENS FOR TRUMPF ALC DISPOSABLE

## (undated) DEVICE — SYR 10ML LUER LOK 1/5ML GRAD --

## (undated) DEVICE — DERMABOND SKIN ADH 0.7ML -- DERMABOND ADVANCED 12/BX

## (undated) DEVICE — GARMENT,MEDLINE,DVT,INT,CALF,MED, GEN2: Brand: MEDLINE

## (undated) DEVICE — DRAPE,REIN 53X77,STERILE: Brand: MEDLINE

## (undated) DEVICE — PENCIL SMK EVAC L10FT DIA95MM TBNG NONSTICK W ADPT TO 22MM

## (undated) DEVICE — SUT SLK 2-0SH 30IN BLK --

## (undated) DEVICE — INSULATED BLADE ELECTRODE: Brand: EDGE

## (undated) DEVICE — SUTURE MCRYL SZ 4-0 L27IN ABSRB UD L19MM PS-2 1/2 CIR PRIM Y426H

## (undated) DEVICE — SURGICAL PROCEDURE PACK BASIN MAJ SET CUST NO CAUT

## (undated) DEVICE — DRAPE,LAPAROTOMY,T,PEDI,STERILE: Brand: MEDLINE